# Patient Record
Sex: FEMALE | Race: WHITE | NOT HISPANIC OR LATINO | Employment: UNEMPLOYED | URBAN - METROPOLITAN AREA
[De-identification: names, ages, dates, MRNs, and addresses within clinical notes are randomized per-mention and may not be internally consistent; named-entity substitution may affect disease eponyms.]

---

## 2017-01-02 ENCOUNTER — MYC MEDICAL ADVICE (OUTPATIENT)
Dept: FAMILY MEDICINE | Facility: CLINIC | Age: 57
End: 2017-01-02

## 2017-01-02 DIAGNOSIS — M32.9 SYSTEMIC LUPUS ERYTHEMATOSUS (H): ICD-10-CM

## 2017-01-03 RX ORDER — ACETAMINOPHEN AND CODEINE PHOSPHATE 300; 30 MG/1; MG/1
1 TABLET ORAL EVERY 4 HOURS PRN
Qty: 45 TABLET | Refills: 0 | Status: SHIPPED | OUTPATIENT
Start: 2017-01-03 | End: 2017-06-06

## 2017-01-03 NOTE — TELEPHONE ENCOUNTER
Dr Glasgow, please see her mychart note, request for refill of controlled substance. Last Rx per Dr Forte:   acetaminophen-codeine (TYLENOL W/CODEINE NO. 3) 300-30 MG per tablet 45 tablet 3 10/3/2016       Fabienne Ewing RNC

## 2017-01-24 ENCOUNTER — MYC MEDICAL ADVICE (OUTPATIENT)
Dept: FAMILY MEDICINE | Facility: CLINIC | Age: 57
End: 2017-01-24

## 2017-01-24 DIAGNOSIS — F41.1 GENERALIZED ANXIETY DISORDER: Primary | ICD-10-CM

## 2017-01-25 NOTE — TELEPHONE ENCOUNTER
Dr. Glasgow,    Patient's asking for refill of 6 pills only on the clonazepam until her appt on 2/2/17.  Please advise. Ana BIRMINGHAM RN

## 2017-01-26 DIAGNOSIS — F41.8 DEPRESSION WITH ANXIETY: Primary | ICD-10-CM

## 2017-01-26 RX ORDER — CLONAZEPAM 0.5 MG/1
0.25-0.5 TABLET ORAL 2 TIMES DAILY PRN
Qty: 6 TABLET | Refills: 0 | Status: SHIPPED | OUTPATIENT
Start: 2017-01-26 | End: 2017-02-02

## 2017-01-26 NOTE — TELEPHONE ENCOUNTER
Ok I did print the prescription for 6 pills.  Please send to pharmacy and notify patient.  Thanks,  Luca Glasgwo MD    Diagnoses and all orders for this visit:    GENERALIZED ANXIETY DIS  -     clonazePAM (KLONOPIN) 0.5 MG tablet; Take 0.5-1 tablets (0.25-0.5 mg) by mouth 2 times daily as needed for anxiety

## 2017-01-30 ENCOUNTER — MYC REFILL (OUTPATIENT)
Dept: FAMILY MEDICINE | Facility: CLINIC | Age: 57
End: 2017-01-30

## 2017-01-30 DIAGNOSIS — F33.41 RECURRENT MAJOR DEPRESSIVE DISORDER, IN PARTIAL REMISSION (H): Primary | ICD-10-CM

## 2017-01-30 NOTE — TELEPHONE ENCOUNTER
Routing refill request to provider for review/approval because:  Suppose to follow up in 3 months. OV 10/13/16:    Follow up in 3 months    Thank you  Saumya TREVIÑO RN

## 2017-01-31 RX ORDER — SERTRALINE HYDROCHLORIDE 100 MG/1
200 TABLET, FILM COATED ORAL DAILY
Qty: 180 TABLET | Refills: 3 | Status: CANCELLED | OUTPATIENT
Start: 2017-01-31

## 2017-01-31 RX ORDER — SERTRALINE HYDROCHLORIDE 100 MG/1
200 TABLET, FILM COATED ORAL DAILY
Qty: 180 TABLET | Refills: 3 | Status: SHIPPED | OUTPATIENT
Start: 2017-01-31 | End: 2018-02-09

## 2017-01-31 NOTE — TELEPHONE ENCOUNTER
Message from Diversionmalen[employer]:  Original authorizing provider: SHUN Rasmussen CNP would like a refill of the following medications:  sertraline (ZOLOFT) 100 MG tablet [SHUN Rasmussen CNP]    Preferred pharmacy: [facility], [place] - [address]    Comment:

## 2017-01-31 NOTE — TELEPHONE ENCOUNTER
Sertraline/zoloft     Last Written Prescription Date: 11/16/15  Last Fill Quantity: 180, # refills: 3  Last Office Visit with G primary care provider:  10/13/16 Dr Glasgow   Next 5 appointments (look out 90 days)     Feb 02, 2017  3:20 PM   MyChart Long with Luca Glasgow MD   [facility] ([facility]    [address] [address]   [phone]                   Last PHQ-9 score on record=   PHQ-9 SCORE 10/14/2016   Total Score -   Total Score 0     Routing refill request to provider for review/approval because:  Drug interaction warning    Fabienne Ewing RNC

## 2017-02-02 ENCOUNTER — OFFICE VISIT (OUTPATIENT)
Dept: FAMILY MEDICINE | Facility: CLINIC | Age: 57
End: 2017-02-02
Payer: COMMERCIAL

## 2017-02-02 VITALS
HEART RATE: 88 BPM | HEIGHT: 66 IN | SYSTOLIC BLOOD PRESSURE: 132 MMHG | BODY MASS INDEX: 39.89 KG/M2 | DIASTOLIC BLOOD PRESSURE: 84 MMHG | TEMPERATURE: 98.8 F | WEIGHT: 248.2 LBS

## 2017-02-02 DIAGNOSIS — F33.41 RECURRENT MAJOR DEPRESSIVE DISORDER, IN PARTIAL REMISSION (H): ICD-10-CM

## 2017-02-02 DIAGNOSIS — I10 ESSENTIAL HYPERTENSION: ICD-10-CM

## 2017-02-02 DIAGNOSIS — M32.9 SYSTEMIC LUPUS ERYTHEMATOSUS (H): ICD-10-CM

## 2017-02-02 DIAGNOSIS — F41.1 GENERALIZED ANXIETY DISORDER: Primary | ICD-10-CM

## 2017-02-02 PROCEDURE — 99214 OFFICE O/P EST MOD 30 MIN: CPT | Performed by: FAMILY MEDICINE

## 2017-02-02 RX ORDER — CLONAZEPAM 0.5 MG/1
0.25-0.5 TABLET ORAL 2 TIMES DAILY PRN
Qty: 6 TABLET | Refills: 0 | Status: CANCELLED | OUTPATIENT
Start: 2017-02-02

## 2017-02-02 RX ORDER — CLONAZEPAM 0.5 MG/1
0.25 TABLET ORAL 3 TIMES DAILY PRN
Qty: 45 TABLET | Refills: 2 | Status: SHIPPED | OUTPATIENT
Start: 2017-02-02 | End: 2017-05-02

## 2017-02-02 ASSESSMENT — ANXIETY QUESTIONNAIRES
IF YOU CHECKED OFF ANY PROBLEMS ON THIS QUESTIONNAIRE, HOW DIFFICULT HAVE THESE PROBLEMS MADE IT FOR YOU TO DO YOUR WORK, TAKE CARE OF THINGS AT HOME, OR GET ALONG WITH OTHER PEOPLE: SOMEWHAT DIFFICULT
2. NOT BEING ABLE TO STOP OR CONTROL WORRYING: NOT AT ALL
1. FEELING NERVOUS, ANXIOUS, OR ON EDGE: NOT AT ALL
3. WORRYING TOO MUCH ABOUT DIFFERENT THINGS: NOT AT ALL
6. BECOMING EASILY ANNOYED OR IRRITABLE: NOT AT ALL
GAD7 TOTAL SCORE: 1
7. FEELING AFRAID AS IF SOMETHING AWFUL MIGHT HAPPEN: SEVERAL DAYS
5. BEING SO RESTLESS THAT IT IS HARD TO SIT STILL: NOT AT ALL

## 2017-02-02 ASSESSMENT — PATIENT HEALTH QUESTIONNAIRE - PHQ9: 5. POOR APPETITE OR OVEREATING: NOT AT ALL

## 2017-02-02 NOTE — PATIENT INSTRUCTIONS
Thank you for choosing [facility].  You may be receiving a survey in the mail from [employer] regarding your visit today.  Please take a few minutes to complete and return the survey to let us know how we are doing.      If you have questions or concerns, please contact us via [employer] or you can contact your care team at [phone].    Our Clinic hours are:  Monday 6:40 am  to 7:00 pm  Tuesday -Friday 6:40 am to 5:00 pm    The [place] outpatient lab hours are:  Monday - Friday 6:10 am to 4:45 pm  Saturdays 7:00 am to 11:00 am  Appointments are required, call [phone]    If you have clinical questions after hours or would like to schedule an appointment,  call the clinic at [phone].

## 2017-02-02 NOTE — PROGRESS NOTES
SUBJECTIVE:                                                    Cris Priest is a 56 year old female who presents to clinic today for the following health issues:      Chief Complaint   Patient presents with     Anxiety     refill of Clonazepam needed     Medication Problem     patient states when clonazepam was reodered recently it was re ordered wrong. Patient takes 1/2 tablet 3 times daily .      Medication Reconciliation     Due to current Lupus Flare Patient is taking Prednisone 20mg daily (tapering dose)     Health Maintenance     reminded due for Mammo and Dexa scan.        Anxiety Follow-Up    Status since last visit: No change    Other associated symptoms:None    Complicating factors:   Significant life event: No   Current substance abuse: None  Depression symptoms: No  EUGENIE-7 SCORE 11/16/2015 4/4/2016 10/14/2016   Total Score - - -   Total Score 0 1 3        GAD7         Amount of exercise or physical activity: 3 days/week for an average of  60 minutes    Problems taking medications regularly: No    Medication side effects: none    Diet: regular (no restrictions)  Charting and note completed later in the week as EPIC was down for the day of the visit.    SUBJECTIVE:                                                    Cris Priest is a 56 year old female who presents to clinic today for the following health issues:    Depression and Anxiety Follow-Up    Status since last visit: stable, more good days than bad days    Had been seeing a therapist and was stable so is on an as needed basis now.    Other associated symptoms:None    Complicating factors:     Significant life event: Yes-   cheated on her 2 years ago, still together     Current substance abuse: None    PHQ-9 SCORE 11/16/2015 4/4/2016 10/14/2016   Total Score - - -   Total Score 0 1 0     EUGENIE-7 SCORE 11/16/2015 4/4/2016 10/14/2016   Total Score - - -   Total Score 0 1 3        PHQ-9  English      PHQ-9   Any Language     GAD7   Prescribed  "Klonipin uses a 1/2 tablet (0.25mg) up to 3 times daily for anxiety #45 will last one month as the contract states.  Has seen Dr. [facility] for this and if things change patient very willing to see again.        Amount of exercise or physical activity: None    Problems taking medications regularly: No    Medication side effects: none    Diet: regular (no restrictions)    2. Lupus: followed by Rheumatology and has been stable.  On 1mg prednisone daily and at times will be on a burst for a flare.    3. Chronic pain: is taking tylenol #3s, had been on oxycodone in the past and at times might use this for a severe flare or new pain.  Followed by pain clinic.    4. HTN: controlled, no salt added diet    Problem list and histories reviewed & adjusted, as indicated.  Additional history: as documented    BP Readings from Last 3 Encounters:   02/02/17 135/96   12/07/16 143/94   10/16/16 138/86    Wt Readings from Last 3 Encounters:   02/02/17 248 lb 3.2 oz (112.583 kg)   12/07/16 242 lb (109.77 kg)   10/14/16 240 lb 9.6 oz (109.135 kg)         Labs reviewed in EPIC  Problem list, Medication list, Allergies, and Medical/Social/Surgical histories reviewed in [facility] and updated as appropriate.    ROS:  C: NEGATIVE for fever, chills, change in weight  R: NEGATIVE for significant cough or SOB  CV: NEGATIVE for chest pain, palpitations or peripheral edema    OBJECTIVE:                                                    /84 mmHg  Pulse 88  Temp(Src) 98.8  F (37.1  C) (Tympanic)  Ht 5' 6.25\" (1.683 m)  Wt 248 lb 3.2 oz (112.583 kg)  BMI 39.75 kg/m2  Breastfeeding? No  Body mass index is 39.75 kg/(m^2).  GENERAL: healthy, alert and no distress  CV: regular rate and rhythm, normal S1 S2, no S3 or S4, no murmur, click or rub, no peripheral edema and peripheral pulses strong  MS: no gross musculoskeletal defects noted, no edema  PSYCH: mentation appears normal, affect normal/bright, very chatty    Diagnostic Test " "Results:  none      ASSESSMENT/PLAN:                                                    Depression; recurrent episode-- Partial remission   Associated with the following complications:    Lupus   Plan:  No changes in the patient's current treatment plan        BMI:   Estimated body mass index is 39.75 kg/(m^2) as calculated from the following:    Height as of this encounter: 5' 6.25\" (1.683 m).    Weight as of this encounter: 248 lb 3.2 oz (112.583 kg).   Weight management plan: Discussed healthy diet and exercise guidelines and patient will follow up in 3 months in clinic to re-evaluate.    Cris was seen today for anxiety, medication problem, medication reconciliation and health maintenance.    Diagnoses and all orders for this visit:    GENERALIZED ANXIETY DIS: well controlled  -     clonazePAM (KLONOPIN) 0.5 MG tablet; Take 0.5 tablets (0.25 mg) by mouth 3 times daily as needed for anxiety .  45 tablets is a 30 day supply.    Recurrent major depressive disorder, in partial remission (H): stable    Systemic lupus erythematosus (H): follow with Rheumatology stable discussed DMARD/biologic but patient not willing to undergo those risks  -last labs 12/2016 stable  -on prednisone which is causing the weight gain    Essential hypertension: controlled          Follow up in 3 months    Luca Glasgow MD  [facility]        "

## 2017-02-02 NOTE — NURSING NOTE
"Chief Complaint   Patient presents with     Anxiety     refill of Clonazepam needed     Medication Problem     patient states when clonazepam was reodered recently it was re ordered wrong. Patient takes 1/2 tablet 3 times daily .      Medication Reconciliation     Due to current Lupus Flare Patient is taking Prednisone 20mg daily (tapering dose)       Initial /96 mmHg  Pulse 88  Temp(Src) 98.8  F (37.1  C) (Tympanic)  Ht 5' 6.25\" (1.683 m)  Wt 248 lb 3.2 oz (112.583 kg)  BMI 39.75 kg/m2  Breastfeeding? No Estimated body mass index is 39.75 kg/(m^2) as calculated from the following:    Height as of this encounter: 5' 6.25\" (1.683 m).    Weight as of this encounter: 248 lb 3.2 oz (112.583 kg).  BP completed using cuff size: large    "

## 2017-02-03 ASSESSMENT — PATIENT HEALTH QUESTIONNAIRE - PHQ9: SUM OF ALL RESPONSES TO PHQ QUESTIONS 1-9: 1

## 2017-02-03 ASSESSMENT — ANXIETY QUESTIONNAIRES: GAD7 TOTAL SCORE: 1

## 2017-03-16 ENCOUNTER — TELEPHONE (OUTPATIENT)
Dept: FAMILY MEDICINE | Facility: CLINIC | Age: 57
End: 2017-03-16

## 2017-03-16 NOTE — TELEPHONE ENCOUNTER
Your last mammogram was 12/4/12 at [place]. You are due for a screening Mammogram.  Please contact the Diagnostics Registration Department at: [phone] to schedule this appointment.     Specialty Comments have been updated:    Panel Management: Telephone Encounter: Breast Cancer 3/20/17    Kendrick Perez CMA

## 2017-03-16 NOTE — LETTER
[facility]  [address]  [place] [facility] [place] [facility] [place] [address]  Phone: [phone]    March 30, 2017    Cris Priest  [address]  [facility] [place] [address]              Dear Ms. Priest,    Your [facility] Care Team  has been reviewing your chart and it appears   you are currently due for a  mammogram.    You may call  Diagnostic Imaging at [phone] to schedule a mammogram.    We are trying to help our patients achieve their health care goals.  If you have had this procedure done elsewhere, please have a copy of your results sent to our clinic so we can update your file.    If you have any questions or concerns please contact the clinic.        Sincerely,      Your [facility] Care Team  / cb

## 2017-03-30 NOTE — TELEPHONE ENCOUNTER
Panel Management Review      Patient has the following on her problem list:     Depression / Dysthymia review  PHQ-9 SCORE 4/4/2016 10/14/2016 2/2/2017   Total Score - - -   Total Score 1 0 1      Patient is due for:  None      Composite cancer screening  Chart review shows that this patient is due/due soon for the following Mammogram  Summary:    Patient is due/failing the following:   MAMMOGRAM    Action needed:   Due for Mammogram     Type of outreach:    Sent letter.    Questions for provider review:    None                                                                                                                                    Ismael WALKER CMA

## 2017-05-02 ENCOUNTER — OFFICE VISIT (OUTPATIENT)
Dept: FAMILY MEDICINE | Facility: CLINIC | Age: 57
End: 2017-05-02
Payer: COMMERCIAL

## 2017-05-02 VITALS
OXYGEN SATURATION: 95 % | BODY MASS INDEX: 41.08 KG/M2 | WEIGHT: 255.6 LBS | HEIGHT: 66 IN | SYSTOLIC BLOOD PRESSURE: 134 MMHG | DIASTOLIC BLOOD PRESSURE: 85 MMHG | TEMPERATURE: 99.4 F

## 2017-05-02 DIAGNOSIS — F41.1 GENERALIZED ANXIETY DISORDER: ICD-10-CM

## 2017-05-02 DIAGNOSIS — M32.9 SYSTEMIC LUPUS ERYTHEMATOSUS (H): ICD-10-CM

## 2017-05-02 DIAGNOSIS — F33.41 RECURRENT MAJOR DEPRESSIVE DISORDER, IN PARTIAL REMISSION (H): Primary | ICD-10-CM

## 2017-05-02 DIAGNOSIS — E66.01 MORBID OBESITY DUE TO EXCESS CALORIES (H): ICD-10-CM

## 2017-05-02 PROCEDURE — 99213 OFFICE O/P EST LOW 20 MIN: CPT | Performed by: FAMILY MEDICINE

## 2017-05-02 RX ORDER — CLONAZEPAM 0.5 MG/1
0.25 TABLET ORAL 3 TIMES DAILY PRN
Qty: 45 TABLET | Refills: 2 | Status: SHIPPED | OUTPATIENT
Start: 2017-05-02 | End: 2017-07-25

## 2017-05-02 NOTE — NURSING NOTE
"Chief Complaint   Patient presents with     Refill Request     clonazepam       Initial /85  Temp 99.4  F (37.4  C) (Tympanic)  Ht 5' 6.25\" (1.683 m)  Wt 255 lb 9.6 oz (115.9 kg)  SpO2 95%  BMI 40.94 kg/m2 Estimated body mass index is 40.94 kg/(m^2) as calculated from the following:    Height as of this encounter: 5' 6.25\" (1.683 m).    Weight as of this encounter: 255 lb 9.6 oz (115.9 kg).  Medication Reconciliation: complete  "

## 2017-05-02 NOTE — LETTER
My Depression Action Plan  Name: Cris Priest   Date of Birth 1960  Date: 5/2/2017    My doctor: No primary care provider on file.   My clinic: [facility]  [address] [address]  [phone]          GREEN    ZONE   Good Control    What it looks like:     Things are going generally well. You have normal up s and down s. You may even feel depressed from time to time, but bad moods usually last less than a day.   What you need to do:  1. Continue to care for yourself (see self care plan)  2. Check your depression survival kit and update it as needed  3. Follow your physician s recommendations including any medication.  4. Do not stop taking medication unless you consult with your physician first.           YELLOW         ZONE Getting Worse    What it looks like:     Depression is starting to interfere with your life.     It may be hard to get out of bed; you may be starting to isolate yourself from others.    Symptoms of depression are starting to last most all day and this has happened for several days.     You may have suicidal thoughts but they are not constant.   What you need to do:     1. Call your care team, your response to treatment will improve if you keep your care team informed of your progress. Yellow periods are signs an adjustment may need to be made.     2. Continue your self-care, even if you have to fake it!    3. Talk to someone in your support network    4. Open up your depression survival kit           RED    ZONE Medical Alert - Get Help    What it looks like:     Depression is seriously interfering with your life.     You may experience these or other symptoms: You can t get out of bed most days, can t work or engage in other necessary activities, you have trouble taking care of basic hygiene, or basic responsibilities, thoughts of suicide or death that will not go away, self-injurious behavior.     What you need to do:  1. Call your  care team and request a same-day appointment. If they are not available (weekends or after hours) call your local crisis line, emergency room or 911.      Electronically signed by: Dr. Luca Glasgow, May 2, 2017    Depression Self Care Plan / Survival Kit    Self-Care for Depression  Here s the deal. Your body and mind are really not as separate as most people think.  What you do and think affects how you feel and how you feel influences what you do and think. This means if you do things that people who feel good do, it will help you feel better.  Sometimes this is all it takes.  There is also a place for medication and therapy depending on how severe your depression is, so be sure to consult with your medical provider and/ or Behavioral Health Consultant if your symptoms are worsening or not improving.     In order to better manage my stress, I will:    Exercise  Get some form of exercise, every day. This will help reduce pain and release endorphins, the  feel good  chemicals in your brain. This is almost as good as taking antidepressants!  This is not the same as joining a gym and then never going! (they count on that by the way ) It can be as simple as just going for a walk or doing some gardening, anything that will get you moving.      Hygiene   Maintain good hygiene (Get out of bed in the morning, Make your bed, Brush your teeth, Take a shower, and Get dressed like you were going to work, even if you are unemployed).  If your clothes don't fit try to get ones that do.    Diet  I will strive to eat foods that are good for me, drink plenty of water, and avoid excessive sugar, caffeine, alcohol, and other mood-altering substances.  Some foods that are helpful in depression are: complex carbohydrates, B vitamins, flaxseed, fish or fish oil, fresh fruits and vegetables.    Psychotherapy  I agree to participate in Individual Therapy (if recommended).    Medication  If prescribed medications, I agree to take them.   Missing doses can result in serious side effects.  I understand that drinking alcohol, or other illicit drug use, may cause potential side effects.  I will not stop my medication abruptly without first discussing it with my provider.    Staying Connected With Others  I will stay in touch with my friends, family members, and my primary care provider/team.    Use your imagination  Be creative.  We all have a creative side; it doesn t matter if it s oil painting, sand castles, or mud pies! This will also kick up the endorphins.    Witness Beauty  (AKA stop and smell the roses) Take a look outside, even in mid-winter. Notice colors, textures. Watch the squirrels and birds.     Service to others  Be of service to others.  There is always someone else in need.  By helping others we can  get out of ourselves  and remember the really important things.  This also provides opportunities for practicing all the other parts of the program.    Humor  Laugh and be silly!  Adjust your TV habits for less news and crime-drama and more comedy.    Control your stress  Try breathing deep, massage therapy, biofeedback, and meditation. Find time to relax each day.     My support system    Clinic Contact:  Phone number:    Contact 1:  Phone number:    Contact 2:  Phone number:    Oriental orthodox/:  Phone number:    Therapist:  Phone number:    Local crisis center:    Phone number:    Other community support:  Phone number:

## 2017-05-02 NOTE — MR AVS SNAPSHOT
After Visit Summary   5/2/2017    Cris Priest    MRN: [id number]           Patient Information     Date Of Birth          1960        Visit Information        Provider Department      5/2/2017 3:00 PM Luca Glasgow MD [facility]        Today's Diagnoses     Recurrent major depressive disorder, in partial remission (H)    -  1    GENERALIZED ANXIETY DIS        Systemic lupus erythematosus (H)        Morbid obesity due to excess calories (H)          Care Instructions      Next clinic visit in 3 months  Eating small protein, veggie, and fruit for meals  Exercise, walking every morning and increase as able weekly    Thank you for choosing [facility].  You may be receiving a survey in the mail from [employer] regarding your visit today.  Please take a few minutes to complete and return the survey to let us know how we are doing.      If you have questions or concerns, please contact us via [employer] or you can contact your care team at [phone].    Our Clinic hours are:  Monday 6:40 am  to 7:00 pm  Tuesday -Friday 6:40 am to 5:00 pm    The [place] outpatient lab hours are:  Monday - Friday 6:10 am to 4:45 pm  Saturdays 7:00 am to 11:00 am  Appointments are required, call [phone]    If you have clinical questions after hours or would like to schedule an appointment,  call the clinic at [phone].        Follow-ups after your visit        Who to contact     If you have questions or need follow up information about today's clinic visit or your schedule please contact [facility] directly at [phone].  Normal or non-critical lab and imaging results will be communicated to you by MyChart, letter or phone within 4 business days after the clinic has received the results. If you do not hear from us within 7 days, please contact the clinic through [employer] or phone. If you have a critical or abnormal lab result, we will notify you by phone as soon as  "possible.  Submit refill requests through [employer] or call your pharmacy and they will forward the refill request to us. Please allow 3 business days for your refill to be completed.          Additional Information About Your Visit        [employer] Information     [employer] gives you secure access to your electronic health record. If you see a primary care provider, you can also send messages to your care team and make appointments. If you have questions, please call your primary care clinic.  If you do not have a primary care provider, please call [phone] and they will assist you.        Care EveryWhere ID     This is your Care EveryWhere ID. This could be used by other organizations to access your [place] medical records  [id number]        Your Vitals Were     Temperature Height Pulse Oximetry BMI (Body Mass Index)          99.4  F (37.4  C) (Tympanic) 5' 6.25\" (1.683 m) 95% 40.94 kg/m2         Blood Pressure from Last 3 Encounters:   05/02/17 134/85   02/02/17 132/84   12/07/16 (!) 143/94    Weight from Last 3 Encounters:   05/02/17 255 lb 9.6 oz (115.9 kg)   02/02/17 248 lb 3.2 oz (112.6 kg)   12/07/16 242 lb (109.8 kg)              We Performed the Following     DEPRESSION ACTION PLAN (DAP)          Where to get your medicines      Some of these will need a paper prescription and others can be bought over the counter.  Ask your nurse if you have questions.     Bring a paper prescription for each of these medications     clonazePAM 0.5 MG tablet          Primary Care Provider    None Specified       No primary provider on file.        Thank you!     Thank you for choosing [facility]  for your care. Our goal is always to provide you with excellent care. Hearing back from our patients is one way we can continue to improve our services. Please take a few minutes to complete the written survey that you may receive in the mail after your visit with us. Thank you!             Your Updated Medication " List - Protect others around you: Learn how to safely use, store and throw away your medicines at www.disposemymeds.org.          This list is accurate as of: 5/2/17  3:52 PM.  Always use your most recent med list.                   Brand Name Dispense Instructions for use    acetaminophen-codeine 300-30 MG per tablet    TYLENOL w/CODEINE No. 3    45 tablet    Take 1 tablet by mouth every 4 hours as needed for mild pain Max of 4/day.  Avoid taking before bed time       albuterol 108 (90 BASE) MCG/ACT Inhaler    PROAIR HFA/PROVENTIL HFA/VENTOLIN HFA    1 Inhaler    Inhale 2 puffs into the lungs every 6 hours as needed for shortness of breath / dyspnea or wheezing       amitriptyline 75 MG tablet    ELAVIL    90 tablet    Take 1 tablet (75 mg) by mouth At Bedtime       amLODIPine 10 MG tablet    NORVASC    90 tablet    Take 1 tablet (10 mg) by mouth daily       azaTHIOprine 50 MG tablet    IMURAN    270 tablet    Take 1 tablet (50 mg) by mouth 3 times daily       calcium carbonate 500 MG tablet    OS-DEJUAN 500 mg [place]. Ca     1 TABLET ORALLY 3 TIMES DAILY       clonazePAM 0.5 MG tablet    klonoPIN    45 tablet    Take 0.5 tablets (0.25 mg) by mouth 3 times daily as needed for anxiety .  45 tablets is a 30 day supply.       conjugated estrogens cream    PREMARIN    12 g    Place 0.5 g vaginally twice a week       cyclobenzaprine 10 MG tablet    FLEXERIL    60 tablet    Take 1 tablet (10 mg) by mouth 2 times daily as needed for muscle spasms Start at night only, and increase as tolerated to daytime use, up to three times a day.  Take regularly at least 1-2 weeks, for any pain flair.       hydroxychloroquine 200 MG tablet    PLAQUENIL    270 tablet    Take 3 tablets (600 mg) by mouth daily       hydrOXYzine 25 MG tablet    ATARAX    60 tablet    Take 1-2 tablets (25-50 mg) by mouth every 6 hours as needed for itching or anxiety       predniSONE 1 MG tablet    DELTASONE     Take 1 tablet (1 mg) by mouth daily        sertraline 100 MG tablet    ZOLOFT    180 tablet    Take 2 tablets (200 mg) by mouth daily       VITAMIN D PO      Take 5,000 Units by mouth every evening

## 2017-05-02 NOTE — PATIENT INSTRUCTIONS
Next clinic visit in 3 months  Eating small protein, veggie, and fruit for meals  Exercise, walking every morning and increase as able weekly    Thank you for choosing [facility].  You may be receiving a survey in the mail from [employer]ak[employer] regarding your visit today.  Please take a few minutes to complete and return the survey to let us know how we are doing.      If you have questions or concerns, please contact us via [employer] or you can contact your care team at [phone].    Our Clinic hours are:  Monday 6:40 am  to 7:00 pm  Tuesday -Friday 6:40 am to 5:00 pm    The [place] outpatient lab hours are:  Monday - Friday 6:10 am to 4:45 pm  Saturdays 7:00 am to 11:00 am  Appointments are required, call [phone]    If you have clinical questions after hours or would like to schedule an appointment,  call the clinic at [phone].

## 2017-05-02 NOTE — PROGRESS NOTES
SUBJECTIVE:                                                    Cris Priest is a 57 year old female who presents to clinic today for the following health issues:      Medication Followup of clonazepam    Taking Medication as prescribed: yes    Side Effects:  None    Medication Helping Symptoms:  yes   Prescribed Klonipin uses a 1/2 tablet (0.25mg) up to 3 times daily for anxiety #45 will last one month as the contract states.  Has seen Dr. Moore Psychiatry for this and if things change patient very willing to see again.        Amount of exercise or physical activity: None    Problems taking medications regularly: No    Medication side effects: none    Diet: regular (no restrictions)    2. Lupus: followed by Rheumatology and has been stable.  On 1mg prednisone daily and at times will be on a burst for a flare.    3. Chronic pain: is taking tylenol #3s, had been on oxycodone in the past and at times might use this for a severe flare or new pain.  Followed by pain clinic.    4. HTN: controlled, no salt added diet      Depression and Anxiety Follow-Up    Status since last visit: No change    Other associated symptoms:None    Complicating factors:     Significant life event: Yes-  cheated 2 years ago still together.     Current substance abuse: None    Caffeine: 1.5 cups    Alcohol: none      PHQ-9 SCORE 4/4/2016 10/14/2016 2/2/2017   Total Score - - -   Total Score 1 0 1     EUGENIE-7 SCORE 4/4/2016 10/14/2016 2/2/2017   Total Score - - -   Total Score 1 3 1        PHQ-9  English      PHQ-9   Any Language     GAD7       Problem list and histories reviewed & adjusted, as indicated.  Additional history: as documented    Reviewed and updated as needed this visit by clinical staff  Allergies       Reviewed and updated as needed this visit by Provider         ROS:  Constitutional, HEENT, cardiovascular, pulmonary, gi and gu systems are negative, except as otherwise noted.    OBJECTIVE:                                  "                   /85  Temp 99.4  F (37.4  C) (Tympanic)  Ht 5' 6.25\" (1.683 m)  Wt 255 lb 9.6 oz (115.9 kg)  SpO2 95%  BMI 40.94 kg/m2  Body mass index is 40.94 kg/(m^2).  GENERAL: healthy, alert and no distress  PSYCH: mentation appears normal, affect normal/bright and tearful    Diagnostic Test Results:  none      ASSESSMENT/PLAN:                                                        Cris was seen today for refill request and health maintenance.    Diagnoses and all orders for this visit:    Recurrent major depressive disorder, in partial remission (H): stable    GENERALIZED ANXIETY DIS: stable, refilled today  -     clonazePAM (KLONOPIN) 0.5 MG tablet; Take 0.5 tablets (0.25 mg) by mouth 3 times daily as needed for anxiety .  45 tablets is a 30 day supply.  -     DEPRESSION ACTION PLAN (DAP)    Systemic lupus erythematosus (H): followed by Rheum    Morbid obesity due to excess calories (H): discussed healthy eating and exercise today        Patient Instructions     Next clinic visit in 3 months  Eating small protein, veggie, and fruit for meals  Exercise, walking every morning and increase as able weekly    Thank you for choosing [facility].  You may be receiving a survey in the mail from [employer] regarding your visit today.  Please take a few minutes to complete and return the survey to let us know how we are doing.      If you have questions or concerns, please contact us via [employer] or you can contact your care team at [phone].    Our Clinic hours are:  Monday 6:40 am  to 7:00 pm  Tuesday -Friday 6:40 am to 5:00 pm    The [place] outpatient lab hours are:  Monday - Friday 6:10 am to 4:45 pm  Saturdays 7:00 am to 11:00 am  Appointments are required, call [phone]    If you have clinical questions after hours or would like to schedule an appointment,  call the clinic at [phone].      Luca Glasgow MD  [facility]  "

## 2017-06-05 ENCOUNTER — MYC MEDICAL ADVICE (OUTPATIENT)
Dept: FAMILY MEDICINE | Facility: CLINIC | Age: 57
End: 2017-06-05

## 2017-06-05 DIAGNOSIS — M32.9 SYSTEMIC LUPUS ERYTHEMATOSUS (H): ICD-10-CM

## 2017-06-06 RX ORDER — ACETAMINOPHEN AND CODEINE PHOSPHATE 300; 30 MG/1; MG/1
1 TABLET ORAL EVERY 4 HOURS PRN
Qty: 45 TABLET | Refills: 0 | Status: SHIPPED | OUTPATIENT
Start: 2017-06-06 | End: 2017-07-05

## 2017-06-06 NOTE — TELEPHONE ENCOUNTER
Rx is on my desk.  Message left for patient to return call to clinic.  Need to know where to fax Rx.

## 2017-06-06 NOTE — TELEPHONE ENCOUNTER
Pt returned call and would like rx walked to the pharmacy here.    Done.    [facility]

## 2017-06-06 NOTE — TELEPHONE ENCOUNTER
I did print medication.  Please notify patient when ready or if she would like it faxed.    Thanks,  Luca Glasgow MD

## 2017-07-05 ENCOUNTER — OFFICE VISIT (OUTPATIENT)
Dept: RHEUMATOLOGY | Facility: CLINIC | Age: 57
End: 2017-07-05
Payer: COMMERCIAL

## 2017-07-05 VITALS
HEART RATE: 96 BPM | TEMPERATURE: 98.2 F | DIASTOLIC BLOOD PRESSURE: 97 MMHG | WEIGHT: 253 LBS | BODY MASS INDEX: 40.53 KG/M2 | SYSTOLIC BLOOD PRESSURE: 142 MMHG | RESPIRATION RATE: 18 BRPM

## 2017-07-05 DIAGNOSIS — M32.8 OTHER FORMS OF SYSTEMIC LUPUS ERYTHEMATOSUS (H): Primary | ICD-10-CM

## 2017-07-05 DIAGNOSIS — M70.60 TROCHANTERIC BURSITIS, UNSPECIFIED LATERALITY: ICD-10-CM

## 2017-07-05 DIAGNOSIS — E78.5 HYPERLIPIDEMIA LDL GOAL <130: ICD-10-CM

## 2017-07-05 LAB
ALBUMIN SERPL-MCNC: 3.2 G/DL (ref 3.4–5)
ALP SERPL-CCNC: 71 U/L (ref 40–150)
ALT SERPL W P-5'-P-CCNC: 26 U/L (ref 0–50)
ANION GAP SERPL CALCULATED.3IONS-SCNC: 5 MMOL/L (ref 3–14)
AST SERPL W P-5'-P-CCNC: 44 U/L (ref 0–45)
BASOPHILS # BLD AUTO: 0 10E9/L (ref 0–0.2)
BASOPHILS NFR BLD AUTO: 0.5 %
BILIRUB SERPL-MCNC: 0.5 MG/DL (ref 0.2–1.3)
BUN SERPL-MCNC: 10 MG/DL (ref 7–30)
CALCIUM SERPL-MCNC: 9.1 MG/DL (ref 8.5–10.1)
CHLORIDE SERPL-SCNC: 102 MMOL/L (ref 94–109)
CHOLEST SERPL-MCNC: 201 MG/DL
CO2 SERPL-SCNC: 28 MMOL/L (ref 20–32)
CREAT SERPL-MCNC: 0.68 MG/DL (ref 0.52–1.04)
DIFFERENTIAL METHOD BLD: ABNORMAL
EOSINOPHIL # BLD AUTO: 0.1 10E9/L (ref 0–0.7)
EOSINOPHIL NFR BLD AUTO: 1.3 %
ERYTHROCYTE [DISTWIDTH] IN BLOOD BY AUTOMATED COUNT: 15.7 % (ref 10–15)
GFR SERPL CREATININE-BSD FRML MDRD: 89 ML/MIN/1.7M2
GLUCOSE SERPL-MCNC: 100 MG/DL (ref 70–99)
HCT VFR BLD AUTO: 45.2 % (ref 35–47)
HDLC SERPL-MCNC: 40 MG/DL
HGB BLD-MCNC: 13.9 G/DL (ref 11.7–15.7)
LDLC SERPL CALC-MCNC: 126 MG/DL
LYMPHOCYTES # BLD AUTO: 1.6 10E9/L (ref 0.8–5.3)
LYMPHOCYTES NFR BLD AUTO: 20.1 %
MCH RBC QN AUTO: 27.7 PG (ref 26.5–33)
MCHC RBC AUTO-ENTMCNC: 30.8 G/DL (ref 31.5–36.5)
MCV RBC AUTO: 90 FL (ref 78–100)
MONOCYTES # BLD AUTO: 0.4 10E9/L (ref 0–1.3)
MONOCYTES NFR BLD AUTO: 5.5 %
NEUTROPHILS # BLD AUTO: 5.8 10E9/L (ref 1.6–8.3)
NEUTROPHILS NFR BLD AUTO: 72.6 %
NONHDLC SERPL-MCNC: 161 MG/DL
PLATELET # BLD AUTO: 231 10E9/L (ref 150–450)
POTASSIUM SERPL-SCNC: 4.7 MMOL/L (ref 3.4–5.3)
PROT SERPL-MCNC: 9.6 G/DL (ref 6.8–8.8)
RBC # BLD AUTO: 5.01 10E12/L (ref 3.8–5.2)
SODIUM SERPL-SCNC: 135 MMOL/L (ref 133–144)
TRIGL SERPL-MCNC: 177 MG/DL
WBC # BLD AUTO: 8 10E9/L (ref 4–11)

## 2017-07-05 PROCEDURE — 80061 LIPID PANEL: CPT | Performed by: INTERNAL MEDICINE

## 2017-07-05 PROCEDURE — 36415 COLL VENOUS BLD VENIPUNCTURE: CPT | Performed by: INTERNAL MEDICINE

## 2017-07-05 PROCEDURE — 20610 DRAIN/INJ JOINT/BURSA W/O US: CPT | Mod: 50 | Performed by: INTERNAL MEDICINE

## 2017-07-05 PROCEDURE — 80053 COMPREHEN METABOLIC PANEL: CPT | Performed by: INTERNAL MEDICINE

## 2017-07-05 PROCEDURE — 85025 COMPLETE CBC W/AUTO DIFF WBC: CPT | Performed by: INTERNAL MEDICINE

## 2017-07-05 PROCEDURE — 99214 OFFICE O/P EST MOD 30 MIN: CPT | Mod: 25 | Performed by: INTERNAL MEDICINE

## 2017-07-05 RX ORDER — ACETAMINOPHEN AND CODEINE PHOSPHATE 300; 30 MG/1; MG/1
1 TABLET ORAL EVERY 4 HOURS PRN
Qty: 15 TABLET | Refills: 0 | Status: SHIPPED | OUTPATIENT
Start: 2017-07-05 | End: 2017-11-09

## 2017-07-05 RX ORDER — TRIAMCINOLONE ACETONIDE 40 MG/ML
40 INJECTION, SUSPENSION INTRA-ARTICULAR; INTRAMUSCULAR ONCE
Qty: 2 ML | Refills: 0 | OUTPATIENT
Start: 2017-07-05 | End: 2017-07-05

## 2017-07-05 RX ORDER — LIDOCAINE HYDROCHLORIDE 10 MG/ML
2 INJECTION, SOLUTION INFILTRATION; PERINEURAL ONCE
Qty: 4 ML | Refills: 0 | OUTPATIENT
Start: 2017-07-05 | End: 2017-07-05

## 2017-07-05 NOTE — NURSING NOTE
"Chief Complaint   Patient presents with     Recheck Medication     Lupus       Initial BP (!) 142/97 (BP Location: Right arm, Patient Position: Chair)  Pulse 96  Temp 98.2  F (36.8  C) (Oral)  Resp 18  Wt 253 lb (114.8 kg)  BMI 40.53 kg/m2 Estimated body mass index is 40.53 kg/(m^2) as calculated from the following:    Height as of 5/2/17: 5' 6.25\" (1.683 m).    Weight as of this encounter: 253 lb (114.8 kg).  Medication Reconciliation: complete   Harika Warner LPN    "

## 2017-07-05 NOTE — PROGRESS NOTES
Cris is seen back in follow-up for her systemic lupus. She has run short of her Tylenol with Codeine which is being filled by a pain clinic doctor if she asks if she could have a few pills to make it until his return on Monday. She tells me this will not violate her drug contract as long as she speaks with him.  She thinks her lupus is doing recently well. She is stiff in the morning for approximately 30 minutes. She has achiness in her hands and feet but especially in the bilateral trochanteric bursas.    She is not having any stomatitis. There is no chest pain or progressive dyspnea or shortness of breath.  She remains on Plaquenil, low-dose prednisone, and Imuran without any obvious side effects of recurrent infections, fevers, chills, sweats, nausea, vomiting, diarrhea, or abdominal pain. She thinks his regimen is working reasonably well and does not think any changes need to be made today.    Physical exam: Blood pressure 142/97, pulse 96, respiratory rate 18, weight 253. Neck is supple without lymphadenopathy. Lungs are clear to alteration bilaterally. Heart regular rate and rhythm without murmur. Joint exam there is no synovitis or deformities of the wrist, MCPs, PIPs bilaterally. There is no synovitis of the elbows, shoulders, knees, ankles. There is tenderness over both trochanteric regions. Skin there is no lupus rash seen today in no malar rash no sclerodactyly.    Impression: Systemic lupus doing recently well  Bilateral trochanteric bursitis    Recommendations:  I gave her #15 of Tylenol with codeine.  I will inject both trochanteric regions today  Continue azathioprine 150 mg daily  Continue Plaquenil 400 mg daily  Continue prednisone when needed  Follow-up in 3 months    Procedure note: After informed verbal consent was obtained, under sterile technique, after the use of ethyl chloride for anesthetic, injected 40 mg of Kenalog with 2 cc of lidocaine into both trochanteric regions without  complications. Patient tolerated these procedures well.

## 2017-07-25 ENCOUNTER — MYC MEDICAL ADVICE (OUTPATIENT)
Dept: FAMILY MEDICINE | Facility: CLINIC | Age: 57
End: 2017-07-25

## 2017-07-25 DIAGNOSIS — F41.1 GENERALIZED ANXIETY DISORDER: ICD-10-CM

## 2017-07-25 RX ORDER — CLONAZEPAM 0.5 MG/1
0.25 TABLET ORAL 3 TIMES DAILY PRN
Qty: 45 TABLET | Refills: 2 | Status: SHIPPED | OUTPATIENT
Start: 2017-07-25 | End: 2017-11-09
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
## 2017-07-25 NOTE — TELEPHONE ENCOUNTER
Please notify patient.  Refill printed with 2 refills to last for 3 months.  Patient can either keep the appt next week if has other things to talk about or can just see me again in 3 months for next refill.    Luca Glasgwo MD

## 2017-07-31 ENCOUNTER — MYC MEDICAL ADVICE (OUTPATIENT)
Dept: RHEUMATOLOGY | Facility: CLINIC | Age: 57
End: 2017-07-31

## 2017-10-23 ENCOUNTER — MYC MEDICAL ADVICE (OUTPATIENT)
Dept: FAMILY MEDICINE | Facility: CLINIC | Age: 57
End: 2017-10-23

## 2017-10-23 DIAGNOSIS — F41.1 GENERALIZED ANXIETY DISORDER: ICD-10-CM

## 2017-10-23 NOTE — TELEPHONE ENCOUNTER
See hernandezt.    clonazepam      Last Written Prescription Date: 7-25-17  Last Fill Quantity: 45,  # refills: 3   Last Office Visit with [facility] or [facility] prescribing provider: 5-2-17                                      Next 5 appointments (look out 90 days)     Oct 26, 2017  3:20 PM CDT   Dequan Venegas with Luca Glasgow MD   [facility]    [address] [address]   [phone]            Nov 09, 2017  3:20 PM CST   MyChart [facility] with Luca Glasgow MD   [facility]    [address] [address]   [phone]                  Michaela WALKER RN

## 2017-10-24 RX ORDER — CLONAZEPAM 0.5 MG/1
0.25 TABLET ORAL 3 TIMES DAILY PRN
Qty: 15 TABLET | Refills: 0 | Status: SHIPPED | OUTPATIENT
Start: 2017-10-24 | End: 2017-10-24

## 2017-10-24 RX ORDER — CLONAZEPAM 0.5 MG/1
0.25 TABLET ORAL 3 TIMES DAILY PRN
Qty: 22 TABLET | Refills: 0 | Status: SHIPPED | OUTPATIENT
Start: 2017-10-24 | End: 2017-11-09 | Stop reason: ALTCHOICE

## 2017-10-24 NOTE — TELEPHONE ENCOUNTER
Called pt, she said to say thank you and she would like that walked to our pharmacy. Prescription walked to our pharmacy.    [place]

## 2017-11-09 ENCOUNTER — OFFICE VISIT (OUTPATIENT)
Dept: FAMILY MEDICINE | Facility: CLINIC | Age: 57
End: 2017-11-09
Payer: COMMERCIAL

## 2017-11-09 VITALS
DIASTOLIC BLOOD PRESSURE: 88 MMHG | SYSTOLIC BLOOD PRESSURE: 134 MMHG | HEIGHT: 66 IN | HEART RATE: 80 BPM | WEIGHT: 252.8 LBS | OXYGEN SATURATION: 96 % | BODY MASS INDEX: 40.63 KG/M2 | TEMPERATURE: 99 F

## 2017-11-09 DIAGNOSIS — E66.01 MORBID OBESITY DUE TO EXCESS CALORIES (H): ICD-10-CM

## 2017-11-09 DIAGNOSIS — F41.1 GENERALIZED ANXIETY DISORDER: Primary | ICD-10-CM

## 2017-11-09 DIAGNOSIS — J20.9 ACUTE BRONCHITIS WITH SYMPTOMS > 10 DAYS: ICD-10-CM

## 2017-11-09 DIAGNOSIS — Z23 NEED FOR PROPHYLACTIC VACCINATION AND INOCULATION AGAINST INFLUENZA: ICD-10-CM

## 2017-11-09 DIAGNOSIS — F41.1 GAD (GENERALIZED ANXIETY DISORDER): ICD-10-CM

## 2017-11-09 PROCEDURE — 99213 OFFICE O/P EST LOW 20 MIN: CPT | Mod: 25 | Performed by: FAMILY MEDICINE

## 2017-11-09 PROCEDURE — 90686 IIV4 VACC NO PRSV 0.5 ML IM: CPT | Performed by: FAMILY MEDICINE

## 2017-11-09 PROCEDURE — 90471 IMMUNIZATION ADMIN: CPT | Performed by: FAMILY MEDICINE

## 2017-11-09 RX ORDER — CLONAZEPAM 0.5 MG/1
0.25 TABLET ORAL 3 TIMES DAILY PRN
Qty: 45 TABLET | Refills: 2 | Status: SHIPPED | OUTPATIENT
Start: 2017-11-09 | End: 2018-02-09

## 2017-11-09 RX ORDER — ALBUTEROL SULFATE 90 UG/1
2 AEROSOL, METERED RESPIRATORY (INHALATION) EVERY 6 HOURS PRN
Qty: 1 INHALER | Refills: 11 | Status: SHIPPED | OUTPATIENT
Start: 2017-11-09 | End: 2019-05-30

## 2017-11-09 RX ORDER — AMITRIPTYLINE HYDROCHLORIDE 75 MG/1
75 TABLET ORAL AT BEDTIME
Qty: 15 TABLET | Refills: 0 | Status: SHIPPED | OUTPATIENT
Start: 2017-11-09 | End: 2018-02-09

## 2017-11-09 RX ORDER — AMITRIPTYLINE HYDROCHLORIDE 75 MG/1
75 TABLET ORAL AT BEDTIME
Qty: 90 TABLET | Refills: 3 | Status: SHIPPED | OUTPATIENT
Start: 2017-11-09 | End: 2017-11-09

## 2017-11-09 NOTE — NURSING NOTE
"Chief Complaint   Patient presents with     Refill Request     clonazepam and amitriptyline       Initial BP (!) 155/95  Pulse 80  Temp 99  F (37.2  C) (Tympanic)  Ht 5' 6.25\" (1.683 m)  Wt 252 lb 12.8 oz (114.7 kg)  SpO2 96%  BMI 40.5 kg/m2 Estimated body mass index is 40.5 kg/(m^2) as calculated from the following:    Height as of this encounter: 5' 6.25\" (1.683 m).    Weight as of this encounter: 252 lb 12.8 oz (114.7 kg).  Medication Reconciliation: complete  "

## 2017-11-09 NOTE — PROGRESS NOTES
"  SUBJECTIVE:   rCis Priest is a 57 year old female who presents to clinic today for the following health issues:      Medication Followup of albuterol    Taking Medication as prescribed: yes    Side Effects:  None    Medication Helping Symptoms:  yes         Medication Followup of clonazepam    Taking Medication as prescribed: yes    Side Effects:  None    Medication Helping Symptoms:  yes   Prescribed Klonipin uses a 1/2 tablet (0.25mg) up to 3 times daily for anxiety #45 will last one month as the contract states.  Has seen Dr. Moore Psychiatry for this and if things change patient very willing to see again.      Medication Followup of amitripytline    Taking Medication as prescribed: yes    Side Effects:  None    Medication Helping Symptoms:  yes        Problem list and histories reviewed & adjusted, as indicated.  Additional history: as documented    BP Readings from Last 3 Encounters:   11/09/17 (!) 155/95   07/05/17 (!) 142/97   05/02/17 134/85    Wt Readings from Last 3 Encounters:   11/09/17 252 lb 12.8 oz (114.7 kg)   07/05/17 253 lb (114.8 kg)   05/02/17 255 lb 9.6 oz (115.9 kg)                      Reviewed and updated as needed this visit by clinical staffTobacco  Allergies  Med Hx  Surg Hx  Fam Hx  Soc Hx      Reviewed and updated as needed this visit by Provider         ROS:  C: NEGATIVE for fever, chills, change in weight  R: NEGATIVE for significant cough or SOB  CV: NEGATIVE for chest pain, palpitations or peripheral edema    OBJECTIVE:     /88 (BP Location: Right arm, Patient Position: Sitting, Cuff Size: Adult Large)  Pulse 80  Temp 99  F (37.2  C) (Tympanic)  Ht 5' 6.25\" (1.683 m)  Wt 252 lb 12.8 oz (114.7 kg)  SpO2 96%  BMI 40.5 kg/m2  Body mass index is 40.5 kg/(m^2).  GENERAL: healthy, alert and no distress  RESP: lungs clear to auscultation - no rales, rhonchi or wheezes  CV: regular rate and rhythm, normal S1 S2, no S3 or S4, no murmur, click or rub, no peripheral edema " and peripheral pulses strong  MS: no gross musculoskeletal defects noted, no edema    Diagnostic Test Results:  none     ASSESSMENT/PLAN:       Cris was seen today for refill request and flu shot.    Diagnoses and all orders for this visit:    GENERALIZED ANXIETY DIS  -     clonazePAM (KLONOPIN) 0.5 MG tablet; Take 0.5 tablets (0.25 mg) by mouth 3 times daily as needed for anxiety .  45 tablets is a 30 day supply. #45 with 2 refills    Acute bronchitis with symptoms > 10 days: refill if needed  -     albuterol (PROAIR HFA/PROVENTIL HFA/VENTOLIN HFA) 108 (90 BASE) MCG/ACT Inhaler; Inhale 2 puffs into the lungs every 6 hours as needed for shortness of breath / dyspnea or wheezing    EUGENIE (generalized anxiety disorder)  -     Discontinue: amitriptyline (ELAVIL) 75 MG tablet; Take 1 tablet (75 mg) by mouth At Bedtime  -     amitriptyline (ELAVIL) 75 MG tablet; Take 1 tablet (75 mg) by mouth At Bedtime    Need for prophylactic vaccination and inoculation against influenza  -     FLU VAC, SPLIT VIRUS IM > 3 YO (QUADRIVALENT) [07058]  -     Vaccine Administration, Initial [86175]    Morbid obesity due to excess calories (H)        Patient Instructions     Next visit in 3 months sooner if needed.      Luca Glasgow MD  [facility]  Injectable Influenza Immunization Documentation    1.  Is the person to be vaccinated sick today?   No    2. Does the person to be vaccinated have an allergy to a component   of the vaccine?   No  Egg Allergy Algorithm Link    3. Has the person to be vaccinated ever had a serious reaction   to influenza vaccine in the past?   No    4. Has the person to be vaccinated ever had Guillain-Barré syndrome?   No    Form completed by Nathalia Mitchell [facility]

## 2017-11-09 NOTE — MR AVS SNAPSHOT
After Visit Summary   11/9/2017    Cris Priest    MRN: [id number]           Patient Information     Date Of Birth          1960        Visit Information        Provider Department      11/9/2017 3:20 PM Luca Glasgow MD [facility]        Today's Diagnoses     Need for prophylactic vaccination and inoculation against influenza    -  1    GENERALIZED ANXIETY DIS        Acute bronchitis with symptoms > 10 days        EUGENIE (generalized anxiety disorder)          Care Instructions      Next visit in 3 months sooner if needed.    Thank you for choosing [facility].  You may be receiving a survey in the mail from [facility] regarding your visit today.  Please take a few minutes to complete and return the survey to let us know how we are doing.      If you have questions or concerns, please contact us via [employer] or you can contact your care team at [phone].    Our Clinic hours are:  Monday 6:40 am  to 7:00 pm  Tuesday -Friday 6:40 am to 5:00 pm    The [place] outpatient lab hours are:  Monday - Friday 6:10 am to 4:45 pm  Saturdays 7:00 am to 11:00 am  Appointments are required, call [phone]    If you have clinical questions after hours or would like to schedule an appointment,  call the clinic at [phone].          Follow-ups after your visit        Who to contact     If you have questions or need follow up information about today's clinic visit or your schedule please contact [facility] directly at [phone].  Normal or non-critical lab and imaging results will be communicated to you by [employer], letter or phone within 4 business days after the clinic has received the results. If you do not hear from us within 7 days, please contact the clinic through [employer] or phone. If you have a critical or abnormal lab result, we will notify you by phone as soon as possible.  Submit refill requests through [employer] or call your pharmacy and they will forward  "the refill request to us. Please allow 3 business days for your refill to be completed.          Additional Information About Your Visit        [employer] Information     [employer] gives you secure access to your electronic health record. If you see a primary care provider, you can also send messages to your care team and make appointments. If you have questions, please call your primary care clinic.  If you do not have a primary care provider, please call [phone] and they will assist you.        Care EveryWhere ID     This is your Care EveryWhere ID. This could be used by other organizations to access your [facility] medical records  [id number]        Your Vitals Were     Pulse Temperature Height Pulse Oximetry BMI (Body Mass Index)       80 99  F (37.2  C) (Tympanic) 5' 6.25\" (1.683 m) 96% 40.5 kg/m2        Blood Pressure from Last 3 Encounters:   11/09/17 134/88   07/05/17 (!) 142/97   05/02/17 134/85    Weight from Last 3 Encounters:   11/09/17 252 lb 12.8 oz (114.7 kg)   07/05/17 253 lb (114.8 kg)   05/02/17 255 lb 9.6 oz (115.9 kg)              We Performed the Following     FLU VAC, SPLIT VIRUS IM > 3 YO (QUADRIVALENT) [73474]     Vaccine Administration, Initial [61307]          Today's Medication Changes          These changes are accurate as of: 11/9/17  4:39 PM.  If you have any questions, ask your nurse or doctor.               Start taking these medicines.        Dose/Directions    amitriptyline 75 MG tablet   Commonly known as:  ELAVIL   Used for:  EUGENIE (generalized anxiety disorder)   Started by:  Luca Glasgow MD        Dose:  75 mg   Take 1 tablet (75 mg) by mouth At Bedtime   Quantity:  15 tablet   Refills:  0         These medicines have changed or have updated prescriptions.        Dose/Directions    clonazePAM 0.5 MG tablet   Commonly known as:  klonoPIN   This may have changed:  Another medication with the same name was removed. Continue taking this medication, and follow the directions " you see here.   Used for:  Generalized anxiety disorder   Changed by:  Luca Glasgow MD        Dose:  0.25 mg   Take 0.5 tablets (0.25 mg) by mouth 3 times daily as needed for anxiety .  45 tablets is a 30 day supply.   Quantity:  45 tablet   Refills:  2         Stop taking these medicines if you haven't already. Please contact your care team if you have questions.     amLODIPine 10 MG tablet   Commonly known as:  NORVASC   Stopped by:  Luca Glasgow MD                Where to get your medicines      These medications were sent to [facility] [address]  [address] [address]     Phone:  [phone]     albuterol 108 (90 BASE) MCG/ACT Inhaler         Some of these will need a paper prescription and others can be bought over the counter.  Ask your nurse if you have questions.     Bring a paper prescription for each of these medications     amitriptyline 75 MG tablet    clonazePAM 0.5 MG tablet                Primary Care Provider Office Phone # Fax #    Luca Glasgow [phone] [address] [address]        Equal Access to Services     SHIVAM GUAMAN AH: Hadii aad ku hadasho Soomaali, waaxda luqadaha, qaybta kaalmada adeegyada, waxay beboin hayalisan bee shaikh. So [facility] [phone].    ATENCIÓN: Si habla español, tiene a jerry disposición servicios gratuitos de asistencia lingüística. Keisha al [phone].    We comply with applicable federal civil rights laws and [place] laws. We do not discriminate on the basis of race, color, national origin, age, disability, sex, sexual orientation, or gender identity.            Thank you!     Thank you for choosing [facility]  for your care. Our goal is always to provide you with excellent care. Hearing back from our patients is one way we can continue to improve our services. Please take a few minutes to complete the written survey that you may  receive in the mail after your visit with us. Thank you!             Your Updated Medication List - Protect others around you: Learn how to safely use, store and throw away your medicines at www.disposemymeds.org.          This list is accurate as of: 11/9/17  4:39 PM.  Always use your most recent med list.                   Brand Name Dispense Instructions for use Diagnosis    albuterol 108 (90 BASE) MCG/ACT Inhaler    PROAIR HFA/PROVENTIL HFA/VENTOLIN HFA    1 Inhaler    Inhale 2 puffs into the lungs every 6 hours as needed for shortness of breath / dyspnea or wheezing    Acute bronchitis with symptoms > 10 days       amitriptyline 75 MG tablet    ELAVIL    15 tablet    Take 1 tablet (75 mg) by mouth At Bedtime    EUGENIE (generalized anxiety disorder)       azaTHIOprine 50 MG tablet    IMURAN    270 tablet    Take 1 tablet (50 mg) by mouth 3 times daily    Systemic lupus erythematosus (H)       calcium carbonate 1250 MG tablet    OS-DEJUAN 500 mg [place]. Ca     1 TABLET ORALLY 3 TIMES DAILY        clonazePAM 0.5 MG tablet    klonoPIN    45 tablet    Take 0.5 tablets (0.25 mg) by mouth 3 times daily as needed for anxiety .  45 tablets is a 30 day supply.    Generalized anxiety disorder       conjugated estrogens cream    PREMARIN    12 g    Place 0.5 g vaginally twice a week    Vaginal atrophy, Postmenopausal status       cyclobenzaprine 10 MG tablet    FLEXERIL    60 tablet    Take 1 tablet (10 mg) by mouth 2 times daily as needed for muscle spasms Start at night only, and increase as tolerated to daytime use, up to three times a day.  Take regularly at least 1-2 weeks, for any pain flair.    Muscle spasm       hydroxychloroquine 200 MG tablet    PLAQUENIL    270 tablet    Take 3 tablets (600 mg) by mouth daily    Systemic lupus erythematosus (H)       hydrOXYzine 25 MG tablet    ATARAX    60 tablet    Take 1-2 tablets (25-50 mg) by mouth every 6 hours as needed for itching or anxiety    Primary osteoarthritis of left  knee       predniSONE 1 MG tablet    DELTASONE     Take 1 tablet (1 mg) by mouth daily    Systemic lupus erythematosus (H)       sertraline 100 MG tablet    ZOLOFT    180 tablet    Take 2 tablets (200 mg) by mouth daily    Recurrent major depressive disorder, in partial remission (H)       VITAMIN D PO      Take 5,000 Units by mouth every evening

## 2017-11-09 NOTE — PATIENT INSTRUCTIONS
Next visit in 3 months sooner if needed.    Thank you for choosing [facility].  You may be receiving a survey in the mail from [facility] regarding your visit today.  Please take a few minutes to complete and return the survey to let us know how we are doing.      If you have questions or concerns, please contact us via [employer] or you can contact your care team at [phone].    Our Clinic hours are:  Monday 6:40 am  to 7:00 pm  Tuesday -Friday 6:40 am to 5:00 pm    The [place] outpatient lab hours are:  Monday - Friday 6:10 am to 4:45 pm  Saturdays 7:00 am to 11:00 am  Appointments are required, call [phone]    If you have clinical questions after hours or would like to schedule an appointment,  call the clinic at [phone].

## 2017-12-11 ENCOUNTER — MYC MEDICAL ADVICE (OUTPATIENT)
Dept: RHEUMATOLOGY | Facility: CLINIC | Age: 57
End: 2017-12-11

## 2017-12-27 ENCOUNTER — MYC MEDICAL ADVICE (OUTPATIENT)
Dept: RHEUMATOLOGY | Facility: CLINIC | Age: 57
End: 2017-12-27

## 2018-02-09 ENCOUNTER — OFFICE VISIT (OUTPATIENT)
Dept: FAMILY MEDICINE | Facility: CLINIC | Age: 58
End: 2018-02-09
Payer: COMMERCIAL

## 2018-02-09 VITALS
HEIGHT: 66 IN | DIASTOLIC BLOOD PRESSURE: 86 MMHG | SYSTOLIC BLOOD PRESSURE: 148 MMHG | HEART RATE: 78 BPM | WEIGHT: 255 LBS | TEMPERATURE: 98.8 F | BODY MASS INDEX: 40.98 KG/M2

## 2018-02-09 DIAGNOSIS — M32.9 SYSTEMIC LUPUS ERYTHEMATOSUS, UNSPECIFIED SLE TYPE, UNSPECIFIED ORGAN INVOLVEMENT STATUS (H): Primary | ICD-10-CM

## 2018-02-09 DIAGNOSIS — F33.41 RECURRENT MAJOR DEPRESSIVE DISORDER, IN PARTIAL REMISSION (H): ICD-10-CM

## 2018-02-09 DIAGNOSIS — F41.1 GAD (GENERALIZED ANXIETY DISORDER): ICD-10-CM

## 2018-02-09 DIAGNOSIS — Z12.12 SCREENING FOR MALIGNANT NEOPLASM OF THE RECTUM: ICD-10-CM

## 2018-02-09 DIAGNOSIS — E66.01 MORBID OBESITY DUE TO EXCESS CALORIES (H): ICD-10-CM

## 2018-02-09 DIAGNOSIS — F41.1 GENERALIZED ANXIETY DISORDER: ICD-10-CM

## 2018-02-09 PROCEDURE — 99214 OFFICE O/P EST MOD 30 MIN: CPT | Performed by: FAMILY MEDICINE

## 2018-02-09 RX ORDER — PREDNISONE 1 MG/1
1 TABLET ORAL DAILY
Qty: 90 TABLET | Refills: 3 | Status: SHIPPED | OUTPATIENT
Start: 2018-02-09 | End: 2020-09-16

## 2018-02-09 RX ORDER — AMITRIPTYLINE HYDROCHLORIDE 75 MG/1
75 TABLET ORAL AT BEDTIME
Qty: 90 TABLET | Refills: 3 | Status: SHIPPED | OUTPATIENT
Start: 2018-02-09 | End: 2019-03-21

## 2018-02-09 RX ORDER — CLONAZEPAM 0.5 MG/1
0.25 TABLET ORAL 3 TIMES DAILY PRN
Qty: 45 TABLET | Refills: 5 | Status: SHIPPED | OUTPATIENT
Start: 2018-02-09 | End: 2018-08-30

## 2018-02-09 RX ORDER — PREDNISONE 10 MG/1
TABLET ORAL
Qty: 40 TABLET | Refills: 3 | Status: SHIPPED | OUTPATIENT
Start: 2018-02-09 | End: 2020-09-16

## 2018-02-09 RX ORDER — HYDROXYCHLOROQUINE SULFATE 200 MG/1
600 TABLET, FILM COATED ORAL DAILY
Qty: 270 TABLET | Refills: 3 | Status: SHIPPED | OUTPATIENT
Start: 2018-02-09 | End: 2019-05-28

## 2018-02-09 RX ORDER — AZATHIOPRINE 50 MG/1
50 TABLET ORAL 3 TIMES DAILY
Qty: 270 TABLET | Refills: 3 | Status: SHIPPED | OUTPATIENT
Start: 2018-02-09 | End: 2019-05-30

## 2018-02-09 RX ORDER — SERTRALINE HYDROCHLORIDE 100 MG/1
200 TABLET, FILM COATED ORAL DAILY
Qty: 180 TABLET | Refills: 3 | Status: SHIPPED | OUTPATIENT
Start: 2018-02-09 | End: 2019-03-21

## 2018-02-09 ASSESSMENT — ANXIETY QUESTIONNAIRES
5. BEING SO RESTLESS THAT IT IS HARD TO SIT STILL: NOT AT ALL
3. WORRYING TOO MUCH ABOUT DIFFERENT THINGS: SEVERAL DAYS
IF YOU CHECKED OFF ANY PROBLEMS ON THIS QUESTIONNAIRE, HOW DIFFICULT HAVE THESE PROBLEMS MADE IT FOR YOU TO DO YOUR WORK, TAKE CARE OF THINGS AT HOME, OR GET ALONG WITH OTHER PEOPLE: NOT DIFFICULT AT ALL
7. FEELING AFRAID AS IF SOMETHING AWFUL MIGHT HAPPEN: NOT AT ALL
1. FEELING NERVOUS, ANXIOUS, OR ON EDGE: NOT AT ALL
2. NOT BEING ABLE TO STOP OR CONTROL WORRYING: SEVERAL DAYS
GAD7 TOTAL SCORE: 2
6. BECOMING EASILY ANNOYED OR IRRITABLE: NOT AT ALL

## 2018-02-09 ASSESSMENT — PATIENT HEALTH QUESTIONNAIRE - PHQ9: 5. POOR APPETITE OR OVEREATING: NOT AT ALL

## 2018-02-09 NOTE — MR AVS SNAPSHOT
After Visit Summary   2/9/2018    Cris Priest    MRN: [id number]           Patient Information     Date Of Birth          1960        Visit Information        Provider Department      2/9/2018 2:40 PM Luca Glasgow MD [facility]        Today's Diagnoses     Screening for malignant neoplasm of the rectum    -  1    GENERALIZED ANXIETY DIS        Recurrent major depressive disorder, in partial remission (H)        EUGENIE (generalized anxiety disorder)        Systemic lupus erythematosus, unspecified SLE type, unspecified organ involvement status (H)          Care Instructions    Probably broke the toe or very bad broken blood vessel causing all the swelling/bruising  Keep buster taping.      I sent refills for the rest of medications.  You will be due for the next clinic visit in 6 months and we'll be due for labs.          Follow-ups after your visit        Future tests that were ordered for you today     Open Future Orders        Priority Expected Expires Ordered    Fecal colorectal cancer screen (FIT) Routine 2/9/2018 11/1/2018 2/9/2018            Who to contact     If you have questions or need follow up information about today's clinic visit or your schedule please contact [facility] directly at [phone].  Normal or non-critical lab and imaging results will be communicated to you by MyChart, letter or phone within 4 business days after the clinic has received the results. If you do not hear from us within 7 days, please contact the clinic through SocialF5hart or phone. If you have a critical or abnormal lab result, we will notify you by phone as soon as possible.  Submit refill requests through [employer] or call your pharmacy and they will forward the refill request to us. Please allow 3 business days for your refill to be completed.          Additional Information About Your Visit        MyChart Information     [employer] gives you secure access to your electronic  "health record. If you see a primary care provider, you can also send messages to your care team and make appointments. If you have questions, please call your primary care clinic.  If you do not have a primary care provider, please call [phone] and they will assist you.        Care EveryWhere ID     This is your Care EveryWhere ID. This could be used by other organizations to access your [place] medical records  [id number]        Your Vitals Were     Pulse Temperature Height BMI (Body Mass Index)          78 98.8  F (37.1  C) (Tympanic) 5' 6\" (1.676 m) 41.16 kg/m2         Blood Pressure from Last 3 Encounters:   02/09/18 (!) 156/98   11/09/17 134/88   07/05/17 (!) 142/97    Weight from Last 3 Encounters:   02/09/18 255 lb (115.7 kg)   11/09/17 252 lb 12.8 oz (114.7 kg)   07/05/17 253 lb (114.8 kg)                 Today's Medication Changes          These changes are accurate as of 2/9/18  3:19 PM.  If you have any questions, ask your nurse or doctor.               These medicines have changed or have updated prescriptions.        Dose/Directions    * predniSONE 1 MG tablet   Commonly known as:  DELTASONE   This may have changed:  Another medication with the same name was added. Make sure you understand how and when to take each.   Used for:  Systemic lupus erythematosus, unspecified SLE type, unspecified organ involvement status (H)   Changed by:  Luca Glasgow MD        Dose:  1 mg   Take 1 tablet (1 mg) by mouth daily   Quantity:  90 tablet   Refills:  3       * predniSONE 10 MG tablet   Commonly known as:  DELTASONE   This may have changed:  You were already taking a medication with the same name, and this prescription was added. Make sure you understand how and when to take each.   Used for:  Systemic lupus erythematosus, unspecified SLE type, unspecified organ involvement status (H)   Changed by:  Luca Glasgow MD        Use for flare 20mg, then 10mg, then 5mg, then 1mg   Quantity:  40 " tablet   Refills:  3       * Notice:  This list has 2 medication(s) that are the same as other medications prescribed for you. Read the directions carefully, and ask your doctor or other care provider to review them with you.         Where to get your medicines      These medications were sent [place] [facility] [place] - [address]  [address] [address]     Phone:  [phone]     amitriptyline 75 MG tablet    azaTHIOprine 50 MG tablet    hydroxychloroquine 200 MG tablet    predniSONE 1 MG tablet    predniSONE 10 MG tablet    sertraline 100 MG tablet         Some of these will need a paper prescription and others can be bought over the counter.  Ask your nurse if you have questions.     Bring a paper prescription for each of these medications     clonazePAM 0.5 MG tablet                Primary Care Provider Office Phone # Fax #    Luca Glasgow [phone]       [address] [address]        Equal Access to Services     SHIVAM GUAMAN : Hadii holley robertso Sochaya, waaxda luqadaha, qaybta kaalmada adeegyada, julien cooley . So [facility] [phone].    ATENCIÓN: Si habla español, tiene a jerry disposición servicios gratuitos de asistencia lingüística. [facility] [phone].    We comply with applicable federal civil rights laws and [place] laws. We do not discriminate on the basis of race, color, national origin, age, disability, sex, sexual orientation, or gender identity.            Thank you!     Thank you for choosing [facility]  for your care. Our goal is always to provide you with excellent care. Hearing back from our patients is one way we can continue to improve our services. Please take a few minutes to complete the written survey that you may receive in the mail after your visit with us. Thank you!             Your Updated Medication List - Protect others around you: Learn how to safely use,  store and throw away your medicines at www.disposemymeds.org.          This list is accurate as of 2/9/18  3:19 PM.  Always use your most recent med list.                   Brand Name Dispense Instructions for use Diagnosis    acetaminophen-codeine 300-30 MG per tablet    TYLENOL w/CODEINE No. 3    15 tablet    Take 1 tablet by mouth every 4 hours as needed for mild pain Max of 4/day.  Avoid taking before bed time    Other forms of systemic lupus erythematosus, unspecified organ involvement status (H)       albuterol 108 (90 BASE) MCG/ACT Inhaler    PROAIR HFA/PROVENTIL HFA/VENTOLIN HFA    1 Inhaler    Inhale 2 puffs into the lungs every 6 hours as needed for shortness of breath / dyspnea or wheezing    Acute bronchitis with symptoms > 10 days       amitriptyline 75 MG tablet    ELAVIL    90 tablet    Take 1 tablet (75 mg) by mouth At Bedtime    EUGENIE (generalized anxiety disorder)       azaTHIOprine 50 MG tablet    IMURAN    270 tablet    Take 1 tablet (50 mg) by mouth 3 times daily    Systemic lupus erythematosus, unspecified SLE type, unspecified organ involvement status (H)       calcium carbonate 1250 MG tablet    OS-DEJUAN 500 mg [place]. Ca     1 TABLET ORALLY 3 TIMES DAILY        clonazePAM 0.5 MG tablet    klonoPIN    45 tablet    Take 0.5 tablets (0.25 mg) by mouth 3 times daily as needed for anxiety .  45 tablets is a 30 day supply.    Generalized anxiety disorder       conjugated estrogens cream    PREMARIN    12 g    Place 0.5 g vaginally twice a week    Vaginal atrophy, Postmenopausal status       cyclobenzaprine 10 MG tablet    FLEXERIL    60 tablet    Take 1 tablet (10 mg) by mouth 2 times daily as needed for muscle spasms Start at night only, and increase as tolerated to daytime use, up to three times a day.  Take regularly at least 1-2 weeks, for any pain flair.    Muscle spasm       hydroxychloroquine 200 MG tablet    PLAQUENIL    270 tablet    Take 3 tablets (600 mg) by mouth daily    Systemic lupus  erythematosus, unspecified SLE type, unspecified organ involvement status (H)       hydrOXYzine 25 MG tablet    ATARAX    60 tablet    Take 1-2 tablets (25-50 mg) by mouth every 6 hours as needed for itching or anxiety    Primary osteoarthritis of left knee       * predniSONE 1 MG tablet    DELTASONE    90 tablet    Take 1 tablet (1 mg) by mouth daily    Systemic lupus erythematosus, unspecified SLE type, unspecified organ involvement status (H)       * predniSONE 10 MG tablet    DELTASONE    40 tablet    Use for flare 20mg, then 10mg, then 5mg, then 1mg    Systemic lupus erythematosus, unspecified SLE type, unspecified organ involvement status (H)       sertraline 100 MG tablet    ZOLOFT    180 tablet    Take 2 tablets (200 mg) by mouth daily    Recurrent major depressive disorder, in partial remission (H)       VITAMIN D PO      Take 5,000 Units by mouth every evening        * Notice:  This list has 2 medication(s) that are the same as other medications prescribed for you. Read the directions carefully, and ask your doctor or other care provider to review them with you.

## 2018-02-09 NOTE — NURSING NOTE
"Chief Complaint   Patient presents with     Refill Request     Klonopin     Toe Pain     Right foot, middle toe        Initial BP (!) 156/98  Pulse 78  Temp 98.8  F (37.1  C) (Tympanic)  Ht 5' 6\" (1.676 m)  Wt 255 lb (115.7 kg)  BMI 41.16 kg/m2 Estimated body mass index is 41.16 kg/(m^2) as calculated from the following:    Height as of this encounter: 5' 6\" (1.676 m).    Weight as of this encounter: 255 lb (115.7 kg).  Medication Reconciliation: complete  "

## 2018-02-09 NOTE — PATIENT INSTRUCTIONS
Probably broke the toe or very bad broken blood vessel causing all the swelling/bruising  Keep buster taping.      I sent refills for the rest of medications.  You will be due for the next clinic visit in 6 months and we'll be due for labs.

## 2018-02-09 NOTE — PROGRESS NOTES
SUBJECTIVE:   Cris Priest is a 57 year old female who presents to clinic today for the following health issues:      Depression and Anxiety Follow-Up    Status since last visit: Same     Other associated symptoms:None    Complicating factors:     Significant life event: No     Current substance abuse: None    PHQ-9 4/4/2016 10/14/2016 2/2/2017   Total Score 1 0 1   Q9: Suicide Ideation Not at all Not at all Not at all     EUGENIE-7 SCORE 4/4/2016 10/14/2016 2/2/2017   Total Score - - -   Total Score 1 3 1     In the past two weeks have you had thoughts of suicide or self-harm?  No.    Do you have concerns about your personal safety or the safety of others?   No  PHQ-9  English  PHQ-9   Any Language  EUGENIE-7  Suicide Assessment Five-step Evaluation and Treatment (SAFE-T)    Amount of exercise or physical activity: 2-3 days/week for an average of 15-30 minutes    Problems taking medications regularly: No    Medication side effects: none    Diet: low salt        Concern - right foot, middle toe   Onset: since 1/22     Description:   Chair fell on her toe, swollen, bruised, throbbing pain at night time     Intensity: moderate    Progression of Symptoms: worsening    Precipitating factors:   Worsened by: When she stands on her foot     Alleviating factors:  Improved by: none     Therapies Tried and outcome: Tylenol number 3     Problem list and histories reviewed & adjusted, as indicated.  Additional history: as documented    BP Readings from Last 3 Encounters:   02/09/18 (!) 156/98   11/09/17 134/88   07/05/17 (!) 142/97    Wt Readings from Last 3 Encounters:   02/09/18 255 lb (115.7 kg)   11/09/17 252 lb 12.8 oz (114.7 kg)   07/05/17 253 lb (114.8 kg)          Reviewed and updated as needed this visit by clinical staff  Tobacco  Allergies  Med Hx  Surg Hx  Fam Hx  Soc Hx      Reviewed and updated as needed this visit by Provider         ROS:  C: NEGATIVE for fever, chills, change in weight  R: NEGATIVE for  "significant cough or SOB  CV: NEGATIVE for chest pain, palpitations or peripheral edema    OBJECTIVE:     /86 (BP Location: Right arm, Patient Position: Sitting, Cuff Size: Adult Large)  Pulse 78  Temp 98.8  F (37.1  C) (Tympanic)  Ht 5' 6\" (1.676 m)  Wt 255 lb (115.7 kg)  BMI 41.16 kg/m2  Body mass index is 41.16 kg/(m^2).  GENERAL: healthy, alert and no distress  MS: RLE exam shows right third toe swelling and bruising    Diagnostic Test Results:  none     ASSESSMENT/PLAN:       Cris was seen today for refill request, toe pain and health maintenance.    Diagnoses and all orders for this visit:    Systemic lupus erythematosus, unspecified SLE type, unspecified organ involvement status (H): stable, had followed wit rheumatology moved and has been stable on these medication for years so refill  -plan for patient to call if needing T#3 for lupus flare  -     hydroxychloroquine (PLAQUENIL) 200 MG tablet; Take 3 tablets (600 mg) by mouth daily  -     azaTHIOprine (IMURAN) 50 MG tablet; Take 1 tablet (50 mg) by mouth 3 times daily  -     predniSONE (DELTASONE) 1 MG tablet; Take 1 tablet (1 mg) by mouth daily  -     predniSONE (DELTASONE) 10 MG tablet; Use for flare 20mg, then 10mg, then 5mg, then 1mg    Recurrent major depressive disorder, in partial remission (H): stable on zoloft refill  -     sertraline (ZOLOFT) 100 MG tablet; Take 2 tablets (200 mg) by mouth daily    GENERALIZED ANXIETY DIS: well controlled  -     clonazePAM (KLONOPIN) 0.5 MG tablet; Take 0.5 tablets (0.25 mg) by mouth 3 times daily as needed for anxiety .  45 tablets is a 30 day supply.    EUGENIE (generalized anxiety disorder):   -     amitriptyline (ELAVIL) 75 MG tablet; Take 1 tablet (75 mg) by mouth At Bedtime    Screening for malignant neoplasm of the rectum  -     Fecal colorectal cancer screen (FIT); Future    Morbid Obesity: discussed, plan diet    Patient Instructions   Probably broke the toe or very bad broken blood vessel causing " all the swelling/bruising  Keep buster taping.      I sent refills for the rest of medications.  You will be due for the next clinic visit in 6 months and we'll be due for labs.      Luca Glasgow MD  [facility]

## 2018-02-10 ASSESSMENT — ANXIETY QUESTIONNAIRES: GAD7 TOTAL SCORE: 2

## 2018-02-10 ASSESSMENT — PATIENT HEALTH QUESTIONNAIRE - PHQ9: SUM OF ALL RESPONSES TO PHQ QUESTIONS 1-9: 1

## 2018-02-12 ENCOUNTER — MYC MEDICAL ADVICE (OUTPATIENT)
Dept: FAMILY MEDICINE | Facility: CLINIC | Age: 58
End: 2018-02-12

## 2018-02-12 DIAGNOSIS — M17.12 PRIMARY OSTEOARTHRITIS OF LEFT KNEE: ICD-10-CM

## 2018-02-13 ENCOUNTER — ALLIED HEALTH/NURSE VISIT (OUTPATIENT)
Dept: FAMILY MEDICINE | Facility: CLINIC | Age: 58
End: 2018-02-13
Payer: COMMERCIAL

## 2018-02-13 DIAGNOSIS — M17.12 PRIMARY OSTEOARTHRITIS OF LEFT KNEE: Primary | ICD-10-CM

## 2018-02-13 PROCEDURE — 99207 ZZC NO CHARGE NURSE ONLY: CPT

## 2018-02-13 RX ORDER — OXYCODONE HYDROCHLORIDE 5 MG/1
5 TABLET ORAL EVERY 8 HOURS PRN
Qty: 15 TABLET | Refills: 0 | Status: SHIPPED | OUTPATIENT
Start: 2018-02-13 | End: 2018-08-30

## 2018-02-13 NOTE — TELEPHONE ENCOUNTER
Yes, we talked about it at the visit and I must have forgotten to address the pain.    ASSESSMENT/PLAN  Diagnoses and all orders for this visit:    Primary osteoarthritis of left knee  -     oxyCODONE IR (ROXICODONE) 5 MG tablet; Take 1 tablet (5 mg) by mouth every 8 hours as needed for moderate to severe pain For broken toe    Printed Rx, please notify patient when ready to .    Luca Glasgow MD  [facility]

## 2018-02-13 NOTE — MR AVS SNAPSHOT
After Visit Summary   2/13/2018    Cris Priest    MRN: [id number]           Patient Information     Date Of Birth          1960        Visit Information        Provider Department      2/13/2018 2:15 PM CHACE LEWIS/MATIAS COPE [facility]        Today's Diagnoses     Primary osteoarthritis of left knee    -  1       Follow-ups after your visit        Who to contact     If you have questions or need follow up information about today's clinic visit or your schedule please contact [facility] directly at [phone].  Normal or non-critical lab and imaging results will be communicated to you by Flosson[employer]art, letter or phone within 4 business days after the clinic has received the results. If you do not hear from us within 7 days, please contact the clinic through Cloud[employer] or phone. If you have a critical or abnormal lab result, we will notify you by phone as soon as possible.  Submit refill requests through [employer] or call your pharmacy and they will forward the refill request to us. Please allow 3 business days for your refill to be completed.          Additional Information About Your Visit        MyChart Information     [employer] gives you secure access to your electronic health record. If you see a primary care provider, you can also send messages to your care team and make appointments. If you have questions, please call your primary care clinic.  If you do not have a primary care provider, please call [phone] and they will assist you.        Care EveryWhere ID     This is your Care EveryWhere ID. This could be used by other organizations to access your [place] medical records  [id number]         Blood Pressure from Last 3 Encounters:   02/09/18 148/86   11/09/17 134/88   07/05/17 (!) 142/97    Weight from Last 3 Encounters:   02/09/18 255 lb (115.7 kg)   11/09/17 252 lb 12.8 oz (114.7 kg)   07/05/17 253 lb (114.8 kg)              Today, you had the following     No orders  found for display       Primary Care Provider Office Phone # Fax #    Luca Glasgow [phone] [address] [address]        Equal Access to Services     SHIVAM GUAMAN : Hadii aad ku hadhollyo Socherylali, waaxda luqadaha, qaybta kaalmada adeegyada, julien cheek yasir shaikh. So [facility] [phone].    ATENCIÓN: Si habla español, tiene a jerry disposición servicios gratuitos de asistencia lingüística. Llame al [phone].    We comply with applicable federal civil rights laws and [place] laws. We do not discriminate on the basis of race, color, national origin, age, disability, sex, sexual orientation, or gender identity.            Thank you!     Thank you for choosing [facility]  for your care. Our goal is always to provide you with excellent care. Hearing back from our patients is one way we can continue to improve our services. Please take a few minutes to complete the written survey that you may receive in the mail after your visit with us. Thank you!             Your Updated Medication List - Protect others around you: Learn how to safely use, store and throw away your medicines at www.disposemymeds.org.          This list is accurate as of 2/13/18  2:26 PM.  Always use your most recent med list.                   Brand Name Dispense Instructions for use Diagnosis    acetaminophen-codeine 300-30 MG per tablet    TYLENOL w/CODEINE No. 3    15 tablet    Take 1 tablet by mouth every 4 hours as needed for mild pain Max of 4/day.  Avoid taking before bed time    Other forms of systemic lupus erythematosus, unspecified organ involvement status (H)       albuterol 108 (90 BASE) MCG/ACT Inhaler    PROAIR HFA/PROVENTIL HFA/VENTOLIN HFA    1 Inhaler    Inhale 2 puffs into the lungs every 6 hours as needed for shortness of breath / dyspnea or wheezing    Acute bronchitis with symptoms > 10 days       amitriptyline 75 MG tablet    ELAVIL    90 tablet     Take 1 tablet (75 mg) by mouth At Bedtime    EUGENIE (generalized anxiety disorder)       azaTHIOprine 50 MG tablet    IMURAN    270 tablet    Take 1 tablet (50 mg) by mouth 3 times daily    Systemic lupus erythematosus, unspecified SLE type, unspecified organ involvement status (H)       calcium carbonate 1250 MG tablet    OS-DEJUAN 500 mg [place]. Ca     1 TABLET ORALLY 3 TIMES DAILY        clonazePAM 0.5 MG tablet    klonoPIN    45 tablet    Take 0.5 tablets (0.25 mg) by mouth 3 times daily as needed for anxiety .  45 tablets is a 30 day supply.    Generalized anxiety disorder       conjugated estrogens cream    PREMARIN    12 g    Place 0.5 g vaginally twice a week    Vaginal atrophy, Postmenopausal status       cyclobenzaprine 10 MG tablet    FLEXERIL    60 tablet    Take 1 tablet (10 mg) by mouth 2 times daily as needed for muscle spasms Start at night only, and increase as tolerated to daytime use, up to three times a day.  Take regularly at least 1-2 weeks, for any pain flair.    Muscle spasm       hydroxychloroquine 200 MG tablet    PLAQUENIL    270 tablet    Take 3 tablets (600 mg) by mouth daily    Systemic lupus erythematosus, unspecified SLE type, unspecified organ involvement status (H)       hydrOXYzine 25 MG tablet    ATARAX    60 tablet    Take 1-2 tablets (25-50 mg) by mouth every 6 hours as needed for itching or anxiety    Primary osteoarthritis of left knee       oxyCODONE IR 5 MG tablet    ROXICODONE    15 tablet    Take 1 tablet (5 mg) by mouth every 8 hours as needed for moderate to severe pain For broken toe    Primary osteoarthritis of left knee       * predniSONE 1 MG tablet    DELTASONE    90 tablet    Take 1 tablet (1 mg) by mouth daily    Systemic lupus erythematosus, unspecified SLE type, unspecified organ involvement status (H)       * predniSONE 10 MG tablet    DELTASONE    40 tablet    Use for flare 20mg, then 10mg, then 5mg, then 1mg    Systemic lupus erythematosus, unspecified SLE type,  unspecified organ involvement status (H)       sertraline 100 MG tablet    ZOLOFT    180 tablet    Take 2 tablets (200 mg) by mouth daily    Recurrent major depressive disorder, in partial remission (H)       VITAMIN D PO      Take 5,000 Units by mouth every evening        * Notice:  This list has 2 medication(s) that are the same as other medications prescribed for you. Read the directions carefully, and ask your doctor or other care provider to review them with you.

## 2018-02-13 NOTE — NURSING NOTE
Patient here to  an Rx that she was called was ready for . It was not at .   I did find it and handed the envelope to her.   Fabienne Ewing RNC

## 2018-02-13 NOTE — TELEPHONE ENCOUNTER
Prescription walked to  for pickup. Pt notified.    [facility] Rachel

## 2018-03-06 ENCOUNTER — MYC MEDICAL ADVICE (OUTPATIENT)
Dept: FAMILY MEDICINE | Facility: CLINIC | Age: 58
End: 2018-03-06

## 2018-03-07 NOTE — TELEPHONE ENCOUNTER
Kervin Lynch,  Your paperwork for FMLA for [employer] was received, completed and faxed to [employer] on 2/15/18.    February 15, 2018     9:19 PM   Note      Form completed, signed and [facility] at [phone]               Thank you,  Sandra Caicedo

## 2018-04-10 ENCOUNTER — APPOINTMENT (OUTPATIENT)
Dept: GENERAL RADIOLOGY | Facility: CLINIC | Age: 58
End: 2018-04-10
Attending: PHYSICIAN ASSISTANT
Payer: COMMERCIAL

## 2018-04-10 ENCOUNTER — APPOINTMENT (OUTPATIENT)
Dept: CT IMAGING | Facility: CLINIC | Age: 58
End: 2018-04-10
Attending: PHYSICIAN ASSISTANT
Payer: COMMERCIAL

## 2018-04-10 ENCOUNTER — HOSPITAL ENCOUNTER (EMERGENCY)
Facility: CLINIC | Age: 58
Discharge: HOME | End: 2018-04-10
Attending: PHYSICIAN ASSISTANT | Admitting: PHYSICIAN ASSISTANT
Payer: COMMERCIAL

## 2018-04-10 VITALS
HEIGHT: 66 IN | TEMPERATURE: 99.5 F | RESPIRATION RATE: 20 BRPM | DIASTOLIC BLOOD PRESSURE: 87 MMHG | SYSTOLIC BLOOD PRESSURE: 145 MMHG | OXYGEN SATURATION: 97 % | HEART RATE: 100 BPM

## 2018-04-10 DIAGNOSIS — S30.0XXA TRAUMATIC HEMATOMA OF BUTTOCK, INITIAL ENCOUNTER: ICD-10-CM

## 2018-04-10 DIAGNOSIS — S22.089A CLOSED FRACTURE OF TWELFTH THORACIC VERTEBRA, UNSPECIFIED FRACTURE MORPHOLOGY, INITIAL ENCOUNTER (H): ICD-10-CM

## 2018-04-10 DIAGNOSIS — M54.50 ACUTE BILATERAL LOW BACK PAIN WITHOUT SCIATICA: ICD-10-CM

## 2018-04-10 PROCEDURE — 99215 OFFICE O/P EST HI 40 MIN: CPT | Mod: Z6 | Performed by: PHYSICIAN ASSISTANT

## 2018-04-10 PROCEDURE — 72192 CT PELVIS W/O DYE: CPT

## 2018-04-10 PROCEDURE — 25000132 ZZH RX MED GY IP 250 OP 250 PS 637: Performed by: PHYSICIAN ASSISTANT

## 2018-04-10 PROCEDURE — 73502 X-RAY EXAM HIP UNI 2-3 VIEWS: CPT

## 2018-04-10 PROCEDURE — 72100 X-RAY EXAM L-S SPINE 2/3 VWS: CPT

## 2018-04-10 PROCEDURE — 72131 CT LUMBAR SPINE W/O DYE: CPT

## 2018-04-10 PROCEDURE — G0463 HOSPITAL OUTPT CLINIC VISIT: HCPCS | Mod: 25

## 2018-04-10 RX ORDER — OXYCODONE AND ACETAMINOPHEN 5; 325 MG/1; MG/1
1 TABLET ORAL EVERY 6 HOURS PRN
Qty: 10 TABLET | Refills: 0 | Status: SHIPPED | OUTPATIENT
Start: 2018-04-10 | End: 2018-04-16

## 2018-04-10 RX ORDER — OXYCODONE AND ACETAMINOPHEN 5; 325 MG/1; MG/1
1 TABLET ORAL ONCE
Status: COMPLETED | OUTPATIENT
Start: 2018-04-10 | End: 2018-04-10

## 2018-04-10 RX ADMIN — OXYCODONE HYDROCHLORIDE AND ACETAMINOPHEN 1 TABLET: 5; 325 TABLET ORAL at 19:25

## 2018-04-10 ASSESSMENT — ENCOUNTER SYMPTOMS
ARTHRALGIAS: 1
SHORTNESS OF BREATH: 0
DIZZINESS: 0
BACK PAIN: 1
WEAKNESS: 0
NUMBNESS: 0
CHEST TIGHTNESS: 0
COLOR CHANGE: 1
FEVER: 0
HEADACHES: 0

## 2018-04-10 NOTE — ED PROVIDER NOTES
History     Chief Complaint   Patient presents with     Fall     Hip Pain     HPI  Cris Priest is a 58 year old female who presents to the urgent care today after falling yesterday in her garage. Patient states she was walking out into her garage and became slightly dizzy which is common for her and was nothing new. Patient states she tried to catch herself, but ended up bouncing off of the garage door tripping and falling onto a round stool that was low to the ground. Patient states she was trying to protect her knees since she has had bilateral knee replacements in the past. Patient denies loss of consciousness, blurry or double vision, head injury, headache, confusion, persistent dizziness, nausea/vomiting. Patient states she has significant bruising, swelling, and tenderness to the right buttocks and hip with lower leg swelling noticed today. Patient states she has noticed some tingling in her right foot and right fingers from leaning on things to help her walk since the pain and swelling is pretty bad. Patient states she has a history of lupus, osteoporosis and osteopenia of the hip and back, and is currently on autoimmune suppressing medications. Patient denies blood thinners, groin pain, loss of bowel or bladder function, saddle anesthesia, numbness in lower extremities, weakness in the legs. Patient states she has tried Tylenol #3 for pain with no relief and she does not want to use them since she has a pain contract for her lupus. Patient states she is in significant pain currently from the bruising and swelling to the right buttock.     Problem List:    Patient Active Problem List    Diagnosis Date Noted     S/P total knee arthroplasty 01/07/2016     Priority: Medium     Left knee DJD 01/04/2016     Priority: Medium     On prednisone therapy 08/20/2015     Priority: Medium     Advanced directives, counseling/discussion 08/06/2014     Priority: Medium     Paperwork given to patient to fill out and  "return to [facility]       Essential hypertension 12/31/2013     Priority: Medium     Morbid obesity due to excess calories (H) 10/26/2012     Priority: Medium     Hyperlipidemia LDL goal <130 10/26/2012     Priority: Medium     Pain medication agreement 06/16/2011     Priority: Medium     On for chronic pain and fibromyalgia    Dr. Forte following  PRN tylenol #3           Osteoporosis 05/08/2008     Priority: Medium     Problem list name updated by automated process. Provider to review       GENERALIZED ANXIETY DIS 01/17/2008     Priority: Medium     Patient is followed by Mara Littlejohn DNP, RN, APRN and Dr. Moore  Med: Klonipin 0.5mg   diagnosis: EUGENIE  Maximum use per month: #45  Expected duration: lifetime  Clinic visit recommended: Q 3 month         Recurrent major depressive disorder, in partial remission (H) 06/21/2006     Priority: Medium     Systemic lupus erythematosus (H) 03/22/2005     Priority: Medium     alopecia, arthralgias, fever, no kidney dz       Fibromyalgia 03/22/2005     Priority: Medium     Problem list name updated by automated process. Provider to review          Past Medical History:    Past Medical History:   Diagnosis Date     Arthritis 1990     Depressive disorder 1963     FIBROMYALGIA      Hypertension 2011     Pneumonia 10/12/2008     Systemic lupus erythematosus (H)      Systemic lupus erythematosus (H) 3/22/2005       Past Surgical History:    Past Surgical History:   Procedure Laterality Date     ABDOMEN SURGERY       ARTHROPLASTY KNEE Left 1/4/2016    Procedure: ARTHROPLASTY KNEE;  Surgeon: Malcolm Ramsay MD;  Location: [facility] OR     BIOPSY  [id number]    Chest area \"lupus\" & 2 spots on breast & lung     C APPENDECTOMY       C LAPAROSCOPY, SURGICAL; W/ VAGINAL HYSTERECTOMY W/WO REMOVAL OVARY(S)/TUBES  10/02    Laparoscopy, Vag Hysterectomy     COLONOSCOPY  2013     HC REMOVAL OF OVARY/TUBE(S)      Salpingo-Oophorectomy, Unilateral LEFT     HC REMOVAL OF OVARY/TUBE(S)  10/02    " Salpingo-Oophorectomy, Unilateral RIGHT     HC REMOVE TONSILS/ADENOIDS,<13 Y/O      T & A <10y.o.     HEAD & NECK SURGERY  2007    2 nodes taken for test Lymphoma     ORTHOPEDIC SURGERY      TKA  right     REPAIR HAMMER TOE  11/9/2011    Procedure:REPAIR HAMMER TOE; Right excision 5th metatarsal head ; Surgeon:TEENA REHMAN; Location:[facility] OR     SURGICAL HISTORY OF -   6/04    LEFT KNEE MEDIAL MENISCECTOMY       Family History:    Family History   Problem Relation Age of Onset     Arthritis Mother      Depression Mother      Unknown/Adopted Mother      Depression Brother      Depression Sister      Depression Brother      Depression Brother      Anxiety Disorder Maternal Grandfather      Bad Anxiety was afraid to go anywhere. Cars,       Social History:  Marital Status:   [2]  Social History   Substance Use Topics     Smoking status: Former Smoker     Packs/day: 0.50     Years: 37.00     Types: Other     Quit date: 11/1/2015     Smokeless tobacco: Never Used      Comment: Use Electronic Cigarette     Alcohol use No        Medications:      oxyCODONE-acetaminophen (PERCOCET) 5-325 MG per tablet   oxyCODONE IR (ROXICODONE) 5 MG tablet   clonazePAM (KLONOPIN) 0.5 MG tablet   sertraline (ZOLOFT) 100 MG tablet   amitriptyline (ELAVIL) 75 MG tablet   hydroxychloroquine (PLAQUENIL) 200 MG tablet   azaTHIOprine (IMURAN) 50 MG tablet   predniSONE (DELTASONE) 1 MG tablet   predniSONE (DELTASONE) 10 MG tablet   acetaminophen-codeine (TYLENOL W/CODEINE NO. 3) 300-30 MG per tablet   albuterol (PROAIR HFA/PROVENTIL HFA/VENTOLIN HFA) 108 (90 BASE) MCG/ACT Inhaler   hydrOXYzine (ATARAX) 25 MG tablet   cyclobenzaprine (FLEXERIL) 10 MG tablet   conjugated estrogens (PREMARIN) vaginal cream   VITAMIN D PO   CALCIUM 500 MG OR TABS         Review of Systems   Constitutional: Negative for fever.   Respiratory: Negative for chest tightness and shortness of breath.    Musculoskeletal: Positive for arthralgias (right hjp  "pain) and back pain.   Skin: Positive for color change (significant bruising and swelling to the right buttock with tenderness ).   Neurological: Negative for dizziness, syncope, weakness, numbness and headaches.   All other systems reviewed and are negative.      Physical Exam   BP: 145/87  Pulse: 100  Temp: 99.5  F (37.5  C)  Resp: 20  Height: 167.6 cm (5' 6\")  SpO2: 97 %      Physical Exam   Constitutional: She is oriented to person, place, and time. She appears well-developed and well-nourished. She is active. No distress.   Patient appears very uncomfortable in office due to pain in right buttocks.    Neck: Normal range of motion. Neck supple.   Cardiovascular: Normal rate, regular rhythm, normal heart sounds and intact distal pulses.    Pulmonary/Chest: Effort normal and breath sounds normal.   Abdominal: Soft. Bowel sounds are normal. She exhibits no distension and no mass. There is no tenderness. There is no rebound and no guarding.   Musculoskeletal:        Right hip: She exhibits decreased range of motion (with flexion and extension due to pain in buttock), tenderness (right buttock) and swelling (right buttock). She exhibits normal strength, no bony tenderness, no crepitus, no deformity and no laceration.        Lumbar back: She exhibits decreased range of motion, bony tenderness (over lulmbar spine with palpation. ) and pain. She exhibits no swelling, no edema, no deformity, no laceration, no spasm and normal pulse.   Positive mild swelling noted to right calf with no tenderness or discoloration on exam. Dorsalis pedis and pedal pulses normal bilaterally, with good capillary refill and no discoloration. Full sensation noted to right lower extremity.    Neurological: She is alert and oriented to person, place, and time. She has normal strength. No cranial nerve deficit or sensory deficit. GCS eye subscore is 4. GCS verbal subscore is 5. GCS motor subscore is 6.   Reflex Scores:       Achilles reflexes are " 2+ on the right side and 2+ on the left side.  Skin: Skin is warm, dry and intact. Bruising and ecchymosis noted. No abrasion, no burn and no petechiae noted. She is not diaphoretic. No erythema.        Large U shaped bruising to the right buttocks with swelling and significant tenderness with palpation.    Psychiatric: She has a normal mood and affect. Her behavior is normal. Judgment and thought content normal.       ED Course     ED Course     Procedures              Critical Care time:  none               Results for orders placed or performed during the hospital encounter of 04/10/18 (from the past 24 hour(s))   XR Pelvis w Hip Right 1 View    Narrative    PELVIS AND RIGHT HIP two VIEWS   4/10/2018 6:45 PM     HISTORY: Fall yesterday. Pain to the right posterior buttock with  significant swelling. Decreased range of motion.    COMPARISON: None.      Impression    IMPRESSION: No visualized acute fracture or malalignment of the pelvis  or right hip.   XR Lumbar Spine 2/3 Views    Narrative    LUMBAR SPINE THREE VIEWS   4/10/2018 6:45 PM     HISTORY: Fall yesterday. Point tenderness over the lower lumbar spine.    COMPARISON: None.      Impression    IMPRESSION:   1. Age-indeterminate mild compression deformity of the T12 vertebral  body.  2. Normal vertebral body height of the remainder of the visualized  portion of the lumbar and lower thoracic spine.  3. Normal alignment to the lumbar spine.  4. Mild degenerative changes in the lumbar spine.   Lumbar spine CT w/o contrast    Narrative    CT LUMBAR SPINE WITHOUT CONTRAST 4/10/2018 7:40 PM     HISTORY: T12 compression fracture age indeterminate, pain in lumbar  spine with palpation and over T12. History of osteoporosis.     TECHNIQUE: Multiplanar multisequence images were obtained through the  lumbar spine without contrast. Coronal and sagittal reconstructions  were also acquired. Radiation dose for this scan was reduced using  automated exposure control,  adjustment of the mA and/or kV according  to patient size, or iterative reconstruction technique.    COMPARISON: None.    FINDINGS: Five lumbar type vertebral bodies are present. Posterior  alignment is normal. There is an anteriorly wedged deformity of T12.  There is a Schmorl's node deformity in the superior endplate  centrally. In addition, there is a benign-appearing lucency in the  left posterior half of the T12 vertebral body encroaching upon the  pedicle. There is a possible subtle fracture through this area as seen  on image 28 of series 4. No other fractures are present. Multilevel  degenerative disc and facet disease is seen in the lumbar spine.    L1-2: Minimal disc bulging and facet hypertrophy. No stenosis.    L2-3: There is a central disc protrusion, broad-based disc bulging and  facet hypertrophy causing moderate central canal stenosis.    L3-4: There is broad-based disc bulging, moderate to severe facet  hypertrophy and posterior osteophyte formation causing moderate to  severe central canal and bilateral neural foraminal stenosis.    L4-5: Broad-based posterior osteophyte formation, disc bulging and  facet hypertrophy is present causing moderate central canal stenosis  and moderate to severe bilateral neural foraminal stenosis.    L5-S1: Broad-based disc bulge and moderate facet hypertrophy is  present causing moderate to severe left-sided foraminal stenosis and  mild central canal stenosis.    Paraspinal soft tissues: Vascular calcifications noted.      Impression    IMPRESSION:  1. Probable subtle fracture through the proximal left pedicle of T12  abutting a benign-appearing lucency. The anteriorly wedging of the T12  vertebral body appears chronic. If clinically indicated, MRI might be  helpful in further evaluation.  2. Multilevel degenerative disc and facet disease.    CASSIE BROWN MD   CT Pelvis w/o Contrast    Narrative    CT PELVIS WITHOUT CONTRAST 4/10/2018 7:41 PM     HISTORY: Fall injury.  Significant bruising and swelling to right  buttocks with decreased range of motion of hip and lumbar pain.    TECHNIQUE: Axial images were obtained through the bony pelvis without  contrast. Coronal and sagittal reconstructions were also acquired.  Radiation dose for this scan was reduced using automated exposure  control, adjustment of the mA and/or kV according to patient size, or  iterative reconstruction technique.    FINDINGS: The bony pelvis is intact. There is no evidence for any  fractures. The sacrum and proximal femurs also appear intact. A right  buttock hematoma is noted.      Impression    IMPRESSION:  1. Right buttock hematoma.  2. No evidence for any pelvic or proximal femoral fracture.    CASSIE BROWN MD       Medications   oxyCODONE-acetaminophen (PERCOCET) 5-325 MG per tablet 1 tablet (1 tablet Oral Given 4/10/18 1925)       Assessments & Plan (with Medical Decision Making)     I have reviewed the nursing notes.    I have reviewed the findings, diagnosis, plan and need for follow up with the patient.   patient case discussed with Dr. Sen in Emergency Room and Dr. Silva with [facility]. Dr. Silva states patient can be seen out patient tomorrow by his PA-C Todd Holter for brace and further treatment plan. Patient offered option of inpatient for pain control, but she declined since there were no beds at the [facility]. Patient was informed of risks to return sooner including saddle anesthesia, loss of bowel or bladder function, or numbness to leg. Patient states her pain was 4/10 on pain scale at time of discharge and states that the percocet did help in the office. Patient's  drove her home.     Discharge Medication List as of 4/10/2018  8:56 PM      START taking these medications    Details   oxyCODONE-acetaminophen (PERCOCET) 5-325 MG per tablet Take 1 tablet by mouth every 6 hours as needed for pain, Disp-10 tablet, R-0, Local Print             Final  diagnoses:   Closed fracture of twelfth thoracic vertebra, unspecified fracture morphology, initial encounter (H)   Traumatic hematoma of buttock, initial encounter   Acute bilateral low back pain without sciatica       4/10/2018   [facility] EMERGENCY DEPARTMENT     Addis Hale PA-C  04/10/18 215

## 2018-04-10 NOTE — ED NOTES
Patient here for fall yesterday - R hip/leg pain/swelling.  Patient presents ambulatory to the urgent care.

## 2018-04-10 NOTE — ED AVS SNAPSHOT
[facility] Emergency Department    [address] [address]    Phone:  [phone]    Fax:  [phone]                                       Cris Priest   MRN: [id number]    Department:  [facility] Emergency Department   Date of Visit:  4/10/2018           After Visit Summary Signature Page     I have received my discharge instructions, and my questions have been answered. I have discussed any challenges I see with this plan with the nurse or doctor.    ..........................................................................................................................................  Patient/Patient Representative Signature      ..........................................................................................................................................  Patient Representative Print Name and Relationship to Patient    ..................................................               ................................................  Date                                            Time    ..........................................................................................................................................  Reviewed by Signature/Title    ...................................................              ..............................................  Date                                                            Time

## 2018-04-11 ENCOUNTER — MYC MEDICAL ADVICE (OUTPATIENT)
Dept: FAMILY MEDICINE | Facility: CLINIC | Age: 58
End: 2018-04-11

## 2018-04-11 NOTE — DISCHARGE INSTRUCTIONS
Ice, rest.     Do not take Tylenol #3 while taking percocet; caution advised with any narcotic can cause sedation     Ortho tomorrow. Patient to see Todd Holter PA-C ([facility]) for bracing and further treatment per Dr. Stokes.     Patient to return to Emergency Room if calf pain occurs, numbness in leg, saddle anesthesia, loss of bowel or bladder function.

## 2018-04-11 NOTE — TELEPHONE ENCOUNTER
Patient requesting pain meds- Seen in ER last night.     Patient did not schedule with [facility] as she did not want to go that far to be seen, so she has no appointment scheduled until she sees Miguel Glasgow on 4-20-18.     See My chart message and Please advise. (Dr. Glasgow is out of office until 4-17-18)     Andressa Anne RN

## 2018-04-11 NOTE — TELEPHONE ENCOUNTER
She needs to follow up with orthopedics, if her pain is very severe she should go to ER for pain management, or if she is feeling better schedule with available provider.     SHUN Skaggs CNP, covering for PCP

## 2018-04-11 NOTE — TELEPHONE ENCOUNTER
Since ER dictation stated patient could be seen today at [facility] by Todd Holter PA-C, I called [facility] to ask if patient was seen and given any pain medication?..    Message left for Dr Stokes's Nurse, Hollie at [phone] to call us back. Andressa Anne RN

## 2018-04-11 NOTE — ED NOTES
Patient discharged with spouse as patient was given narcotic medication in clinic.  Patient given CD of images to bring with to Ortho appointment tomorrow.

## 2018-04-11 NOTE — TELEPHONE ENCOUNTER
See My chart messages- Patient asked this be sent to Dr Glasgow when she returns to clinic on 4-17-18. Andressa Anne RN

## 2018-04-16 ENCOUNTER — MYC MEDICAL ADVICE (OUTPATIENT)
Dept: FAMILY MEDICINE | Facility: CLINIC | Age: 58
End: 2018-04-16
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
## 2018-04-16 NOTE — TELEPHONE ENCOUNTER
Please see [email or web address].  Requesting refill of percocet prescribed by ER visit 4-10-18.    Routing to provider.  Michaela WALKER RN

## 2018-04-17 RX ORDER — OXYCODONE AND ACETAMINOPHEN 5; 325 MG/1; MG/1
1 TABLET ORAL EVERY 6 HOURS PRN
Qty: 15 TABLET | Refills: 0 | Status: SHIPPED | OUTPATIENT
Start: 2018-04-17 | End: 2018-08-30

## 2018-04-28 ENCOUNTER — MYC MEDICAL ADVICE (OUTPATIENT)
Dept: FAMILY MEDICINE | Facility: CLINIC | Age: 58
End: 2018-04-28

## 2018-04-30 NOTE — TELEPHONE ENCOUNTER
Please see mychart.  Patient reports she is updating you after seeing Ortho.    Routing to provider.  Michalea WALKER RN

## 2018-05-11 ENCOUNTER — TELEPHONE (OUTPATIENT)
Dept: FAMILY MEDICINE | Facility: CLINIC | Age: 58
End: 2018-05-11

## 2018-05-11 NOTE — TELEPHONE ENCOUNTER
Reason for Call: Request for an order or referral:    Order or referral being requested: order for US    Date needed: as soon as possible    Has the patient been seen by the PCP for this problem? YES    Additional comments: [facility] calling stating pt contacted them to schedule an ultrasound. She said there isn't an order in there.     Luca Glasgow MD   to Cris Priest           8:15 AM   I agree that an ultrasound and drainage would likely be beneficial if the hematoma is so large.  The ultrasound would tell us more about just how big it is.     Would this location for the US be better? You could certainly do it here it that is more convenient. ([phone] to schedule)     Thanks,   Luca Glasgow MD      Phone number Patient can be reached at:  Home number on file [phone] (home)    Best Time:  any    Can we leave a detailed message on this number?  YES    Call taken on 5/11/2018 at 10:24 AM by Hollie Marinelli

## 2018-05-11 NOTE — TELEPHONE ENCOUNTER
Ok so spoke to radiology about the hematoma and there is not just a fluid collection that could be drained, it is just a bleed into the tissue so does not need draining.    This will take months for the bruising and lump to fully resolve.    Please notify patient no Ultrasound or drainage is recommended.    Thanks,  Luca Glasgow MD

## 2018-05-11 NOTE — TELEPHONE ENCOUNTER
Spoke with pt and provided this information.  She states understanding and will follow-up with her Pain Management physician.    Razia MCGINNIS RN

## 2018-05-11 NOTE — TELEPHONE ENCOUNTER
Maybe first call the radiologist to review patient's CT to see if there is a hematoma and how large the collection is if any and if larger than 2cm fluid collection this then could be drained.    It would them be a soft tissue US of the buttock/hip hematoma with potential drainage if larger than 2cm hematoma.     Thanks,  Luca Glasgow MD

## 2018-05-11 NOTE — TELEPHONE ENCOUNTER
Dr. Glasgow:    Pt is asking for an order for US.  Can you advise on which US needs to be ordered?    Razia MCGINNIS RN

## 2018-06-15 ENCOUNTER — TELEPHONE (OUTPATIENT)
Dept: FAMILY MEDICINE | Facility: CLINIC | Age: 58
End: 2018-06-15

## 2018-06-15 NOTE — TELEPHONE ENCOUNTER
6/15/2018    Attempt 1    Contacted patient in regards to scheduling VIP mammogram  Message on voicemail     Patient is also due for - Preventive Health Screening Colonoscopy    Comments:       Outreach   SB

## 2018-06-22 NOTE — TELEPHONE ENCOUNTER
6/22/2018    Attempt 3    Contacted patient in regards to scheduling VIP mammogram  Message on voicemail     Patient is also due for - Preventive Health Screening Colonoscopy    Comments:       Outreach   sb

## 2018-07-20 ENCOUNTER — MYC MEDICAL ADVICE (OUTPATIENT)
Dept: FAMILY MEDICINE | Facility: CLINIC | Age: 58
End: 2018-07-20

## 2018-07-20 DIAGNOSIS — M32.8 OTHER FORMS OF SYSTEMIC LUPUS ERYTHEMATOSUS, UNSPECIFIED ORGAN INVOLVEMENT STATUS (H): ICD-10-CM

## 2018-07-24 ENCOUNTER — MYC MEDICAL ADVICE (OUTPATIENT)
Dept: FAMILY MEDICINE | Facility: CLINIC | Age: 58
End: 2018-07-24

## 2018-08-24 ENCOUNTER — MYC MEDICAL ADVICE (OUTPATIENT)
Dept: FAMILY MEDICINE | Facility: CLINIC | Age: 58
End: 2018-08-24

## 2018-08-24 DIAGNOSIS — F41.1 GENERALIZED ANXIETY DISORDER: Primary | ICD-10-CM

## 2018-08-24 RX ORDER — CLONAZEPAM 0.5 MG/1
0.25 TABLET ORAL 3 TIMES DAILY PRN
Qty: 10 TABLET | Refills: 0 | Status: SHIPPED | OUTPATIENT
Start: 2018-08-24 | End: 2018-08-30 | Stop reason: ALTCHOICE

## 2018-08-24 NOTE — TELEPHONE ENCOUNTER
Located prescription in Dr. Glasgow's box, walked it to pharmacy. Given to pharmacy Becky rasmussen. Patient notified of Rx going to pharmacy.  Sheryl PALMER RN

## 2018-08-24 NOTE — TELEPHONE ENCOUNTER
Dr. Glasgow,    Please see patient's [email or web address] message. She is requesting #10 Clonazepam to get her by until her appointment 8/30/2018.    Thanks,  Sheryl PALMER RN

## 2018-08-24 NOTE — TELEPHONE ENCOUNTER
Ok printed short refill.  Luca Glasgow MD     Acute suppurative otitis media of left ear without spontaneous rupture of tympanic membrane, recurrence not specified

## 2018-08-30 ENCOUNTER — OFFICE VISIT (OUTPATIENT)
Dept: FAMILY MEDICINE | Facility: CLINIC | Age: 58
End: 2018-08-30
Payer: COMMERCIAL

## 2018-08-30 ENCOUNTER — HOSPITAL ENCOUNTER (OUTPATIENT)
Dept: MRI IMAGING | Facility: CLINIC | Age: 58
Discharge: HOME | End: 2018-08-30
Attending: PHYSICIAN ASSISTANT | Admitting: PHYSICIAN ASSISTANT
Payer: COMMERCIAL

## 2018-08-30 VITALS
HEIGHT: 66 IN | WEIGHT: 242.8 LBS | HEART RATE: 89 BPM | DIASTOLIC BLOOD PRESSURE: 82 MMHG | BODY MASS INDEX: 39.02 KG/M2 | TEMPERATURE: 98.9 F | OXYGEN SATURATION: 93 % | SYSTOLIC BLOOD PRESSURE: 132 MMHG

## 2018-08-30 DIAGNOSIS — Z12.31 ENCOUNTER FOR SCREENING MAMMOGRAM FOR BREAST CANCER: ICD-10-CM

## 2018-08-30 DIAGNOSIS — Z12.11 SPECIAL SCREENING FOR MALIGNANT NEOPLASMS, COLON: ICD-10-CM

## 2018-08-30 DIAGNOSIS — F41.1 GENERALIZED ANXIETY DISORDER: Primary | ICD-10-CM

## 2018-08-30 DIAGNOSIS — M48.35: ICD-10-CM

## 2018-08-30 PROCEDURE — 72148 MRI LUMBAR SPINE W/O DYE: CPT

## 2018-08-30 PROCEDURE — 99213 OFFICE O/P EST LOW 20 MIN: CPT | Performed by: FAMILY MEDICINE

## 2018-08-30 RX ORDER — CLONAZEPAM 0.5 MG/1
0.25 TABLET ORAL 3 TIMES DAILY PRN
Qty: 10 TABLET | Refills: 0 | Status: CANCELLED | OUTPATIENT
Start: 2018-08-30

## 2018-08-30 RX ORDER — CLONAZEPAM 0.5 MG/1
0.25 TABLET ORAL 3 TIMES DAILY PRN
Qty: 45 TABLET | Refills: 5 | Status: SHIPPED | OUTPATIENT
Start: 2018-08-30 | End: 2019-03-01

## 2018-08-30 ASSESSMENT — PATIENT HEALTH QUESTIONNAIRE - PHQ9: 5. POOR APPETITE OR OVEREATING: SEVERAL DAYS

## 2018-08-30 ASSESSMENT — ANXIETY QUESTIONNAIRES
2. NOT BEING ABLE TO STOP OR CONTROL WORRYING: MORE THAN HALF THE DAYS
3. WORRYING TOO MUCH ABOUT DIFFERENT THINGS: SEVERAL DAYS
7. FEELING AFRAID AS IF SOMETHING AWFUL MIGHT HAPPEN: SEVERAL DAYS
5. BEING SO RESTLESS THAT IT IS HARD TO SIT STILL: SEVERAL DAYS
1. FEELING NERVOUS, ANXIOUS, OR ON EDGE: SEVERAL DAYS
IF YOU CHECKED OFF ANY PROBLEMS ON THIS QUESTIONNAIRE, HOW DIFFICULT HAVE THESE PROBLEMS MADE IT FOR YOU TO DO YOUR WORK, TAKE CARE OF THINGS AT HOME, OR GET ALONG WITH OTHER PEOPLE: SOMEWHAT DIFFICULT
GAD7 TOTAL SCORE: 7
6. BECOMING EASILY ANNOYED OR IRRITABLE: NOT AT ALL

## 2018-08-30 NOTE — PATIENT INSTRUCTIONS
Thank you for choosing [facility].  You may be receiving a survey in the mail from [employer]humi [employer]akta regarding your visit today.  Please take a few minutes to complete and return the survey to let us know how we are doing.      If you have questions or concerns, please contact us via [employer] or you can contact your care team at [phone].    Our Clinic hours are:  Monday 6:40 am  to 7:00 pm  Tuesday -Friday 6:40 am to 5:00 pm    The [place] outpatient lab hours are:  Monday - Friday 6:10 am to 4:45 pm  Saturdays 7:00 am to 11:00 am  Appointments are required, call [phone]    If you have clinical questions after hours or would like to schedule an appointment,  call the clinic at [phone].

## 2018-08-30 NOTE — LETTER
My Depression Action Plan  Name: Cris Priest   Date of Birth 1960  Date: 8/30/2018    My doctor: Luca Glasgow   My clinic: [facility]  [address] [address]  [phone]          GREEN    ZONE   Good Control    What it looks like:     Things are going generally well. You have normal up s and down s. You may even feel depressed from time to time, but bad moods usually last less than a day.   What you need to do:  1. Continue to care for yourself (see self care plan)  2. Check your depression survival kit and update it as needed  3. Follow your physician s recommendations including any medication.  4. Do not stop taking medication unless you consult with your physician first.           YELLOW         ZONE Getting Worse    What it looks like:     Depression is starting to interfere with your life.     It may be hard to get out of bed; you may be starting to isolate yourself from others.    Symptoms of depression are starting to last most all day and this has happened for several days.     You may have suicidal thoughts but they are not constant.   What you need to do:     1. Call your care team, your response to treatment will improve if you keep your care team informed of your progress. Yellow periods are signs an adjustment may need to be made.     2. Continue your self-care, even if you have to fake it!    3. Talk to someone in your support network    4. Open up your depression survival kit           RED    ZONE Medical Alert - Get Help    What it looks like:     Depression is seriously interfering with your life.     You may experience these or other symptoms: You can t get out of bed most days, can t work or engage in other necessary activities, you have trouble taking care of basic hygiene, or basic responsibilities, thoughts of suicide or death that will not go away, self-injurious behavior.     What you need to do:  1. Call your care team and  request a same-day appointment. If they are not available (weekends or after hours) call your local crisis line, emergency room or 911.            Depression Self Care Plan / Survival Kit    Self-Care for Depression  Here s the deal. Your body and mind are really not as separate as most people think.  What you do and think affects how you feel and how you feel influences what you do and think. This means if you do things that people who feel good do, it will help you feel better.  Sometimes this is all it takes.  There is also a place for medication and therapy depending on how severe your depression is, so be sure to consult with your medical provider and/ or Behavioral Health Consultant if your symptoms are worsening or not improving.     In order to better manage my stress, I will:    Exercise  Get some form of exercise, every day. This will help reduce pain and release endorphins, the  feel good  chemicals in your brain. This is almost as good as taking antidepressants!  This is not the same as joining a gym and then never going! (they count on that by the way ) It can be as simple as just going for a walk or doing some gardening, anything that will get you moving.      Hygiene   Maintain good hygiene (Get out of bed in the morning, Make your bed, Brush your teeth, Take a shower, and Get dressed like you were going to work, even if you are unemployed).  If your clothes don't fit try to get ones that do.    Diet  I will strive to eat foods that are good for me, drink plenty of water, and avoid excessive sugar, caffeine, alcohol, and other mood-altering substances.  Some foods that are helpful in depression are: complex carbohydrates, B vitamins, flaxseed, fish or fish oil, fresh fruits and vegetables.    Psychotherapy  I agree to participate in Individual Therapy (if recommended).    Medication  If prescribed medications, I agree to take them.  Missing doses can result in serious side effects.  I understand that  drinking alcohol, or other illicit drug use, may cause potential side effects.  I will not stop my medication abruptly without first discussing it with my provider.    Staying Connected With Others  I will stay in touch with my friends, family members, and my primary care provider/team.    Use your imagination  Be creative.  We all have a creative side; it doesn t matter if it s oil painting, sand castles, or mud pies! This will also kick up the endorphins.    Witness Beauty  (AKA stop and smell the roses) Take a look outside, even in mid-winter. Notice colors, textures. Watch the squirrels and birds.     Service to others  Be of service to others.  There is always someone else in need.  By helping others we can  get out of ourselves  and remember the really important things.  This also provides opportunities for practicing all the other parts of the program.    Humor  Laugh and be silly!  Adjust your TV habits for less news and crime-drama and more comedy.    Control your stress  Try breathing deep, massage therapy, biofeedback, and meditation. Find time to relax each day.     My support system    Clinic Contact:  Phone number:    Contact 1:  Phone number:    Contact 2:  Phone number:    [facility]/:  Phone number:    Therapist:  Phone number:    Local crisis center:    Phone number:    Other community support:  Phone number:

## 2018-08-30 NOTE — PROGRESS NOTES
"  SUBJECTIVE:   Cris Priest is a 58 year old female who presents to clinic today for the following health issues:        Anxiety Follow-Up    Status since last visit: Worsened     Other associated symptoms:None    Complicating factors:   Significant life event: Yes-  Pain due to a fall in April, father-in-law just passed away   Current substance abuse: None  Depression symptoms: No  EUGENIE-7 SCORE 10/14/2016 2/2/2017 2/9/2018   Total Score - - -   Total Score 3 1 2     Prescribed Klonipin uses a 1/2 tablet (0.25mg) up to 3 times daily for anxiety #45 will last one month as the contract states.  Has seen Dr. Moore Psychiatry for this and if things change patient very willing to see again.    EUGENIE-7    Problem list and histories reviewed & adjusted, as indicated.  Additional history: as documented    BP Readings from Last 3 Encounters:   08/30/18 132/82   04/10/18 145/87   02/09/18 148/86    Wt Readings from Last 3 Encounters:   08/30/18 242 lb 12.8 oz (110.1 kg)   02/09/18 255 lb (115.7 kg)   11/09/17 252 lb 12.8 oz (114.7 kg)                Reviewed and updated as needed this visit by clinical staff  Tobacco  Allergies  Meds  Med Hx  Surg Hx  Fam Hx  Soc Hx      Reviewed and updated as needed this visit by Provider         ROS:  Constitutional, HEENT, cardiovascular, pulmonary, gi and gu systems are negative, except as otherwise noted.    OBJECTIVE:     /82  Pulse 89  Temp 98.9  F (37.2  C) (Tympanic)  Ht 5' 6\" (1.676 m)  Wt 242 lb 12.8 oz (110.1 kg)  SpO2 93%  BMI 39.19 kg/m2  Body mass index is 39.19 kg/(m^2).  GENERAL: healthy, alert and no distress  PSYCH: mentation appears normal, affect normal/bright    Diagnostic Test Results:  none     ASSESSMENT/PLAN:       Cris was seen today for refill request and health maintenance.    Diagnoses and all orders for this visit:    GENERALIZED ANXIETY DIS: stable, refill for 6 months.  -     clonazePAM (KLONOPIN) 0.5 MG tablet; Take 0.5 tablets (0.25 mg) " by mouth 3 times daily as needed for anxiety .  45 tablets is a 30 day supply.    Encounter for screening mammogram for breast cancer  -     *MA Screening Digital Bilateral; Future    Special screening for malignant neoplasms, colon          Luca Glasgow MD  [facility]

## 2018-08-30 NOTE — MR AVS SNAPSHOT
After Visit Summary   8/30/2018    Cris Priest    MRN: [id number]           Patient Information     Date Of Birth          1960        Visit Information        Provider Department      8/30/2018 2:20 PM Luca Glasgow MD [facility]        Today's Diagnoses     GENERALIZED ANXIETY DIS    -  1    Encounter for screening mammogram for breast cancer        Special screening for malignant neoplasms, colon          Care Instructions          Thank you for choosing [facility].  You may be receiving a survey in the mail from [facility] regarding your visit today.  Please take a few minutes to complete and return the survey to let us know how we are doing.      If you have questions or concerns, please contact us via [employer] or you can contact your care team at [phone].    Our Clinic hours are:  Monday 6:40 am  to 7:00 pm  Tuesday -Friday 6:40 am to 5:00 pm    The [place] outpatient lab hours are:  Monday - Friday 6:10 am to 4:45 pm  Saturdays 7:00 am to 11:00 am  Appointments are required, call [phone]    If you have clinical questions after hours or would like to schedule an appointment,  call the clinic at [phone].          Follow-ups after your visit        Future tests that were ordered for you today     Open Future Orders        Priority Expected Expires Ordered    *MA Screening Digital Bilateral Routine  8/30/2019 8/30/2018            Who to contact     If you have questions or need follow up information about today's clinic visit or your schedule please contact [facility] directly at [phone].  Normal or non-critical lab and imaging results will be communicated to you by MyChart, letter or phone within 4 business days after the clinic has received the results. If you do not hear from us within 7 days, please contact the clinic through [employer] or phone. If you have a critical or abnormal lab result, we will notify you by phone as soon as  "possible.  Submit refill requests through [employer] or call your pharmacy and they will forward the refill request to us. Please allow 3 business days for your refill to be completed.          Additional Information About Your Visit        Next Caller Information     [employer] gives you secure access to your electronic health record. If you see a primary care provider, you can also send messages to your care team and make appointments. If you have questions, please call your primary care clinic.  If you do not have a primary care provider, please call [phone] and they will assist you.        Care EveryWhere ID     This is your Care EveryWhere ID. This could be used by other organizations to access your [place] medical records  [id number]        Your Vitals Were     Pulse Temperature Height Pulse Oximetry BMI (Body Mass Index)       89 98.9  F (37.2  C) (Tympanic) 5' 6\" (1.676 m) 93% 39.19 kg/m2        Blood Pressure from Last 3 Encounters:   08/30/18 132/82   04/10/18 145/87   02/09/18 148/86    Weight from Last 3 Encounters:   08/30/18 242 lb 12.8 oz (110.1 kg)   02/09/18 255 lb (115.7 kg)   11/09/17 252 lb 12.8 oz (114.7 kg)                 Today's Medication Changes          These changes are accurate as of 8/30/18  3:17 PM.  If you have any questions, ask your nurse or doctor.               These medicines have changed or have updated prescriptions.        Dose/Directions    clonazePAM 0.5 MG tablet   Commonly known as:  klonoPIN   This may have changed:  Another medication with the same name was removed. Continue taking this medication, and follow the directions you see here.   Used for:  Generalized anxiety disorder   Changed by:  Luca Glasgow MD        Dose:  0.25 mg   Take 0.5 tablets (0.25 mg) by mouth 3 times daily as needed for anxiety .  45 tablets is a 30 day supply.   Quantity:  45 tablet   Refills:  5            Where to get your medicines      Some of these will need a paper prescription and " others can be bought over the counter.  Ask your nurse if you have questions.     Bring a paper prescription for each of these medications     clonazePAM 0.5 MG tablet                Primary Care Provider Office Phone # Fax #    Luca Glasgow [phone] [address] [address]        Equal Access to Services     SHIVAM GUAMAN : Hadii aad ku hadasho Soomaali, waaxda luqadaha, qaybta kaalmada adeegyada, waxay idiin hayaan adeeg khmargaritoarielle lavalery . So [facility] [phone].    ATENCIÓN: Si habla español, tiene a jerry disposición servicios gratuitos de asistencia lingüística. Llame al [phone].    We comply with applicable federal civil rights laws and [place] laws. We do not discriminate on the basis of race, color, national origin, age, disability, sex, sexual orientation, or gender identity.            Thank you!     Thank you for choosing [facility]  for your care. Our goal is always to provide you with excellent care. Hearing back from our patients is one way we can continue to improve our services. Please take a few minutes to complete the written survey that you may receive in the mail after your visit with us. Thank you!             Your Updated Medication List - Protect others around you: Learn how to safely use, store and throw away your medicines at www.disposemymeds.org.          This list is accurate as of 8/30/18  3:17 PM.  Always use your most recent med list.                   Brand Name Dispense Instructions for use Diagnosis    acetaminophen-codeine 300-30 MG per tablet    TYLENOL w/CODEINE No. 3    15 tablet    Take 1 tablet by mouth every 4 hours as needed for mild pain Max of 4/day.  Avoid taking before bed time    Other forms of systemic lupus erythematosus, unspecified organ involvement status (H)       albuterol 108 (90 Base) MCG/ACT inhaler    PROAIR HFA/PROVENTIL HFA/VENTOLIN HFA    1 Inhaler    Inhale 2 puffs into the lungs every 6 hours as  needed for shortness of breath / dyspnea or wheezing    Acute bronchitis with symptoms > 10 days       amitriptyline 75 MG tablet    ELAVIL    90 tablet    Take 1 tablet (75 mg) by mouth At Bedtime    EUGENIE (generalized anxiety disorder)       azaTHIOprine 50 MG tablet    IMURAN    270 tablet    Take 1 tablet (50 mg) by mouth 3 times daily    Systemic lupus erythematosus, unspecified SLE type, unspecified organ involvement status (H)       calcium carbonate 500 mg {elemental} 500 MG tablet    OS-DEJUAN     1 TABLET ORALLY 3 TIMES DAILY        clonazePAM 0.5 MG tablet    klonoPIN    45 tablet    Take 0.5 tablets (0.25 mg) by mouth 3 times daily as needed for anxiety .  45 tablets is a 30 day supply.    Generalized anxiety disorder       conjugated estrogens cream    PREMARIN    12 g    Place 0.5 g vaginally twice a week    Vaginal atrophy, Postmenopausal status       cyclobenzaprine 10 MG tablet    FLEXERIL    60 tablet    Take 1 tablet (10 mg) by mouth 2 times daily as needed for muscle spasms Start at night only, and increase as tolerated to daytime use, up to three times a day.  Take regularly at least 1-2 weeks, for any pain flair.    Muscle spasm       hydroxychloroquine 200 MG tablet    PLAQUENIL    270 tablet    Take 3 tablets (600 mg) by mouth daily    Systemic lupus erythematosus, unspecified SLE type, unspecified organ involvement status (H)       hydrOXYzine 25 MG tablet    ATARAX    60 tablet    Take 1-2 tablets (25-50 mg) by mouth every 6 hours as needed for itching or anxiety    Primary osteoarthritis of left knee       * predniSONE 1 MG tablet    DELTASONE    90 tablet    Take 1 tablet (1 mg) by mouth daily    Systemic lupus erythematosus, unspecified SLE type, unspecified organ involvement status (H)       * predniSONE 10 MG tablet    DELTASONE    40 tablet    Use for flare 20mg, then 10mg, then 5mg, then 1mg    Systemic lupus erythematosus, unspecified SLE type, unspecified organ involvement status (H)        sertraline 100 MG tablet    ZOLOFT    180 tablet    Take 2 tablets (200 mg) by mouth daily    Recurrent major depressive disorder, in partial remission (H)       VITAMIN D PO      Take 5,000 Units by mouth every evening        * Notice:  This list has 2 medication(s) that are the same as other medications prescribed for you. Read the directions carefully, and ask your doctor or other care provider to review them with you.

## 2018-08-31 ASSESSMENT — ANXIETY QUESTIONNAIRES: GAD7 TOTAL SCORE: 7

## 2018-08-31 ASSESSMENT — PATIENT HEALTH QUESTIONNAIRE - PHQ9: SUM OF ALL RESPONSES TO PHQ QUESTIONS 1-9: 0

## 2018-09-25 ENCOUNTER — TELEPHONE (OUTPATIENT)
Dept: FAMILY MEDICINE | Facility: CLINIC | Age: 58
End: 2018-09-25

## 2018-09-25 ENCOUNTER — MYC MEDICAL ADVICE (OUTPATIENT)
Dept: FAMILY MEDICINE | Facility: CLINIC | Age: 58
End: 2018-09-25

## 2018-09-26 NOTE — TELEPHONE ENCOUNTER
FMLA form received and started for Ahsan, patient's spouse to care for patient.  I sent patient a My Chart message to clarify needs of FMLA form.  Will await response prior to routing to Dr. Glasgow for signature.

## 2018-10-04 NOTE — TELEPHONE ENCOUNTER
Received form back from [employer] indicating please initial and date all corrections.  We did not make any corrections on form so I returned form to [employer] asking for more clarification of needs.

## 2019-03-01 ENCOUNTER — MYC MEDICAL ADVICE (OUTPATIENT)
Dept: FAMILY MEDICINE | Facility: CLINIC | Age: 59
End: 2019-03-01

## 2019-03-01 DIAGNOSIS — F41.1 GENERALIZED ANXIETY DISORDER: ICD-10-CM

## 2019-03-01 RX ORDER — CLONAZEPAM 0.5 MG/1
0.25 TABLET ORAL 3 TIMES DAILY PRN
Qty: 45 TABLET | Refills: 1 | Status: SHIPPED | OUTPATIENT
Start: 2019-03-01 | End: 2019-05-01

## 2019-03-01 NOTE — TELEPHONE ENCOUNTER
Left a non detailed message to patient to call back. To find out where to fax the Rx  Kelly Gamez on 3/1/2019 at 1:08 PM

## 2019-03-01 NOTE — TELEPHONE ENCOUNTER
Patient called back and would like it faxed to [facility]. Faxed done.    Kelly Gamez on 3/1/2019 at 1:39 PM

## 2019-03-01 NOTE — TELEPHONE ENCOUNTER
I did print refill for 2 months.  Follow up in clinic end of April.  Please ask patient to which pharmacy should it be faxed?    Thanks,  Luca Glasgow MD

## 2019-03-21 DIAGNOSIS — F41.1 GAD (GENERALIZED ANXIETY DISORDER): ICD-10-CM

## 2019-03-21 DIAGNOSIS — F33.41 RECURRENT MAJOR DEPRESSIVE DISORDER, IN PARTIAL REMISSION (H): ICD-10-CM

## 2019-03-21 NOTE — TELEPHONE ENCOUNTER
"Requested Prescriptions   Pending Prescriptions Disp Refills     amitriptyline (ELAVIL) 75 MG tablet  Last Written Prescription Date:  2/9/2018  Last Fill Quantity: 90,  # refills: 3   Last office visit: 8/30/2018 with prescribing provider:  Myah    Future Office Visit:     90 tablet 3     Sig: Take 1 tablet (75 mg) by mouth At Bedtime    Tricyclic Agents ( Annual appt and no PHQ9) Passed - 3/21/2019 12:05 PM       Passed - Blood Pressure under 140/90 in past 12 mos    BP Readings from Last 3 Encounters:   08/30/18 132/82   04/10/18 145/87   02/09/18 148/86                Passed - Recent (12 mo) or future (30 days) visit within authorizing provider's specialty    Patient had office visit in the last 12 months or has a visit in the next 30 days with authorizing provider or within the authorizing provider's specialty.  See \"Patient Info\" tab in inbasket, or \"Choose Columns\" in Meds & Orders section of the refill encounter.             Passed - Medication is active on med list       Passed - Patient is age 18 or older       Passed - Patient is not pregnant       Passed - No positive pregnancy test on record in past 12 mos        sertraline (ZOLOFT) 100 MG tablet  Last Written Prescription Date:  2/9/2018  Last Fill Quantity: 180,  # refills: 3   Last office visit: 8/30/2018 with prescribing provider:  Myah  Future Office Visit:     180 tablet 3     Sig: Take 2 tablets (200 mg) by mouth daily    SSRIs Protocol Failed - 3/21/2019 12:05 PM  EUGENIE-7 SCORE 2/2/2017 2/9/2018 8/30/2018   Total Score - - -   Total Score 1 2 7            Failed - PHQ-9 score less than 5 in past 6 months    Please review last PHQ-9 score.   PHQ-9 SCORE 2/2/2017 2/9/2018 8/30/2018   PHQ-9 Total Score - - -   PHQ-9 Total Score 1 1 0            Failed - Recent (6 mo) or future (30 days) visit within the authorizing provider's specialty    Patient had office visit in the last 6 months or has a visit in the next 30 days with authorizing provider or " "within the authorizing provider's specialty.  See \"Patient Info\" tab in inbasket, or \"Choose Columns\" in Meds & Orders section of the refill encounter.           Passed - Medication is active on med list       Passed - Patient is age 18 or older       Passed - No active pregnancy on record       Passed - No positive pregnancy test in last 12 months          "

## 2019-03-21 NOTE — TELEPHONE ENCOUNTER
Routed to provider for signature due to pop up warning between sertraline and amiptriptylin.    I have sent a reminder letter to pt; due for office visit.  Luann Swann RN

## 2019-03-21 NOTE — TELEPHONE ENCOUNTER
Pt last seen for anxiety and depression with med refills 2/9/18.  Pt was seen for an anxiety flare 8/2018 after her father-in-law had passed away.    Medication is being filled for 1 time refill only due to:  Patient needs to be seen because it has been more than one year since last visit.     Luann Swann RN

## 2019-03-21 NOTE — TELEPHONE ENCOUNTER
Thank you,  Selena Sanabria - Pharmacy Technician  [facility]  [phone]

## 2019-03-21 NOTE — LETTER
March 21, 2019      Cris Priest  [address] [place] [address]        Dear Cris,     Your sertraline and amitriptyline are being refilled for one month because you are due for an office visit with your provider.    Please call [phone] to arrange your appointment.    Thank you.    Sincerely,        Luca Glasgow MD/ Luann Swann RN        lar

## 2019-03-22 RX ORDER — SERTRALINE HYDROCHLORIDE 100 MG/1
200 TABLET, FILM COATED ORAL DAILY
Qty: 60 TABLET | Refills: 0 | Status: SHIPPED | OUTPATIENT
Start: 2019-03-22 | End: 2019-05-30

## 2019-03-22 RX ORDER — AMITRIPTYLINE HYDROCHLORIDE 75 MG/1
75 TABLET ORAL AT BEDTIME
Qty: 30 TABLET | Refills: 0 | Status: SHIPPED | OUTPATIENT
Start: 2019-03-22 | End: 2019-05-30

## 2019-04-16 ENCOUNTER — HOSPITAL ENCOUNTER (OUTPATIENT)
Dept: MRI IMAGING | Facility: CLINIC | Age: 59
Discharge: HOME | End: 2019-04-16
Attending: ANESTHESIOLOGY | Admitting: ANESTHESIOLOGY
Payer: COMMERCIAL

## 2019-04-16 DIAGNOSIS — M54.17 RADICULOPATHY, LUMBOSACRAL REGION: ICD-10-CM

## 2019-04-16 PROCEDURE — 72148 MRI LUMBAR SPINE W/O DYE: CPT

## 2019-04-17 NOTE — NURSING NOTE
The following medication was given:     MEDICATION: Kenalog 40  ROUTE: Intra-articular  SITE: RIGHT AND LEFT HIPS  DOSE: 1 ML EACH  LOT #: [id number]  :  [employer]  EXPIRATION DATE:  10/2018  NDC#: 2873-6525-72  Drawn by Harika Warner LPN  / Administered by Dr. Gilliland/ Documented by Harika Warner LPN    The following medication was given:     MEDICATION: 1% lidocaine  ROUTE: Intra-articular (given with steroid)  SITE: RIGHT AND LEFT HIPS  DOSE: 2ML EACH  LOT: [id number]  :  [employer]  EXPIRATION DATE:  11/2018  NDC#: 16449-306-00  Drawn by Harika Warner LPN  / Administered by Dr. Gilliland/ Documented by Harika Warner LPN                   Responsibility to family and others/Future oriented/Supportive social network or family/Engaged in work or school

## 2019-05-01 ENCOUNTER — MYC MEDICAL ADVICE (OUTPATIENT)
Dept: FAMILY MEDICINE | Facility: CLINIC | Age: 59
End: 2019-05-01

## 2019-05-01 DIAGNOSIS — F41.1 GENERALIZED ANXIETY DISORDER: ICD-10-CM

## 2019-05-01 RX ORDER — CLONAZEPAM 0.5 MG/1
0.25 TABLET ORAL 3 TIMES DAILY PRN
Qty: 45 TABLET | Refills: 0 | Status: SHIPPED | OUTPATIENT
Start: 2019-05-01 | End: 2019-06-11

## 2019-05-01 NOTE — TELEPHONE ENCOUNTER
Please see mychart regarding clonazepam refill prior to scheduled appt 5-7-19.  She is having a spinal block day before.    Med (RX set for 45 tablets) and pharm ready.    Routing to provider.  Michaela WALKER RN

## 2019-05-28 DIAGNOSIS — F33.41 RECURRENT MAJOR DEPRESSIVE DISORDER, IN PARTIAL REMISSION (H): ICD-10-CM

## 2019-05-28 DIAGNOSIS — F41.1 GAD (GENERALIZED ANXIETY DISORDER): ICD-10-CM

## 2019-05-28 DIAGNOSIS — M32.9 SYSTEMIC LUPUS ERYTHEMATOSUS, UNSPECIFIED SLE TYPE, UNSPECIFIED ORGAN INVOLVEMENT STATUS (H): ICD-10-CM

## 2019-05-28 NOTE — TELEPHONE ENCOUNTER
"Requested Prescriptions   Pending Prescriptions Disp Refills     hydroxychloroquine (PLAQUENIL) 200 MG tablet 270 tablet 3     Sig: Take 3 tablets (600 mg) by mouth daily   Last Written Prescription Date:  2/9/18  Last Fill Quantity: 270 tab,  # refills: 3   Last office visit: 8/30/2018 with prescribing provider:  Luca Glasgow Office Visit:        There is no refill protocol information for this order        azaTHIOprine (IMURAN) 50 MG tablet 270 tablet 3     Sig: Take 1 tablet (50 mg) by mouth 3 times daily   Last Written Prescription Date:  2/9/18  Last Fill Quantity: 270 tab,  # refills: 3   Last office visit: 8/30/2018 with prescribing provider:  Luca Glasgow Office Visit:        There is no refill protocol information for this order        amitriptyline (ELAVIL) 75 MG tablet 30 tablet 0     Sig: Take 1 tablet (75 mg) by mouth At Bedtime   Last Written Prescription Date:  3/22/19  Last Fill Quantity: 30 tab,  # refills: 0   Last office visit: 8/30/2018 with prescribing provider:  Luca Glasgow Office Visit:        Tricyclic Agents ( Annual appt and no PHQ9) Passed - 5/28/2019  1:25 PM        Passed - Blood Pressure under 140/90 in past 12 mos     BP Readings from Last 3 Encounters:   08/30/18 132/82   04/10/18 145/87   02/09/18 148/86                 Passed - Recent (12 mo) or future (30 days) visit within authorizing provider's specialty     Patient had office visit in the last 12 months or has a visit in the next 30 days with authorizing provider or within the authorizing provider's specialty.  See \"Patient Info\" tab in inbasket, or \"Choose Columns\" in Meds & Orders section of the refill encounter.              Passed - Medication is active on med list        Passed - Patient is age 18 or older        Passed - Patient is not pregnant        Passed - No positive pregnancy test on record in past 12 mos        sertraline (ZOLOFT) 100 MG tablet 60 tablet 0     " "Sig: Take 2 tablets (200 mg) by mouth daily   Last Written Prescription Date:  3/22/19  Last Fill Quantity: 60 tab,  # refills: 0   Last office visit: 8/30/2018 with prescribing provider:  Luca Glasgow     Future Office Visit:        SSRIs Protocol Failed - 5/28/2019  1:25 PM        Failed - PHQ-9 score less than 5 in past 6 months     Please review last PHQ-9 score.           Failed - Recent (6 mo) or future (30 days) visit within the authorizing provider's specialty     Patient had office visit in the last 6 months or has a visit in the next 30 days with authorizing provider or within the authorizing provider's specialty.  See \"Patient Info\" tab in inbasket, or \"Choose Columns\" in Meds & Orders section of the refill encounter.            Passed - Medication is active on med list        Passed - Patient is age 18 or older        Passed - No active pregnancy on record        Passed - No positive pregnancy test in last 12 months          "

## 2019-05-30 DIAGNOSIS — J20.9 ACUTE BRONCHITIS WITH SYMPTOMS > 10 DAYS: ICD-10-CM

## 2019-05-30 RX ORDER — ALBUTEROL SULFATE 90 UG/1
2 AEROSOL, METERED RESPIRATORY (INHALATION) EVERY 6 HOURS PRN
Qty: 18 G | Refills: 0 | Status: SHIPPED | OUTPATIENT
Start: 2019-05-30 | End: 2019-06-11

## 2019-05-30 NOTE — TELEPHONE ENCOUNTER
Routing refill request to provider for review/approval because:  Drug not on the [facility] refill protocol   Drug interaction warning      Drug-Drug Interaction Report    Sertraline / Tricyclic Antidepressants     Significance: Major     Warning: Plasma concentrations and pharmacologic effects of amitriptyline may be increased by sertraline.    Onset: Delayed    Documentation Level: Probable    Interacting Medications/Orders:  Sertraline  Oral or Non-Oral, Systemic Tricyclic Antidepressants  Oral or Non-Oral, Systemic   1. sertraline    Order: sertraline (ZOLOFT) 100 MG tablet Route: Oral  Start: 05/30/2019 End: none Frequency: DAILY    Order ([id number]): sertraline (ZOLOFT) 100 MG tablet Route: Oral  Start: 03/22/2019 End: none Frequency: DAILY 1. amitriptyline    Order: amitriptyline (ELAVIL) 75 MG tablet Route: Oral  Start: 05/30/2019 End: none Frequency: AT BEDTIME    Order ([id number]): amitriptyline (ELAVIL) 75 MG tablet Route: Oral  Start: 03/22/2019 End: none Frequency: AT BEDTIME     Management Code: Professional review suggested    Effects: sertraline may increase plasma concentrations and pharmacologic effects of amitriptyline.     AMY KeeN, RN

## 2019-05-30 NOTE — TELEPHONE ENCOUNTER
"Requested Prescriptions   Pending Prescriptions Disp Refills     albuterol (PROAIR HFA/PROVENTIL HFA/VENTOLIN HFA) 108 (90 Base) MCG/ACT inhaler       Sig: Inhale 2 puffs into the lungs every 6 hours as needed for shortness of breath / dyspnea or wheezing       Asthma Maintenance Inhalers - Anticholinergics Passed - 5/30/2019 10:08 AM        Passed - Patient is age 12 years or older        Passed - Recent (12 mo) or future (30 days) visit within the authorizing provider's specialty     Patient had office visit in the last 12 months or has a visit in the next 30 days with authorizing provider or within the authorizing provider's specialty.  See \"Patient Info\" tab in inbasket, or \"Choose Columns\" in Meds & Orders section of the refill encounter.              Passed - Medication is active on med list        Last Written Prescription Date:  11/9/17  Last Fill Quantity: 1,  # refills: 11   Last office visit: 2/9/2018 with prescribing provider:  Myah   Future Office Visit:      "

## 2019-05-30 NOTE — TELEPHONE ENCOUNTER
Pt takes albuterol for acute bronchitis in past.  Last office visit to address general medical concerns 2/9/18.    Medication is being filled for 1 time refill only due to:  Patient needs to be seen because pt is due for office visit.     Attempted to reach pt but no answer.    Reminder letter sent to pt.    Luann Swann RN

## 2019-05-30 NOTE — LETTER
May 30, 2019      Cris Priest  [address] [place] [address]        Dear Cris,     Your albuterol  Inhaler medication is being filled for 1 time refill only due to:  Patient needs to be seen because it has been more than one year since last visit. to address general medical concerns including medication refills.    Please call [phone] to make your office visit appointment.    Thank you.    Sincerely,        Luca Glasgow MD/ eriberto wei

## 2019-05-31 RX ORDER — HYDROXYCHLOROQUINE SULFATE 200 MG/1
600 TABLET, FILM COATED ORAL DAILY
Qty: 90 TABLET | Refills: 0 | Status: SHIPPED | OUTPATIENT
Start: 2019-05-31 | End: 2019-06-11

## 2019-05-31 RX ORDER — AZATHIOPRINE 50 MG/1
50 TABLET ORAL 3 TIMES DAILY
Qty: 90 TABLET | Refills: 0 | Status: SHIPPED | OUTPATIENT
Start: 2019-05-31 | End: 2019-06-11

## 2019-05-31 RX ORDER — AMITRIPTYLINE HYDROCHLORIDE 75 MG/1
75 TABLET ORAL AT BEDTIME
Qty: 30 TABLET | Refills: 0 | Status: SHIPPED | OUTPATIENT
Start: 2019-05-31 | End: 2019-06-11

## 2019-05-31 RX ORDER — SERTRALINE HYDROCHLORIDE 100 MG/1
200 TABLET, FILM COATED ORAL DAILY
Qty: 60 TABLET | Refills: 0 | Status: SHIPPED | OUTPATIENT
Start: 2019-05-31 | End: 2019-06-11

## 2019-06-11 ENCOUNTER — OFFICE VISIT (OUTPATIENT)
Dept: FAMILY MEDICINE | Facility: CLINIC | Age: 59
End: 2019-06-11
Payer: COMMERCIAL

## 2019-06-11 VITALS
BODY MASS INDEX: 42.44 KG/M2 | DIASTOLIC BLOOD PRESSURE: 82 MMHG | HEART RATE: 95 BPM | RESPIRATION RATE: 20 BRPM | HEIGHT: 67 IN | SYSTOLIC BLOOD PRESSURE: 122 MMHG | OXYGEN SATURATION: 96 % | WEIGHT: 270.4 LBS | TEMPERATURE: 99.3 F

## 2019-06-11 DIAGNOSIS — J20.9 ACUTE BRONCHITIS WITH SYMPTOMS > 10 DAYS: ICD-10-CM

## 2019-06-11 DIAGNOSIS — F41.1 GAD (GENERALIZED ANXIETY DISORDER): ICD-10-CM

## 2019-06-11 DIAGNOSIS — Z12.11 SPECIAL SCREENING FOR MALIGNANT NEOPLASMS, COLON: ICD-10-CM

## 2019-06-11 DIAGNOSIS — M32.9 SYSTEMIC LUPUS ERYTHEMATOSUS, UNSPECIFIED SLE TYPE, UNSPECIFIED ORGAN INVOLVEMENT STATUS (H): Primary | ICD-10-CM

## 2019-06-11 DIAGNOSIS — F41.1 GENERALIZED ANXIETY DISORDER: ICD-10-CM

## 2019-06-11 DIAGNOSIS — F33.41 RECURRENT MAJOR DEPRESSIVE DISORDER, IN PARTIAL REMISSION (H): ICD-10-CM

## 2019-06-11 DIAGNOSIS — M17.12 PRIMARY OSTEOARTHRITIS OF LEFT KNEE: ICD-10-CM

## 2019-06-11 LAB
ALBUMIN SERPL-MCNC: 3.1 G/DL (ref 3.4–5)
ALP SERPL-CCNC: 82 U/L (ref 40–150)
ALT SERPL W P-5'-P-CCNC: 23 U/L (ref 0–50)
ANION GAP SERPL CALCULATED.3IONS-SCNC: 4 MMOL/L (ref 3–14)
AST SERPL W P-5'-P-CCNC: 29 U/L (ref 0–45)
BASOPHILS # BLD AUTO: 0 10E9/L (ref 0–0.2)
BASOPHILS NFR BLD AUTO: 0.1 %
BILIRUB SERPL-MCNC: 0.5 MG/DL (ref 0.2–1.3)
BUN SERPL-MCNC: 10 MG/DL (ref 7–30)
CALCIUM SERPL-MCNC: 9.8 MG/DL (ref 8.5–10.1)
CHLORIDE SERPL-SCNC: 99 MMOL/L (ref 94–109)
CO2 SERPL-SCNC: 30 MMOL/L (ref 20–32)
CREAT SERPL-MCNC: 0.73 MG/DL (ref 0.52–1.04)
DIFFERENTIAL METHOD BLD: ABNORMAL
EOSINOPHIL # BLD AUTO: 0.2 10E9/L (ref 0–0.7)
EOSINOPHIL NFR BLD AUTO: 1.4 %
ERYTHROCYTE [DISTWIDTH] IN BLOOD BY AUTOMATED COUNT: 16.7 % (ref 10–15)
GFR SERPL CREATININE-BSD FRML MDRD: 89 ML/MIN/{1.73_M2}
GLUCOSE SERPL-MCNC: 114 MG/DL (ref 70–99)
HCT VFR BLD AUTO: 45.7 % (ref 35–47)
HGB BLD-MCNC: 13.8 G/DL (ref 11.7–15.7)
LYMPHOCYTES # BLD AUTO: 1.9 10E9/L (ref 0.8–5.3)
LYMPHOCYTES NFR BLD AUTO: 16 %
MCH RBC QN AUTO: 26.9 PG (ref 26.5–33)
MCHC RBC AUTO-ENTMCNC: 30.2 G/DL (ref 31.5–36.5)
MCV RBC AUTO: 89 FL (ref 78–100)
MONOCYTES # BLD AUTO: 0.6 10E9/L (ref 0–1.3)
MONOCYTES NFR BLD AUTO: 4.9 %
NEUTROPHILS # BLD AUTO: 9 10E9/L (ref 1.6–8.3)
NEUTROPHILS NFR BLD AUTO: 77.6 %
PLATELET # BLD AUTO: 299 10E9/L (ref 150–450)
POTASSIUM SERPL-SCNC: 4.4 MMOL/L (ref 3.4–5.3)
PROT SERPL-MCNC: 9.6 G/DL (ref 6.8–8.8)
RBC # BLD AUTO: 5.13 10E12/L (ref 3.8–5.2)
SODIUM SERPL-SCNC: 133 MMOL/L (ref 133–144)
WBC # BLD AUTO: 11.6 10E9/L (ref 4–11)

## 2019-06-11 PROCEDURE — 85025 COMPLETE CBC W/AUTO DIFF WBC: CPT | Performed by: FAMILY MEDICINE

## 2019-06-11 PROCEDURE — 36415 COLL VENOUS BLD VENIPUNCTURE: CPT | Performed by: FAMILY MEDICINE

## 2019-06-11 PROCEDURE — 80053 COMPREHEN METABOLIC PANEL: CPT | Performed by: FAMILY MEDICINE

## 2019-06-11 PROCEDURE — 99214 OFFICE O/P EST MOD 30 MIN: CPT | Performed by: FAMILY MEDICINE

## 2019-06-11 RX ORDER — CLONAZEPAM 0.5 MG/1
0.25 TABLET ORAL 3 TIMES DAILY PRN
Qty: 45 TABLET | Refills: 5 | Status: SHIPPED | OUTPATIENT
Start: 2019-06-11 | End: 2019-12-11

## 2019-06-11 RX ORDER — HYDROXYZINE HYDROCHLORIDE 25 MG/1
25-50 TABLET, FILM COATED ORAL EVERY 6 HOURS PRN
Qty: 60 TABLET | Refills: 6 | Status: SHIPPED | OUTPATIENT
Start: 2019-06-11 | End: 2021-06-11

## 2019-06-11 RX ORDER — ALBUTEROL SULFATE 90 UG/1
2 AEROSOL, METERED RESPIRATORY (INHALATION) EVERY 6 HOURS PRN
Qty: 18 G | Refills: 0 | Status: SHIPPED | OUTPATIENT
Start: 2019-06-11 | End: 2019-08-26

## 2019-06-11 RX ORDER — MORPHINE SULFATE 15 MG/1
TABLET, FILM COATED, EXTENDED RELEASE ORAL
Refills: 0 | COMMUNITY
Start: 2019-05-18

## 2019-06-11 RX ORDER — HYDROXYCHLOROQUINE SULFATE 200 MG/1
600 TABLET, FILM COATED ORAL DAILY
Qty: 90 TABLET | Refills: 11 | Status: SHIPPED | OUTPATIENT
Start: 2019-06-11 | End: 2020-07-02

## 2019-06-11 RX ORDER — SERTRALINE HYDROCHLORIDE 100 MG/1
200 TABLET, FILM COATED ORAL DAILY
Qty: 60 TABLET | Refills: 11 | Status: SHIPPED | OUTPATIENT
Start: 2019-06-11 | End: 2020-07-02

## 2019-06-11 RX ORDER — AMITRIPTYLINE HYDROCHLORIDE 75 MG/1
75 TABLET ORAL AT BEDTIME
Qty: 30 TABLET | Refills: 11 | Status: SHIPPED | OUTPATIENT
Start: 2019-06-11 | End: 2020-05-28

## 2019-06-11 RX ORDER — AZATHIOPRINE 50 MG/1
50 TABLET ORAL 3 TIMES DAILY
Qty: 90 TABLET | Refills: 11 | Status: SHIPPED | OUTPATIENT
Start: 2019-06-11 | End: 2020-07-02

## 2019-06-11 ASSESSMENT — MIFFLIN-ST. JEOR: SCORE: 1824.16

## 2019-06-11 NOTE — PROGRESS NOTES
Subjective     Cris Priest is a 59 year old female who presents to clinic today for the following health issues:    HPI   Depression and Anxiety Follow-Up    How are you doing with your depression since your last visit? Stable    How are you doing with your anxiety since your last visit?  Stable    Are you having other symptoms that might be associated with depression or anxiety? No    Have you had a significant life event? No- ongoing health issues    Do you have any concerns with your use of alcohol or other drugs? No    Social History     Tobacco Use     Smoking status: Former Smoker     Packs/day: 0.50     Years: 37.00     Pack years: 18.50     Types: Other     Last attempt to quit: 11/1/2015     Years since quitting: 3.6     Smokeless tobacco: Never Used     Tobacco comment: Use Electronic Cigarette   Substance Use Topics     Alcohol use: No     Alcohol/week: 0.0 oz     Drug use: No     PHQ 2/2/2017 2/9/2018 8/30/2018   PHQ-9 Total Score 1 1 0   Q9: Thoughts of better off dead/self-harm past 2 weeks Not at all Not at all Not at all     EUGENIE-7 SCORE 2/2/2017 2/9/2018 8/30/2018   Total Score - - -   Total Score 1 2 7   PHQ9 0  EUGENIE 7: 2 today.    Suicide Assessment Five-step Evaluation and Treatment (SAFE-T)    Amount of exercise or physical activity: None    Problems taking medications regularly: No    Medication side effects: none    Diet: regular (no restrictions)      Medication Followup of Hydroxychloroquine For Lupus    Taking Medication as prescribed: yes    Side Effects:  None    Medication Helping Symptoms:  Yes    Had eye exam in the last year.       BP Readings from Last 3 Encounters:   06/11/19 122/82   08/30/18 132/82   04/10/18 145/87    Wt Readings from Last 3 Encounters:   06/11/19 122.7 kg (270 lb 6.4 oz)   08/30/18 110.1 kg (242 lb 12.8 oz)   02/09/18 115.7 kg (255 lb)                 Reviewed and updated as needed this visit by Provider         Review of Systems   ROS COMP: Constitutional,  "HEENT, cardiovascular, pulmonary, gi and gu systems are negative, except as otherwise noted.      Objective    /82   Pulse 95   Temp 99.3  F (37.4  C) (Tympanic)   Resp 20   Ht 1.702 m (5' 7\")   Wt 122.7 kg (270 lb 6.4 oz)   SpO2 96%   BMI 42.35 kg/m    Body mass index is 42.35 kg/m .  Physical Exam   GENERAL: healthy, alert and no distress  PSYCH: mentation appears normal, affect normal/bright    Diagnostic Test Results:  Labs reviewed in Epic        Assessment & Plan     Cris was seen today for blood draw and recheck medication.    Diagnoses and all orders for this visit:  Systemic lupus erythematosus, unspecified SLE type, unspecified organ involvement status (H): had followed with Rheum and was stable  -labs and refills today  -if not well controlled, then will refer to Rheum again.  -     Comprehensive metabolic panel  -     CBC with platelets differential  -     hydroxychloroquine (PLAQUENIL) 200 MG tablet; Take 3 tablets (600 mg) by mouth daily  -     azaTHIOprine (IMURAN) 50 MG tablet; Take 1 tablet (50 mg) by mouth 3 times daily    Recurrent major depressive disorder, in partial remission (H): stable refill  -     sertraline (ZOLOFT) 100 MG tablet; Take 2 tablets (200 mg) by mouth daily    GENERALIZED ANXIETY DIS: stable refill  -     clonazePAM (KLONOPIN) 0.5 MG tablet; Take 0.5 tablets (0.25 mg) by mouth 3 times daily as needed for anxiety .  45 tablets is a 30 day supply.    EUGENIE (generalized anxiety disorder): stable refill  -     amitriptyline (ELAVIL) 75 MG tablet; Take 1 tablet (75 mg) by mouth At Bedtime    Acute bronchitis with symptoms > 10 days  -     albuterol (PROAIR HFA/PROVENTIL HFA/VENTOLIN HFA) 108 (90 Base) MCG/ACT inhaler; Inhale 2 puffs into the lungs every 6 hours as needed for shortness of breath / dyspnea or wheezing    Primary osteoarthritis of left knee  -     hydrOXYzine (ATARAX) 25 MG tablet; Take 1-2 tablets (25-50 mg) by mouth every 6 hours as needed for itching or " "anxiety    Special screening for malignant neoplasms, colon  -     Fecal colorectal cancer screen (FIT); Future         BMI:   Estimated body mass index is 42.35 kg/m  as calculated from the following:    Height as of this encounter: 1.702 m (5' 7\").    Weight as of this encounter: 122.7 kg (270 lb 6.4 oz).   Weight management plan: Discussed healthy diet and exercise guidelines        Patient Instructions   1. To lab          Return in about 6 months (around 12/11/2019).    Luca Glasgow MD  [facility]      "

## 2019-06-12 ASSESSMENT — ANXIETY QUESTIONNAIRES
5. BEING SO RESTLESS THAT IT IS HARD TO SIT STILL: NOT AT ALL
GAD7 TOTAL SCORE: 2
1. FEELING NERVOUS, ANXIOUS, OR ON EDGE: NOT AT ALL
7. FEELING AFRAID AS IF SOMETHING AWFUL MIGHT HAPPEN: NOT AT ALL
IF YOU CHECKED OFF ANY PROBLEMS ON THIS QUESTIONNAIRE, HOW DIFFICULT HAVE THESE PROBLEMS MADE IT FOR YOU TO DO YOUR WORK, TAKE CARE OF THINGS AT HOME, OR GET ALONG WITH OTHER PEOPLE: NOT DIFFICULT AT ALL
6. BECOMING EASILY ANNOYED OR IRRITABLE: NOT AT ALL
2. NOT BEING ABLE TO STOP OR CONTROL WORRYING: SEVERAL DAYS
3. WORRYING TOO MUCH ABOUT DIFFERENT THINGS: SEVERAL DAYS

## 2019-06-12 ASSESSMENT — PATIENT HEALTH QUESTIONNAIRE - PHQ9
SUM OF ALL RESPONSES TO PHQ QUESTIONS 1-9: 0
5. POOR APPETITE OR OVEREATING: NOT AT ALL

## 2019-06-13 ASSESSMENT — ANXIETY QUESTIONNAIRES: GAD7 TOTAL SCORE: 2

## 2019-07-25 ENCOUNTER — TELEPHONE (OUTPATIENT)
Dept: FAMILY MEDICINE | Facility: CLINIC | Age: 59
End: 2019-07-25

## 2019-07-25 DIAGNOSIS — Z12.11 SPECIAL SCREENING FOR MALIGNANT NEOPLASMS, COLON: Primary | ICD-10-CM

## 2019-07-25 NOTE — TELEPHONE ENCOUNTER
Panel Management Review      Patient has the following on her problem list:     Depression / Dysthymia review    Measure:  Needs PHQ-9 score of 4 or less during index window.  Administer PHQ-9 and if score is 5 or more, send encounter to provider for next steps.    5 - 7 month window range:     PHQ-9 SCORE 2/9/2018 8/30/2018 6/12/2019   PHQ-9 Total Score - - -   PHQ-9 Total Score 1 0 0       If PHQ-9 recheck is 5 or more, route to provider for next steps.    Patient is due for:  None    Hypertension   Last three blood pressure readings:  BP Readings from Last 3 Encounters:   06/11/19 122/82   08/30/18 132/82   04/10/18 145/87     Blood pressure: Passed    HTN Guidelines:  Less than 140/90      Composite cancer screening  Chart review shows that this patient is due/due soon for the following Mammogram and Colonoscopy  Summary:    Patient is due/failing the following:   COLONOSCOPY, LDL, MAMMOGRAM and PHYSICAL    Action needed:   Patient needs office visit for Physical. Patient needs an order/ referral for mammogram and colonoscopy.    Type of outreach:    Sent letter.    Questions for provider review:    None                                                                                                                                    Beverley Ignacio MA

## 2019-07-25 NOTE — LETTER
[facility]  [address]  [facility] [address]  Phone: [phone]  July 25, 2019      Cris Priest  [address]  [facility] [place] [address]      Dear Cris,    We care about your health and have reviewed your health plan including your medical conditions, medications, and lab results.  Based on this review, it is recommended that you follow up regarding the following health topic(s):  -Breast Cancer Screening  -Colon Cancer Screening  -Wellness (Physical) Visit     We recommend you take the following action(s):  -schedule a WELLNESS (Physical) APPOINTMENT.  We will perform the following labs: Lipids (fasting cholesterol - nothing to eat except water and/or meds for 8-10 hours).  -schedule a MAMMOGRAM which is due. Please disregard this reminder if you have had this exam elsewhere within the last 1-2 years please let us know so we can update your records.  -schedule a COLONOSCOPY to look for colon cancer (due every 10 years or 5 years in higher risk situations.)  Colonoscopies can prevent 90-95% of colon cancer deaths.  Problem lesions can be removed before they ever become cancer.  If you do not wish to do a colonoscopy or cannot afford to do one at this time, there is another option called a Fecal Immunochemical Occult Blood Test (FIT) a take home stool sample kit.  It does not replace the colonoscopy for colorectal cancer screening, but it can detect hidden bleeding in the lower colon.  It does need to be repeated every year and if a positive result is obtained, you would be referred for a colonoscopy.  If you have completed either one of these tests at another facility, please have the records sent to our clinic for our records.     Please call us at the [facility] [phone] (or use [employer]) to address the above recommendations.     Thank you for trusting [facility] and we appreciate the opportunity to serve you.  We look forward to  supporting your healthcare needs in the future.    Healthy Regards,    Your Health Care Team  [facility]

## 2019-07-31 NOTE — TELEPHONE ENCOUNTER
Pt just had a radio frequency and will need to wait to have mammo, colonoscopy and bone scan. She will call when she is ready. New orders will need to be placed.

## 2019-07-31 NOTE — TELEPHONE ENCOUNTER
Attempt #2  Left message for patient to call clinic back. When patient calls back please inform patient that she is due for a physical. She is also due for a mammogram and colonoscopy. Patient does have a current order for the mammogram but expires on 8/30/2019. Patient can call [phone] to schedule the mammogram. Patient does not have a current order for a colonoscopy. We can put an order in and help her get scheduled.  Beverley Ignacio MA

## 2019-08-14 NOTE — TELEPHONE ENCOUNTER
Patient called back and had radio frequency done recently. She is going to wait for awhile to get her colonoscopy and mammogram done. New coloscopy order is in. Patient stated will call and schedule when ready.    Beverley Ignacio MA

## 2019-08-24 DIAGNOSIS — J20.9 ACUTE BRONCHITIS WITH SYMPTOMS > 10 DAYS: ICD-10-CM

## 2019-08-26 RX ORDER — ALBUTEROL SULFATE 90 UG/1
2 AEROSOL, METERED RESPIRATORY (INHALATION) EVERY 6 HOURS PRN
Qty: 18 G | Refills: 0 | Status: SHIPPED | OUTPATIENT
Start: 2019-08-26 | End: 2019-12-20

## 2019-08-26 NOTE — TELEPHONE ENCOUNTER
"S:  Refill request for ventolin    B:  LOV 6/11/19  Albuterol inhaler last written 6/11/19    A:  Requested Prescriptions   Pending Prescriptions Disp Refills     albuterol (PROAIR HFA/PROVENTIL HFA/VENTOLIN HFA) 108 (90 Base) MCG/ACT inhaler 18 g 0     Sig: Inhale 2 puffs into the lungs every 6 hours as needed for shortness of breath / dyspnea or wheezing       Asthma Maintenance Inhalers - Anticholinergics Passed - 8/26/2019  3:59 PM        Passed - Patient is age 12 years or older        Passed - Recent (12 mo) or future (30 days) visit within the authorizing provider's specialty     Patient had office visit in the last 12 months or has a visit in the next 30 days with authorizing provider or within the authorizing provider's specialty.  See \"Patient Info\" tab in inbasket, or \"Choose Columns\" in Meds & Orders section of the refill encounter.              Passed - Medication is active on med list        Passes FM refill protocol    R:  Filled per G refill protocol.    No further action necessary.  Encounter closed.    Adams Soto RN          "

## 2019-10-13 ENCOUNTER — TELEPHONE (OUTPATIENT)
Dept: FAMILY MEDICINE | Facility: CLINIC | Age: 59
End: 2019-10-13

## 2019-10-13 DIAGNOSIS — J20.9 ACUTE BRONCHITIS WITH SYMPTOMS > 10 DAYS: ICD-10-CM

## 2019-10-13 RX ORDER — ALBUTEROL SULFATE 90 UG/1
2 AEROSOL, METERED RESPIRATORY (INHALATION) EVERY 6 HOURS PRN
Qty: 18 G | Refills: 0 | Status: CANCELLED | OUTPATIENT
Start: 2019-10-13

## 2019-10-13 NOTE — TELEPHONE ENCOUNTER
"Requested Prescriptions   Pending Prescriptions Disp Refills     albuterol (PROAIR HFA/PROVENTIL HFA/VENTOLIN HFA) 108 (90 Base) MCG/ACT inhaler  Last Written Prescription Date:  08/26/19  Last Fill Quantity: 18,  # refills: 0   Last office visit: 6/11/2019 with prescribing provider:   AGNES Glasgow  Future Office Visit:     18 g 0     Sig: Inhale 2 puffs into the lungs every 6 hours as needed for shortness of breath / dyspnea or wheezing       Asthma Maintenance Inhalers - Anticholinergics Passed - 10/13/2019  3:14 PM        Passed - Patient is age 12 years or older        Passed - Recent (12 mo) or future (30 days) visit within the authorizing provider's specialty     Patient has had an office visit with the authorizing provider or a provider within the authorizing providers department within the previous 12 mos or has a future within next 30 days. See \"Patient Info\" tab in inbasket, or \"Choose Columns\" in Meds & Orders section of the refill encounter.              Passed - Medication is active on med list          "

## 2019-10-16 NOTE — TELEPHONE ENCOUNTER
Left message for patient to return call to clinic.  RX prescribed for bronchitis symptoms back in May.  If she is experiencing continued symptoms then she needs an OV.  Please help patient schedule appt.    Michaela WALKER RN

## 2019-10-21 NOTE — TELEPHONE ENCOUNTER
Tried to call her, it picks up and is a loud scratchy noise, no voicemail, unable to leave a message.   Fabienne Ewing RNC

## 2019-10-22 NOTE — TELEPHONE ENCOUNTER
I have attempted to call the patient and get the static again.  I did try calling her spouse's home number listed and got a busy signal. Ana BIRMINGHAM RN

## 2019-10-23 RX ORDER — ALBUTEROL SULFATE 90 UG/1
2 AEROSOL, METERED RESPIRATORY (INHALATION) EVERY 6 HOURS PRN
Qty: 18 G | Refills: 0 | OUTPATIENT
Start: 2019-10-23

## 2019-10-23 NOTE — TELEPHONE ENCOUNTER
We have attempted for days to contact the patient.  Let the pharmacy know to have patient contact us. Ana BIRMINGHAM RN

## 2019-10-28 ENCOUNTER — MYC MEDICAL ADVICE (OUTPATIENT)
Dept: FAMILY MEDICINE | Facility: CLINIC | Age: 59
End: 2019-10-28

## 2019-10-29 ENCOUNTER — MYC MEDICAL ADVICE (OUTPATIENT)
Dept: FAMILY MEDICINE | Facility: CLINIC | Age: 59
End: 2019-10-29

## 2019-11-02 NOTE — TELEPHONE ENCOUNTER
Form completed, signed, and faxed to [facility] at [phone] and patient notified of status via My Chart.

## 2019-11-03 ENCOUNTER — HEALTH MAINTENANCE LETTER (OUTPATIENT)
Age: 59
End: 2019-11-03

## 2019-11-05 NOTE — TELEPHONE ENCOUNTER
11/2/2019  Form completed, signed, and faxed to [facility] at [phone] and patient notified of status via My Chart.

## 2019-12-11 ENCOUNTER — MYC MEDICAL ADVICE (OUTPATIENT)
Dept: FAMILY MEDICINE | Facility: CLINIC | Age: 59
End: 2019-12-11

## 2019-12-11 DIAGNOSIS — F41.1 GENERALIZED ANXIETY DISORDER: ICD-10-CM

## 2019-12-11 RX ORDER — CLONAZEPAM 0.5 MG/1
0.25 TABLET ORAL 3 TIMES DAILY PRN
Qty: 30 TABLET | Refills: 0 | Status: SHIPPED | OUTPATIENT
Start: 2019-12-11 | End: 2020-01-08

## 2019-12-11 NOTE — TELEPHONE ENCOUNTER
See MyChart. Patient is asking for 2 week supply of clonazepam. Patient typically gets 45 for a month supply.      AMY KeeN, RN

## 2019-12-13 ENCOUNTER — MYC MEDICAL ADVICE (OUTPATIENT)
Dept: FAMILY MEDICINE | Facility: CLINIC | Age: 59
End: 2019-12-13

## 2019-12-13 NOTE — TELEPHONE ENCOUNTER
Contacted [facility] spoke with Tiffany, Clonazepam is at pharmacy ready to be picked up. Informed patient through [email or web address].

## 2019-12-20 ENCOUNTER — MYC REFILL (OUTPATIENT)
Dept: FAMILY MEDICINE | Facility: CLINIC | Age: 59
End: 2019-12-20

## 2019-12-20 DIAGNOSIS — J20.9 ACUTE BRONCHITIS WITH SYMPTOMS > 10 DAYS: ICD-10-CM

## 2019-12-23 RX ORDER — ALBUTEROL SULFATE 90 UG/1
2 AEROSOL, METERED RESPIRATORY (INHALATION) EVERY 6 HOURS PRN
Qty: 18 G | Refills: 0 | Status: SHIPPED | OUTPATIENT
Start: 2019-12-23 | End: 2020-02-18

## 2019-12-23 NOTE — TELEPHONE ENCOUNTER
"Requested Prescriptions   Pending Prescriptions Disp Refills     albuterol (PROAIR HFA/PROVENTIL HFA/VENTOLIN HFA) 108 (90 Base) MCG/ACT inhaler 18 g 0     Sig: Inhale 2 puffs into the lungs every 6 hours as needed for shortness of breath / dyspnea or wheezing       Asthma Maintenance Inhalers - Anticholinergics Passed - 12/23/2019  1:09 PM        Passed - Patient is age 12 years or older        Passed - Recent (12 mo) or future (30 days) visit within the authorizing provider's specialty     Patient has had an office visit with the authorizing provider or a provider within the authorizing providers department within the previous 12 mos or has a future within next 30 days. See \"Patient Info\" tab in inbasket, or \"Choose Columns\" in Meds & Orders section of the refill encounter.              Passed - Medication is active on med list        Last Written Prescription Date:  8/26/19  Last Fill Quantity: 18 g,  # refills: 0   Last office visit: 6/11/2019 with prescribing provider:  Dr Glasgow    Future Office Visit:   Next 5 appointments (look out 90 days)    Dec 27, 2019 12:40 PM CST  Dequan Venegas with Luca Glasgow MD  [facility]  Arrive at: [facility] [address] [facility] [address] [phone]       one refill per protocol,   Has upcoming appt.   Fabienne Ewing RNC      "

## 2020-01-08 ENCOUNTER — TELEPHONE (OUTPATIENT)
Dept: FAMILY MEDICINE | Facility: CLINIC | Age: 60
End: 2020-01-08

## 2020-01-08 ENCOUNTER — MYC MEDICAL ADVICE (OUTPATIENT)
Dept: FAMILY MEDICINE | Facility: CLINIC | Age: 60
End: 2020-01-08

## 2020-01-08 DIAGNOSIS — F41.1 GENERALIZED ANXIETY DISORDER: ICD-10-CM

## 2020-01-08 RX ORDER — CLONAZEPAM 0.5 MG/1
0.25 TABLET ORAL 3 TIMES DAILY PRN
Qty: 30 TABLET | Refills: 0 | Status: SHIPPED | OUTPATIENT
Start: 2020-01-08 | End: 2020-02-06

## 2020-01-08 NOTE — LETTER
[facility]  [address]  [facility] [place] [address]  Phone: [phone]  January 8, 2020      Cris Priest  [address]  [facility] [place] [address]      Dear Cris,    We care about your health and have reviewed your health plan including your medical conditions, medications, and lab results.  Based on this review, it is recommended that you follow up regarding the following health topic(s):  -Depression  -Breast Cancer Screening  -Colon Cancer Screening  -Cervical Cancer Screening  -Wellness (Physical) Visit     We recommend you take the following action(s):  -schedule a FOLLOWUP APPOINTMENT.  -schedule a WELLNESS (Physical) APPOINTMENT.  We will perform the following labs: Lipids (fasting cholesterol - nothing to eat except water and/or meds for 8-10 hours).  -schedule a MAMMOGRAM which is due. Please disregard this reminder if you have had this exam elsewhere within the last 1-2 years please let us know so we can update your records.  -schedule a COLONOSCOPY to look for colon cancer (due every 10 years or 5 years in higher risk situations.)  Colonoscopies can prevent 90-95% of colon cancer deaths.  Problem lesions can be removed before they ever become cancer.  If you do not wish to do a colonoscopy or cannot afford to do one at this time, there is another option called a Fecal Immunochemical Occult Blood Test (FIT) a take home stool sample kit.  It does not replace the colonoscopy for colorectal cancer screening, but it can detect hidden bleeding in the lower colon.  It does need to be repeated every year and if a positive result is obtained, you would be referred for a colonoscopy.  If you have completed either one of these tests at another facility, please have the records sent to our clinic for our records.     Please call us at the [facility] [phone] (or use [employer]) to address the above recommendations.     Thank you for trusting [facility] and we  appreciate the opportunity to serve you.  We look forward to supporting your healthcare needs in the future.    Healthy Regards,    Your Health Care Team  [facility]

## 2020-01-08 NOTE — TELEPHONE ENCOUNTER
Panel Management Review      Patient has the following on her problem list:     Depression / Dysthymia review    Measure:  Needs PHQ-9 score of 4 or less during index window.  Administer PHQ-9 and if score is 5 or more, send encounter to provider for next steps.    5 - 7 month window range:     PHQ-9 SCORE 2/9/2018 8/30/2018 6/12/2019   PHQ-9 Total Score - - -   PHQ-9 Total Score 1 0 0       If PHQ-9 recheck is 5 or more, route to provider for next steps.    Patient is due for:  PHQ9    Hypertension   Last three blood pressure readings:  BP Readings from Last 3 Encounters:   06/11/19 122/82   08/30/18 132/82   04/10/18 145/87     Blood pressure: Passed    HTN Guidelines:  Less than 140/90      Composite cancer screening  Chart review shows that this patient is due/due soon for the following Pap Smear, Mammogram and Fecal Colorectal (FIT)  Summary:    Patient is due/failing the following:   Med recheck, FIT, LDL, MAMMOGRAM, PAP, PHQ9 and PHYSICAL    Action needed:   Patient needs office visit for a Physical with a pap smear. Patient needs to schedule a mammogram and complete fit test (given 6/2019). Patient also needs to follow up for her medications.    Type of outreach:    Sent letter.    Questions for provider review:    None                                                                                                                                    Beverley Ignacio MA

## 2020-02-04 DIAGNOSIS — F41.1 GENERALIZED ANXIETY DISORDER: ICD-10-CM

## 2020-02-04 RX ORDER — CLONAZEPAM 0.5 MG/1
0.25 TABLET ORAL 3 TIMES DAILY PRN
Qty: 30 TABLET | Refills: 0 | Status: CANCELLED | OUTPATIENT
Start: 2020-02-04

## 2020-02-05 NOTE — TELEPHONE ENCOUNTER
Requested Prescriptions   Pending Prescriptions Disp Refills     clonazePAM (KLONOPIN) 0.5 MG tablet 30 tablet 0     Sig: Take 0.5 tablets (0.25 mg) by mouth 3 times daily as needed for anxiety       There is no refill protocol information for this order        Last Written Prescription Date:  1/8/20  Last Fill Quantity: 30,  # refills: 0   Last Office Visit with [facility] or [facility] prescribing provider:  6/11/19   Future Office Visit:    Next 5 appointments (look out 90 days)    Feb 18, 2020  3:40 PM CST  Dequan Quijano with Luca Glasgow MD  [facility]  Arrive at: [facility] [address] [facility] [facility] [address] [phone]

## 2020-02-06 ENCOUNTER — MYC MEDICAL ADVICE (OUTPATIENT)
Dept: FAMILY MEDICINE | Facility: CLINIC | Age: 60
End: 2020-02-06

## 2020-02-06 DIAGNOSIS — F41.1 GENERALIZED ANXIETY DISORDER: ICD-10-CM

## 2020-02-06 RX ORDER — CLONAZEPAM 0.5 MG/1
0.25 TABLET ORAL 3 TIMES DAILY PRN
Qty: 15 TABLET | Refills: 0 | Status: SHIPPED | OUTPATIENT
Start: 2020-02-06 | End: 2020-02-18

## 2020-02-06 NOTE — TELEPHONE ENCOUNTER
Routing refill request to provider for review/approval because:  Drug not on the [facility] refill protocol       Clonazepam  Last Written Prescription Date:  1-8-20  Last Fill Quantity: 30,  # refills: 0   Last office visit: 6/11/2019 with prescribing provider:  Dr. Glasgow   Future Office Visit:   Next 5 appointments (look out 90 days)    Feb 18, 2020  3:40 PM CST  Sharrit Samm with Luca Glasgow MD  [facility]  Arrive at: Clinic [address] [facility] [address]  [phone]             Michaela WALKER RN

## 2020-02-18 ENCOUNTER — OFFICE VISIT (OUTPATIENT)
Dept: FAMILY MEDICINE | Facility: CLINIC | Age: 60
End: 2020-02-18
Payer: COMMERCIAL

## 2020-02-18 VITALS
BODY MASS INDEX: 41.82 KG/M2 | RESPIRATION RATE: 20 BRPM | DIASTOLIC BLOOD PRESSURE: 82 MMHG | SYSTOLIC BLOOD PRESSURE: 136 MMHG | WEIGHT: 267 LBS | OXYGEN SATURATION: 94 % | HEART RATE: 87 BPM | TEMPERATURE: 97.1 F

## 2020-02-18 DIAGNOSIS — J20.9 ACUTE BRONCHITIS WITH SYMPTOMS > 10 DAYS: ICD-10-CM

## 2020-02-18 DIAGNOSIS — M32.9 SYSTEMIC LUPUS ERYTHEMATOSUS, UNSPECIFIED SLE TYPE, UNSPECIFIED ORGAN INVOLVEMENT STATUS (H): ICD-10-CM

## 2020-02-18 DIAGNOSIS — F41.1 GENERALIZED ANXIETY DISORDER: Primary | ICD-10-CM

## 2020-02-18 DIAGNOSIS — E66.01 MORBID OBESITY DUE TO EXCESS CALORIES (H): ICD-10-CM

## 2020-02-18 DIAGNOSIS — F33.41 RECURRENT MAJOR DEPRESSIVE DISORDER, IN PARTIAL REMISSION (H): ICD-10-CM

## 2020-02-18 DIAGNOSIS — Z23 NEED FOR PROPHYLACTIC VACCINATION AND INOCULATION AGAINST INFLUENZA: ICD-10-CM

## 2020-02-18 LAB
ALBUMIN SERPL-MCNC: 2.8 G/DL (ref 3.4–5)
ALP SERPL-CCNC: 85 U/L (ref 40–150)
ALT SERPL W P-5'-P-CCNC: 24 U/L (ref 0–50)
ANION GAP SERPL CALCULATED.3IONS-SCNC: 2 MMOL/L (ref 3–14)
AST SERPL W P-5'-P-CCNC: 18 U/L (ref 0–45)
BASOPHILS # BLD AUTO: 0 10E9/L (ref 0–0.2)
BASOPHILS NFR BLD AUTO: 0.3 %
BILIRUB SERPL-MCNC: 0.5 MG/DL (ref 0.2–1.3)
BUN SERPL-MCNC: 10 MG/DL (ref 7–30)
CALCIUM SERPL-MCNC: 8.7 MG/DL (ref 8.5–10.1)
CHLORIDE SERPL-SCNC: 101 MMOL/L (ref 94–109)
CHOLEST SERPL-MCNC: 185 MG/DL
CO2 SERPL-SCNC: 30 MMOL/L (ref 20–32)
CREAT SERPL-MCNC: 0.67 MG/DL (ref 0.52–1.04)
DIFFERENTIAL METHOD BLD: ABNORMAL
EOSINOPHIL # BLD AUTO: 0.1 10E9/L (ref 0–0.7)
EOSINOPHIL NFR BLD AUTO: 1.1 %
ERYTHROCYTE [DISTWIDTH] IN BLOOD BY AUTOMATED COUNT: 15.6 % (ref 10–15)
GFR SERPL CREATININE-BSD FRML MDRD: >90 ML/MIN/{1.73_M2}
GLUCOSE SERPL-MCNC: 91 MG/DL (ref 70–99)
HBA1C MFR BLD: 6 % (ref 0–5.6)
HCT VFR BLD AUTO: 43.4 % (ref 35–47)
HDLC SERPL-MCNC: 45 MG/DL
HGB BLD-MCNC: 13.3 G/DL (ref 11.7–15.7)
LDLC SERPL CALC-MCNC: 113 MG/DL
LYMPHOCYTES # BLD AUTO: 2.2 10E9/L (ref 0.8–5.3)
LYMPHOCYTES NFR BLD AUTO: 19 %
MCH RBC QN AUTO: 27.8 PG (ref 26.5–33)
MCHC RBC AUTO-ENTMCNC: 30.6 G/DL (ref 31.5–36.5)
MCV RBC AUTO: 91 FL (ref 78–100)
MONOCYTES # BLD AUTO: 0.6 10E9/L (ref 0–1.3)
MONOCYTES NFR BLD AUTO: 4.9 %
NEUTROPHILS # BLD AUTO: 8.7 10E9/L (ref 1.6–8.3)
NEUTROPHILS NFR BLD AUTO: 74.7 %
NONHDLC SERPL-MCNC: 140 MG/DL
PLATELET # BLD AUTO: 283 10E9/L (ref 150–450)
POTASSIUM SERPL-SCNC: 3.9 MMOL/L (ref 3.4–5.3)
PROT SERPL-MCNC: 8.4 G/DL (ref 6.8–8.8)
RBC # BLD AUTO: 4.79 10E12/L (ref 3.8–5.2)
SODIUM SERPL-SCNC: 133 MMOL/L (ref 133–144)
TRIGL SERPL-MCNC: 137 MG/DL
WBC # BLD AUTO: 11.7 10E9/L (ref 4–11)

## 2020-02-18 PROCEDURE — 80053 COMPREHEN METABOLIC PANEL: CPT | Performed by: FAMILY MEDICINE

## 2020-02-18 PROCEDURE — 85025 COMPLETE CBC W/AUTO DIFF WBC: CPT | Performed by: FAMILY MEDICINE

## 2020-02-18 PROCEDURE — 36415 COLL VENOUS BLD VENIPUNCTURE: CPT | Performed by: FAMILY MEDICINE

## 2020-02-18 PROCEDURE — 83036 HEMOGLOBIN GLYCOSYLATED A1C: CPT | Performed by: FAMILY MEDICINE

## 2020-02-18 PROCEDURE — 90471 IMMUNIZATION ADMIN: CPT | Performed by: FAMILY MEDICINE

## 2020-02-18 PROCEDURE — 90682 RIV4 VACC RECOMBINANT DNA IM: CPT | Performed by: FAMILY MEDICINE

## 2020-02-18 PROCEDURE — 80061 LIPID PANEL: CPT | Performed by: FAMILY MEDICINE

## 2020-02-18 PROCEDURE — 99214 OFFICE O/P EST MOD 30 MIN: CPT | Performed by: FAMILY MEDICINE

## 2020-02-18 RX ORDER — ALBUTEROL SULFATE 90 UG/1
2 AEROSOL, METERED RESPIRATORY (INHALATION) EVERY 6 HOURS PRN
Qty: 18 G | Refills: 4 | Status: SHIPPED | OUTPATIENT
Start: 2020-02-18 | End: 2020-09-16

## 2020-02-18 RX ORDER — CLONAZEPAM 0.5 MG/1
0.25 TABLET ORAL 3 TIMES DAILY PRN
Qty: 45 TABLET | Refills: 5 | Status: SHIPPED | OUTPATIENT
Start: 2020-02-18 | End: 2020-09-03

## 2020-02-18 NOTE — PROGRESS NOTES
Subjective     Cris Priest is a 59 year old female who presents to clinic today for the following health issues:    HPI   Anxiety Follow-Up    How are you doing with your anxiety since your last visit? Stable    Are you having other symptoms that might be associated with anxiety? No    Have you had a significant life event? OTHER: surgery     Are you feeling depressed? No    Do you have any concerns with your use of alcohol or other drugs? No  Prescribed Klonipin uses a 1/2 tablet (0.25mg) up to 3 times daily for anxiety #45 will last one month as the contract states.  Has seen Dr. [facility] for this and if things change patient very willing to see again.    Social History     Tobacco Use     Smoking status: Former Smoker     Packs/day: 0.50     Years: 37.00     Pack years: 18.50     Types: Other     Last attempt to quit: 2015     Years since quittin.3     Smokeless tobacco: Never Used     Tobacco comment: Use Electronic Cigarette   Substance Use Topics     Alcohol use: No     Alcohol/week: 0.0 standard drinks     Drug use: No     EUGENIE-7 SCORE 2018   Total Score - - -   Total Score 2 7 2     PHQ 2018   PHQ-9 Total Score 1 0 0   Q9: Thoughts of better off dead/self-harm past 2 weeks Not at all Not at all Not at all         How many servings of fruits and vegetables do you eat daily?  2-3    On average, how many sweetened beverages do you drink each day (Examples: soda, juice, sweet tea, etc.  Do NOT count diet or artificially sweetened beverages)?   0-1    How many days per week do you exercise enough to make your heart beat faster? N/A- just had back surgery    How many minutes a day do you exercise enough to make your heart beat faster? N/A    How many days per week do you miss taking your medication? 0    Medication Followup of inhaler- Acute bronchitis with symptoms > 10 days    Taking Medication as prescribed: yes    Side Effects:   None    Medication Helping Symptoms:  yes     Lupus Bloodwork.  Last eye exam Dec 15, 2019.  Hasn't needed to fill the prednisone for flares, last was 2 years ago.    Has been following with [facility] for injections and considering a mesh tape to heal a muscle tear and a potential stimulator implant.      BP Readings from Last 3 Encounters:   02/18/20 136/82   06/11/19 122/82   08/30/18 132/82    Wt Readings from Last 3 Encounters:   02/18/20 121.1 kg (267 lb)   06/11/19 122.7 kg (270 lb 6.4 oz)   08/30/18 110.1 kg (242 lb 12.8 oz)                 Reviewed and updated as needed this visit by Provider         Review of Systems   ROS COMP: Constitutional, HEENT, cardiovascular, pulmonary, gi and gu systems are negative, except as otherwise noted.      Objective    /82   Pulse 87   Temp 97.1  F (36.2  C) (Tympanic)   Resp 20   Wt 121.1 kg (267 lb)   SpO2 94%   BMI 41.82 kg/m    Body mass index is 41.82 kg/m .  Physical Exam   GENERAL: healthy, alert and no distress  PSYCH: mentation appears normal, affect normal/bright    Diagnostic Test Results:  Labs reviewed in Epic        Assessment & Plan     Cris was seen today for recheck medication and autoimmune disease.    Diagnoses and all orders for this visit:    GENERALIZED ANXIETY DIS: stable, refill  -     clonazePAM 0.5 MG PO tablet; Take 0.5 tablets (0.25 mg) by mouth 3 times daily as needed for anxiety #45 to last one month with 5 refills.    Acute bronchitis with symptoms > 10 days  -     albuterol 108 (90 Base) MCG/ACT IN inhaler; Inhale 2 puffs into the lungs every 6 hours as needed for shortness of breath / dyspnea or wheezing    Systemic lupus erythematosus, unspecified SLE type, unspecified organ involvement status (H): recheck labs  -     CBC with platelets differential  -     Comprehensive metabolic panel  -     Hemoglobin A1c  -     Lipid panel reflex to direct LDL Fasting    Recurrent major depressive disorder, in partial remission (H): stable,  "refill    Morbid obesity due to excess calories (H)  -     Lipid panel reflex to direct LDL Fasting         BMI:   Estimated body mass index is 41.82 kg/m  as calculated from the following:    Height as of 6/11/19: 1.702 m (5' 7\").    Weight as of this encounter: 121.1 kg (267 lb).   Weight management plan: Discussed healthy diet and exercise guidelines        Patient Instructions   1. To lab    I sent refills for the clonazepam      Return in about 6 months (around 8/18/2020).    Luca Glasgow MD  [facility]      "

## 2020-02-27 ENCOUNTER — MYC MEDICAL ADVICE (OUTPATIENT)
Dept: FAMILY MEDICINE | Facility: CLINIC | Age: 60
End: 2020-02-27

## 2020-02-27 NOTE — TELEPHONE ENCOUNTER
See freshbag message regarding question about stopping medications prior to trip.      Kennedi SMITH BSN, RN

## 2020-03-06 RX ORDER — MORPHINE SULFATE 15 MG/1
TABLET, FILM COATED, EXTENDED RELEASE ORAL
Refills: 0 | Status: CANCELLED | OUTPATIENT
Start: 2020-03-06

## 2020-03-06 NOTE — TELEPHONE ENCOUNTER
RX ON FILE  BEFORE USE.  PLEASE SEND REPLACEMENT ORDER.  THANKS,   Rody Melendrez  Certified Pharmacy Technician  [facility]  [phone]

## 2020-03-27 ENCOUNTER — MYC MEDICAL ADVICE (OUTPATIENT)
Dept: FAMILY MEDICINE | Facility: CLINIC | Age: 60
End: 2020-03-27

## 2020-03-31 NOTE — TELEPHONE ENCOUNTER
Can we please check Dr. Glasgow's office for forms.  The forms are not on my desk anymore.    Thank you,  Sandra

## 2020-04-01 NOTE — TELEPHONE ENCOUNTER
Form received and rerouted to Dr. Glasgow to determine if form could be updated to reflect patient request.

## 2020-04-02 NOTE — TELEPHONE ENCOUNTER
Form completed, signed, and faxed to [facility] at [phone] and patient notified of status via My Chart.

## 2020-04-11 ENCOUNTER — MYC MEDICAL ADVICE (OUTPATIENT)
Dept: FAMILY MEDICINE | Facility: CLINIC | Age: 60
End: 2020-04-11

## 2020-04-13 ENCOUNTER — MYC MEDICAL ADVICE (OUTPATIENT)
Dept: FAMILY MEDICINE | Facility: CLINIC | Age: 60
End: 2020-04-13

## 2020-04-14 NOTE — TELEPHONE ENCOUNTER
Request for copy of FMLA form received.  Form was printed and mailed to patient address and patient notified of status via My Chart.

## 2020-05-27 ENCOUNTER — MYC MEDICAL ADVICE (OUTPATIENT)
Dept: FAMILY MEDICINE | Facility: CLINIC | Age: 60
End: 2020-05-27

## 2020-05-27 DIAGNOSIS — F41.1 GAD (GENERALIZED ANXIETY DISORDER): ICD-10-CM

## 2020-05-28 RX ORDER — AMITRIPTYLINE HYDROCHLORIDE 100 MG/1
100 TABLET ORAL AT BEDTIME
Qty: 30 TABLET | Refills: 1 | Status: SHIPPED | OUTPATIENT
Start: 2020-05-28 | End: 2020-08-17

## 2020-05-28 NOTE — TELEPHONE ENCOUNTER
Please see mychart for FYI on injections and would like to know if you will increase her amitriptyline (warning states 75 mg is recommended max dose?    Med and pharm ready.    Routing to provider.  Michaela WALKER RN

## 2020-07-01 DIAGNOSIS — F33.41 RECURRENT MAJOR DEPRESSIVE DISORDER, IN PARTIAL REMISSION (H): ICD-10-CM

## 2020-07-01 DIAGNOSIS — M32.9 SYSTEMIC LUPUS ERYTHEMATOSUS, UNSPECIFIED SLE TYPE, UNSPECIFIED ORGAN INVOLVEMENT STATUS (H): ICD-10-CM

## 2020-07-01 NOTE — LETTER
July 2, 2020      Cris Priest  [address] [place] [address]        Dear Cris,     We received a refill request for your hydroxychloroquine, sertraline and azathioprine medication.  This medication has been refilled for 30 days as you are due for an office or virtual visit for further refills.  Please call [phone] to schedule an appointment.        Sincerely,        Luca Glasgow MD

## 2020-07-02 RX ORDER — HYDROXYCHLOROQUINE SULFATE 200 MG/1
TABLET, FILM COATED ORAL
Qty: 90 TABLET | Refills: 0 | Status: SHIPPED | OUTPATIENT
Start: 2020-07-02 | End: 2020-09-16

## 2020-07-02 RX ORDER — SERTRALINE HYDROCHLORIDE 100 MG/1
TABLET, FILM COATED ORAL
Qty: 60 TABLET | Refills: 0 | Status: SHIPPED | OUTPATIENT
Start: 2020-07-02 | End: 2020-09-16

## 2020-07-02 RX ORDER — AZATHIOPRINE 50 MG/1
TABLET ORAL
Qty: 90 TABLET | Refills: 0 | Status: SHIPPED | OUTPATIENT
Start: 2020-07-02 | End: 2020-09-16

## 2020-07-02 NOTE — TELEPHONE ENCOUNTER
Routing refill request to provider for review/approval because:  Drug not on the [facility] refill protocol     Zulema Silveira RN

## 2020-08-14 ENCOUNTER — MYC MEDICAL ADVICE (OUTPATIENT)
Dept: FAMILY MEDICINE | Facility: CLINIC | Age: 60
End: 2020-08-14

## 2020-09-03 ENCOUNTER — MYC MEDICAL ADVICE (OUTPATIENT)
Dept: FAMILY MEDICINE | Facility: CLINIC | Age: 60
End: 2020-09-03

## 2020-09-03 DIAGNOSIS — F41.1 GENERALIZED ANXIETY DISORDER: ICD-10-CM

## 2020-09-03 RX ORDER — CLONAZEPAM 0.5 MG/1
0.25 TABLET ORAL 3 TIMES DAILY PRN
Qty: 45 TABLET | Refills: 0 | Status: SHIPPED | OUTPATIENT
Start: 2020-09-03 | End: 2020-09-16

## 2020-09-03 NOTE — TELEPHONE ENCOUNTER
Routing refill request to provider for review/approval because:  Drug not on the [facility] refill protocol   She is requesting short refill until her appt with you 9-8-20.    Requested Prescriptions   Pending Prescriptions Disp Refills     clonazePAM (KLONOPIN) 0.5 MG tablet 45 tablet 5     Sig: Take 0.5 tablets (0.25 mg) by mouth 3 times daily as needed for anxiety       There is no refill protocol information for this order        Michaela WALKER RN BSN

## 2020-09-16 ENCOUNTER — VIRTUAL VISIT (OUTPATIENT)
Dept: FAMILY MEDICINE | Facility: CLINIC | Age: 60
End: 2020-09-16
Payer: COMMERCIAL

## 2020-09-16 DIAGNOSIS — F41.1 GAD (GENERALIZED ANXIETY DISORDER): ICD-10-CM

## 2020-09-16 DIAGNOSIS — F41.1 GENERALIZED ANXIETY DISORDER: ICD-10-CM

## 2020-09-16 DIAGNOSIS — F33.41 RECURRENT MAJOR DEPRESSIVE DISORDER, IN PARTIAL REMISSION (H): ICD-10-CM

## 2020-09-16 DIAGNOSIS — M32.9 SYSTEMIC LUPUS ERYTHEMATOSUS, UNSPECIFIED SLE TYPE, UNSPECIFIED ORGAN INVOLVEMENT STATUS (H): ICD-10-CM

## 2020-09-16 DIAGNOSIS — J20.9 ACUTE BRONCHITIS WITH SYMPTOMS > 10 DAYS: ICD-10-CM

## 2020-09-16 PROCEDURE — 96127 BRIEF EMOTIONAL/BEHAV ASSMT: CPT | Performed by: FAMILY MEDICINE

## 2020-09-16 PROCEDURE — 99214 OFFICE O/P EST MOD 30 MIN: CPT | Mod: 95 | Performed by: FAMILY MEDICINE

## 2020-09-16 RX ORDER — AZATHIOPRINE 50 MG/1
TABLET ORAL
Qty: 90 TABLET | Refills: 0 | Status: CANCELLED | OUTPATIENT
Start: 2020-09-16

## 2020-09-16 RX ORDER — AMITRIPTYLINE HYDROCHLORIDE 100 MG/1
TABLET ORAL
Qty: 30 TABLET | Refills: 0 | Status: CANCELLED | OUTPATIENT
Start: 2020-09-16

## 2020-09-16 RX ORDER — PREDNISONE 1 MG/1
1 TABLET ORAL DAILY
COMMUNITY
End: 2021-06-11

## 2020-09-16 RX ORDER — HYDROXYCHLOROQUINE SULFATE 200 MG/1
TABLET, FILM COATED ORAL
Qty: 90 TABLET | Refills: 0 | Status: CANCELLED | OUTPATIENT
Start: 2020-09-16

## 2020-09-16 RX ORDER — SERTRALINE HYDROCHLORIDE 100 MG/1
200 TABLET, FILM COATED ORAL DAILY
Qty: 180 TABLET | Refills: 1 | Status: SHIPPED | OUTPATIENT
Start: 2020-09-16 | End: 2021-06-11

## 2020-09-16 RX ORDER — AMITRIPTYLINE HYDROCHLORIDE 100 MG/1
100 TABLET ORAL AT BEDTIME
Qty: 90 TABLET | Refills: 1 | Status: SHIPPED | OUTPATIENT
Start: 2020-09-16 | End: 2021-03-24

## 2020-09-16 RX ORDER — AZATHIOPRINE 50 MG/1
50 TABLET ORAL 3 TIMES DAILY
Qty: 90 TABLET | Refills: 5 | Status: SHIPPED | OUTPATIENT
Start: 2020-09-16 | End: 2021-06-11

## 2020-09-16 RX ORDER — SERTRALINE HYDROCHLORIDE 100 MG/1
TABLET, FILM COATED ORAL
Qty: 60 TABLET | Refills: 0 | Status: CANCELLED | OUTPATIENT
Start: 2020-09-16

## 2020-09-16 RX ORDER — HYDROXYZINE HYDROCHLORIDE 25 MG/1
25-50 TABLET, FILM COATED ORAL EVERY 6 HOURS PRN
Qty: 60 TABLET | Refills: 6 | Status: CANCELLED | OUTPATIENT
Start: 2020-09-16

## 2020-09-16 RX ORDER — HYDROXYCHLOROQUINE SULFATE 200 MG/1
600 TABLET, FILM COATED ORAL DAILY
Qty: 90 TABLET | Refills: 5 | Status: SHIPPED | OUTPATIENT
Start: 2020-09-16 | End: 2021-06-11

## 2020-09-16 RX ORDER — ALBUTEROL SULFATE 90 UG/1
2 AEROSOL, METERED RESPIRATORY (INHALATION) EVERY 6 HOURS PRN
Qty: 18 G | Refills: 4 | Status: SHIPPED | OUTPATIENT
Start: 2020-09-16 | End: 2021-02-23

## 2020-09-16 RX ORDER — CLONAZEPAM 0.5 MG/1
0.25 TABLET ORAL 3 TIMES DAILY PRN
Qty: 45 TABLET | Refills: 5 | Status: SHIPPED | OUTPATIENT
Start: 2020-09-16 | End: 2021-03-17

## 2020-09-16 ASSESSMENT — ANXIETY QUESTIONNAIRES
1. FEELING NERVOUS, ANXIOUS, OR ON EDGE: NOT AT ALL
3. WORRYING TOO MUCH ABOUT DIFFERENT THINGS: NOT AT ALL
6. BECOMING EASILY ANNOYED OR IRRITABLE: NOT AT ALL
5. BEING SO RESTLESS THAT IT IS HARD TO SIT STILL: NOT AT ALL
IF YOU CHECKED OFF ANY PROBLEMS ON THIS QUESTIONNAIRE, HOW DIFFICULT HAVE THESE PROBLEMS MADE IT FOR YOU TO DO YOUR WORK, TAKE CARE OF THINGS AT HOME, OR GET ALONG WITH OTHER PEOPLE: NOT DIFFICULT AT ALL
2. NOT BEING ABLE TO STOP OR CONTROL WORRYING: NOT AT ALL
GAD7 TOTAL SCORE: 0
7. FEELING AFRAID AS IF SOMETHING AWFUL MIGHT HAPPEN: NOT AT ALL

## 2020-09-16 ASSESSMENT — PATIENT HEALTH QUESTIONNAIRE - PHQ9
5. POOR APPETITE OR OVEREATING: NOT AT ALL
SUM OF ALL RESPONSES TO PHQ QUESTIONS 1-9: 0

## 2020-09-16 NOTE — PROGRESS NOTES
"Cris Priest is a 60 year old female who is being evaluated via a billable telephone visit.      The patient has been notified of following:     \"This telephone visit will be conducted via a call between you and your physician/provider. We have found that certain health care needs can be provided without the need for a physical exam.  This service lets us provide the care you need with a short phone conversation.  If a prescription is necessary we can send it directly to your pharmacy.  If lab work is needed we can place an order for that and you can then stop by our lab to have the test done at a later time.    Telephone visits are billed at different rates depending on your insurance coverage. During this emergency period, for some insurers they may be billed the same as an in-person visit.  Please reach out to your insurance provider with any questions.    If during the course of the call the physician/provider feels a telephone visit is not appropriate, you will not be charged for this service.\"    Patient has given verbal consent for Telephone visit?  Yes    What phone number would you like to be contacted at? [phone]    How would you like to obtain your AVS? Dequan Bustamante     Cris Priest is a 60 year old female who presents via phone visit today for the following health issues:    HPI    Hypertension Follow-up      Do you check your blood pressure regularly outside of the clinic? Yes     Are you following a low salt diet? Yes    Are your blood pressures ever more than 140 on the top number (systolic) OR more   than 90 on the bottom number (diastolic), for example 140/90? Yes- 130-140/80-90  No headaches, no chest pain.  BP Readings from Last 3 Encounters:   02/18/20 136/82   06/11/19 122/82   08/30/18 132/82           Depression and Anxiety Follow-Up    How are you doing with your depression since your last visit? Improved. Patient states increasing the amitriptyline really help because she " is sleeping a lot better.    How are you doing with your anxiety since your last visit?  Improved     Are you having other symptoms that might be associated with depression or anxiety? No    Have you had a significant life event? No     Do you have any concerns with your use of alcohol or other drugs? No    Social History     Tobacco Use     Smoking status: Former Smoker     Packs/day: 0.50     Years: 37.00     Pack years: 18.50     Types: Other     Last attempt to quit: 2015     Years since quittin.8     Smokeless tobacco: Never Used     Tobacco comment: Use Electronic Cigarette   Substance Use Topics     Alcohol use: No     Alcohol/week: 0.0 standard drinks     Drug use: No     PHQ 2018   PHQ-9 Total Score 0 0 0   Q9: Thoughts of better off dead/self-harm past 2 weeks Not at all Not at all Not at all     EUGENIE-7 SCORE 2018   Total Score - - -   Total Score 7 2 0       Suicide Assessment Five-step Evaluation and Treatment (SAFE-T)    Chronic Pain Follow-Up    Where in your body do you have pain? Lupus  How has your pain affected your ability to work? Unable to work  Which of these pain treatments have you tried since your last clinic visit? Other: Imuran, prednsone, and morphine. Patient also does Yoga.  How well are you sleeping? Fair  How has your mood been since your last visit? Better  Have you had a significant life event? No  Other aggravating factors: prolonged sitting and prolonged standing  Taking medication as directed? Yes    Hydroxychloroquine: has eye test every January.    PHQ-9 SCORE 2018   PHQ-9 Total Score - - -   PHQ-9 Total Score 1 0 0     EUGENIE-7 SCORE 2018   Total Score - - -   Total Score 2 7 2     No flowsheet data found.  Encounter-Level CSA:    There are no encounter-level csa.     Patient-Level CSA:    There are no patient-level csa.         How many servings of fruits and vegetables do  you eat daily?  2-3    On average, how many sweetened beverages do you drink each day (Examples: soda, juice, sweet tea, etc.  Do NOT count diet or artificially sweetened beverages)?   1    How many days per week do you exercise enough to make your heart beat faster? 3 or less. Does yoga in bed most mornings.    How many minutes a day do you exercise enough to make your heart beat faster? 10 - 19    How many days per week do you miss taking your medication? 0         Review of Systems   Constitutional, HEENT, cardiovascular, pulmonary, gi and gu systems are negative, except as otherwise noted.       Objective          Vitals:  No vitals were obtained today due to virtual visit.    healthy, alert and no distress  PSYCH: Alert and oriented times 3; coherent speech, normal   rate and volume, able to articulate logical thoughts, able   to abstract reason, no tangential thoughts, no hallucinations   or delusions  Her affect is normal and pleasant  RESP: No cough, no audible wheezing, able to talk in full sentences  Remainder of exam unable to be completed due to telephone visits          Assessment/Plan:    Assessment & Plan     Cris was seen today for recheck medication and overall health.    Diagnoses and all orders for this visit:    EUGENIE (generalized anxiety disorder): stable, refill  -     amitriptyline (ELAVIL) 100 MG tablet; Take 1 tablet (100 mg) by mouth At Bedtime    Systemic lupus erythematosus, unspecified SLE type, unspecified organ involvement status (H)  -     hydroxychloroquine (PLAQUENIL) 200 MG tablet; Take 3 tablets (600 mg) by mouth daily  -     azaTHIOprine (IMURAN) 50 MG tablet; Take 1 tablet (50 mg) by mouth 3 times daily      Recurrent major depressive disorder, in partial remission (H): stable, refill for 6 months  -     sertraline (ZOLOFT) 100 MG tablet; Take 2 tablets (200 mg) by mouth daily    GENERALIZED ANXIETY DIS: stable, refill  -     clonazePAM (KLONOPIN) 0.5 MG tablet; Take 0.5 tablets  (0.25 mg) by mouth 3 times daily as needed for anxiety    Acute bronchitis with symptoms > 10 days: not recent flares  -     albuterol (PROAIR HFA/PROVENTIL HFA/VENTOLIN HFA) 108 (90 Base) MCG/ACT inhaler; Inhale 2 puffs into the lungs every 6 hours as needed for shortness of breath / dyspnea or wheezing           There are no Patient Instructions on file for this visit.    Return in about 6 months (around 3/16/2021).    Luca Glasgow MD  [facility]    Phone call duration:  10 minutes

## 2020-09-17 ASSESSMENT — ANXIETY QUESTIONNAIRES: GAD7 TOTAL SCORE: 0

## 2020-10-27 ENCOUNTER — TELEPHONE (OUTPATIENT)
Dept: FAMILY MEDICINE | Facility: CLINIC | Age: 60
End: 2020-10-27

## 2020-10-28 NOTE — TELEPHONE ENCOUNTER
[facility] for Ahsan to care for patient.  Request for completion of form received from [facility] but no form was attached to request.  I have sent the request back to [facility] indicating need for them to send us the form.

## 2020-10-30 NOTE — TELEPHONE ENCOUNTER
[employer] FML[employer] form for Ahsan (spouse) to care for patient received.  Form completed based on previously completed form and routed to Dr. Glasgow for review and signature.

## 2020-11-06 ENCOUNTER — HOSPITAL ENCOUNTER (OUTPATIENT)
Dept: GENERAL RADIOLOGY | Facility: CLINIC | Age: 60
Discharge: HOME | End: 2020-11-06
Attending: ANESTHESIOLOGY | Admitting: ANESTHESIOLOGY
Payer: COMMERCIAL

## 2020-11-06 DIAGNOSIS — M47.817 SPONDYLOSIS WITHOUT MYELOPATHY OR RADICULOPATHY, LUMBOSACRAL REGION: ICD-10-CM

## 2020-11-06 PROCEDURE — 72110 X-RAY EXAM L-2 SPINE 4/>VWS: CPT

## 2020-11-16 ENCOUNTER — HEALTH MAINTENANCE LETTER (OUTPATIENT)
Age: 60
End: 2020-11-16

## 2020-12-15 ENCOUNTER — MYC MEDICAL ADVICE (OUTPATIENT)
Dept: FAMILY MEDICINE | Facility: CLINIC | Age: 60
End: 2020-12-15

## 2021-02-18 DIAGNOSIS — J20.9 ACUTE BRONCHITIS WITH SYMPTOMS > 10 DAYS: ICD-10-CM

## 2021-02-19 NOTE — TELEPHONE ENCOUNTER
"Requested Prescriptions   Pending Prescriptions Disp Refills     albuterol (PROAIR HFA/PROVENTIL HFA/VENTOLIN HFA) 108 (90 Base) MCG/ACT inhaler [Pharmacy Med Name: ALBUTEROL SULFATE  AERS]  0     Sig: INHALE TWO PUFFS BY MOUTH EVERY 6 HOURS AS NEEDED FOR SHORTNESS OF BREATH / DYSPNEA / WHEEZING       Asthma Maintenance Inhalers - Anticholinergics Passed - 2/18/2021 12:08 PM        Passed - Patient is age 12 years or older        Passed - Recent (12 mo) or future (30 days) visit within the authorizing provider's specialty     Patient has had an office visit with the authorizing provider or a provider within the authorizing providers department within the previous 12 mos or has a future within next 30 days. See \"Patient Info\" tab in inbasket, or \"Choose Columns\" in Meds & Orders section of the refill encounter.              Passed - Medication is active on med list       Short-Acting Beta Agonist Inhalers Protocol  Passed - 2/18/2021 12:08 PM        Passed - Patient is age 12 or older        Passed - Recent (12 mo) or future (30 days) visit within the authorizing provider's specialty     Patient has had an office visit with the authorizing provider or a provider within the authorizing providers department within the previous 12 mos or has a future within next 30 days. See \"Patient Info\" tab in inbasket, or \"Choose Columns\" in Meds & Orders section of the refill encounter.              Passed - Medication is active on med list             "

## 2021-02-23 RX ORDER — ALBUTEROL SULFATE 90 UG/1
AEROSOL, METERED RESPIRATORY (INHALATION)
Qty: 8.5 G | Refills: 1 | Status: SHIPPED | OUTPATIENT
Start: 2021-02-23 | End: 2021-04-15

## 2021-03-13 ENCOUNTER — APPOINTMENT (OUTPATIENT)
Dept: URGENT CARE | Facility: CLINIC | Age: 61
End: 2021-03-13
Payer: COMMERCIAL

## 2021-03-17 ENCOUNTER — MYC MEDICAL ADVICE (OUTPATIENT)
Dept: FAMILY MEDICINE | Facility: CLINIC | Age: 61
End: 2021-03-17

## 2021-03-17 DIAGNOSIS — F41.1 GAD (GENERALIZED ANXIETY DISORDER): ICD-10-CM

## 2021-03-17 DIAGNOSIS — F41.1 GENERALIZED ANXIETY DISORDER: ICD-10-CM

## 2021-03-17 RX ORDER — CLONAZEPAM 0.5 MG/1
TABLET ORAL
Qty: 45 TABLET | Refills: 0 | Status: SHIPPED | OUTPATIENT
Start: 2021-03-17 | End: 2021-03-24

## 2021-03-19 NOTE — TELEPHONE ENCOUNTER
"Requested Prescriptions   Pending Prescriptions Disp Refills     amitriptyline (ELAVIL) 100 MG tablet [Pharmacy Med Name: AMITRIPTYLINE HCL 100MG TABS] 90 tablet 1     Sig: TAKE 1 TABLET (100 MG) BY MOUTH AT BEDTIME       Tricyclic Agents ( Annual appt and no PHQ9) Failed - 3/17/2021  1:43 PM        Failed - Blood Pressure under 140/90 in past 12 mos     BP Readings from Last 3 Encounters:   02/18/20 136/82   06/11/19 122/82   08/30/18 132/82                 Passed - Recent (12 mo) or future (30 days) visit within authorizing provider's specialty     Patient has had an office visit with the authorizing provider or a provider within the authorizing providers department within the previous 12 mos or has a future within next 30 days. See \"Patient Info\" tab in inbasket, or \"Choose Columns\" in Meds & Orders section of the refill encounter.              Passed - Medication is active on med list        Passed - Patient is age 18 or older        Passed - Patient is not pregnant        Passed - No positive pregnancy test on record in past 12 mos         Signed Prescriptions Disp Refills    clonazePAM (KLONOPIN) 0.5 MG tablet 45 tablet 0     Sig: TAKE 1/2 TABLET (0.25 MG) BY MOUTH 3 TIMES DAILY AS NEEDED FOR ANXIETY       There is no refill protocol information for this order          "

## 2021-03-24 RX ORDER — AMITRIPTYLINE HYDROCHLORIDE 100 MG/1
100 TABLET ORAL AT BEDTIME
Qty: 30 TABLET | Refills: 0 | Status: SHIPPED | OUTPATIENT
Start: 2021-03-24 | End: 2021-05-17

## 2021-04-08 ENCOUNTER — MYC MEDICAL ADVICE (OUTPATIENT)
Dept: FAMILY MEDICINE | Facility: CLINIC | Age: 61
End: 2021-04-08

## 2021-04-08 NOTE — TELEPHONE ENCOUNTER
See patient message and attached images (3). Patient has pre-op appointment with Adelaida 4/12/21. FYI to provider.

## 2021-04-09 NOTE — PROGRESS NOTES
"[facility]  [address] [address]  Phone: [phone]  Primary Provider: Luca Glasgow  Pre-op Performing Provider: SIGIFREDO FLETCHER      PREOPERATIVE EVALUATION:  Today's date: 4/12/2021    Cris Priest is a 61 year old female who presents for a preoperative evaluation.    Surgical Information:  Surgery/Procedure: spine surgery-   Inserting simplicity \"minute man\"- lumbar spine  L2-L5  Surgery Location: [facility]  Surgeon: Dr. Forte  Surgery Date: 4/15/2021  Time of Surgery: 1015 arrival  Where patient plans to recover: At home with family  Fax number for surgical facility: [phone]- [facility]    Type of Anesthesia Anticipated: mac    Assessment & Plan     The proposed surgical procedure is considered INTERMEDIATE risk.    Pre-op evaluation  Chronic low back pain, unspecified back pain laterality, unspecified whether sciatica present       Risks and Recommendations:  The patient has the following additional risks and recommendations for perioperative complications:   - No identified additional risk factors other than previously addressed    Medication Instructions:  Patient is to take all scheduled medications on the day of surgery    RECOMMENDATION:  APPROVAL GIVEN to proceed with proposed procedure, without further diagnostic evaluation.                      Subjective     HPI related to upcoming procedure:   Chronic back pain after fall from a ladder 2018    Preop Questions 4/8/2021   1. Have you ever had a heart attack or stroke? No   2. Have you ever had surgery on your heart or blood vessels, such as a stent placement, a coronary artery bypass, or surgery on an artery in your head, neck, heart, or legs? No   3. Do you have chest pain with activity? No   4. Do you have a history of  heart failure? No   5. Do you currently have a cold, bronchitis or symptoms of other infection? No   6. Do you have a cough, " shortness of breath, or wheezing? No   7. Do you or anyone in your family have previous history of blood clots? No   8. Do you or does anyone in your family have a serious bleeding problem such as prolonged bleeding following surgeries or cuts? No   9. Have you ever had problems with anemia or been told to take iron pills? No   10. Have you had any abnormal blood loss such as black, tarry or bloody stools, or abnormal vaginal bleeding? No   11. Have you ever had a blood transfusion? No   12. Are you willing to have a blood transfusion if it is medically needed before, during, or after your surgery? Yes   13. Have you or any of your relatives ever had problems with anesthesia? No   14. Do you have sleep apnea, excessive snoring or daytime drowsiness? No   15. Do you have any artifical heart valves or other implanted medical devices like a pacemaker, defibrillator, or continuous glucose monitor? No   16. Do you have artificial joints? YES    17. Are you allergic to latex? No   18. Is there any chance that you may be pregnant? -       Health Care Directive:  Patient does not have a Health Care Directive or Living Will: Patient states has Advance Directive and will bring in a copy to clinic.      Status of Chronic Conditions:  See problem list for active medical problems.  Problems all longstanding and stable, except as noted/documented.  See ROS for pertinent symptoms related to these conditions.      Review of Systems  CONSTITUTIONAL: NEGATIVE for fever, chills, change in weight  INTEGUMENTARY/SKIN: NEGATIVE for worrisome rashes, moles or lesions  EYES: NEGATIVE for vision changes or irritation  ENT/MOUTH: NEGATIVE for ear, mouth and throat problems  RESP: NEGATIVE for significant cough or SOB  BREAST: NEGATIVE for masses, tenderness or discharge  CV: NEGATIVE for chest pain, palpitations or peripheral edema  GI: NEGATIVE for nausea, abdominal pain, heartburn, or change in bowel habits  : NEGATIVE for frequency,  dysuria, or hematuria  MUSCULOSKELETAL: NEGATIVE for significant arthralgias or myalgia  NEURO: NEGATIVE for weakness, dizziness or paresthesias  ENDOCRINE: NEGATIVE for temperature intolerance, skin/hair changes  HEME: NEGATIVE for bleeding problems  PSYCHIATRIC: NEGATIVE for changes in mood or affect    Patient Active Problem List    Diagnosis Date Noted     S/P total knee arthroplasty 01/07/2016     Priority: Medium     Left knee DJD 01/04/2016     Priority: Medium     On prednisone therapy 08/20/2015     Priority: Medium     Advanced directives, counseling/discussion 08/06/2014     Priority: Medium     Paperwork given to patient to fill out and return to [facility]       Essential hypertension 12/31/2013     Priority: Medium     Morbid obesity due to excess calories (H) 10/26/2012     Priority: Medium     Hyperlipidemia LDL goal <130 10/26/2012     Priority: Medium     Pain medication agreement 06/16/2011     Priority: Medium     On for chronic pain and fibromyalgia    Dr. Forte following  PRN tylenol #3           Osteoporosis 05/08/2008     Priority: Medium     Problem list name updated by automated process. Provider to review       GENERALIZED ANXIETY DIS 01/17/2008     Priority: Medium     Patient is followed by Mara Littlejohn DNP, RN, APRN and Dr. Moore  Med: Klonipin 0.5mg   diagnosis: EUGENIE  Maximum use per month: #45  Expected duration: lifetime  Clinic visit recommended: Q 3 month         Recurrent major depressive disorder, in partial remission (H) 06/21/2006     Priority: Medium     Systemic lupus erythematosus (H) 03/22/2005     Priority: Medium     alopecia, arthralgias, fever, no kidney dz       Fibromyalgia 03/22/2005     Priority: Medium     Problem list name updated by automated process. Provider to review        Past Medical History:   Diagnosis Date     Arthritis 1990     Depressive disorder 1963     FIBROMYALGIA      Hypertension 2011     Pneumonia 10/12/2008     Systemic lupus erythematosus (H)   "   alopecia, arthralgias, fever, no kidney dz     Systemic lupus erythematosus (H) 3/22/2005     Past Surgical History:   Procedure Laterality Date     ABDOMEN SURGERY       ARTHROPLASTY KNEE Left 1/4/2016    Procedure: ARTHROPLASTY KNEE;  Surgeon: Malcolm Ramsay MD;  Location: [facility] OR     BIOPSY  [id number]    Chest area \"lupus\" & 2 spots on breast & lung     C APPENDECTOMY       C LAPAROSCOPY, SURGICAL; W/ VAGINAL HYSTERECTOMY W/WO REMOVAL OVARY(S)/TUBES  10/02    Laparoscopy, Vag Hysterectomy     COLONOSCOPY  2013     HC REMOVAL OF OVARY/TUBE(S)      Salpingo-Oophorectomy, Unilateral LEFT     HC REMOVAL OF OVARY/TUBE(S)  10/02    Salpingo-Oophorectomy, Unilateral RIGHT     HC REMOVE TONSILS/ADENOIDS,<9 Y/O      T & A <10y.o.     HEAD & NECK SURGERY  2007    2 nodes taken for test Lymphoma     ORTHOPEDIC SURGERY      TKA  right     REPAIR HAMMER TOE  11/9/2011    Procedure:REPAIR HAMMER TOE; Right excision 5th metatarsal head ; Surgeon:TEENA REHMAN; Location:[place] OR     SURGICAL HISTORY OF -   6/04    LEFT KNEE MEDIAL MENISCECTOMY     Current Outpatient Medications   Medication Sig Dispense Refill     albuterol (PROAIR HFA/PROVENTIL HFA/VENTOLIN HFA) 108 (90 Base) MCG/ACT inhaler INHALE TWO PUFFS BY MOUTH EVERY 6 HOURS AS NEEDED FOR SHORTNESS OF BREATH / DYSPNEA / WHEEZING 8.5 g 1     amitriptyline (ELAVIL) 100 MG tablet TAKE 1 TABLET (100 MG) BY MOUTH AT BEDTIME 30 tablet 0     azaTHIOprine (IMURAN) 50 MG tablet Take 1 tablet (50 mg) by mouth 3 times daily 90 tablet 5     CALCIUM 500 MG OR TABS 1 TABLET ORALLY 3 TIMES DAILY  0     clonazePAM (KLONOPIN) 0.5 MG tablet TAKE 1/2 TABLET (0.25 MG) BY MOUTH 3 TIMES DAILY AS NEEDED FOR ANXIETY 45 tablet 0     conjugated estrogens (PREMARIN) vaginal cream Place 0.5 g vaginally twice a week 12 g 3     cyclobenzaprine (FLEXERIL) 10 MG tablet Take 1 tablet (10 mg) by mouth 2 times daily as needed for muscle spasms Start at night only, and increase as tolerated " "to daytime use, up to three times a day.  Take regularly at least 1-2 weeks, for any pain flair. 60 tablet 3     hydroxychloroquine (PLAQUENIL) 200 MG tablet Take 3 tablets (600 mg) by mouth daily 90 tablet 5     hydrOXYzine (ATARAX) 25 MG tablet Take 1-2 tablets (25-50 mg) by mouth every 6 hours as needed for itching or anxiety 60 tablet 6     morphine (MS CONTIN) 15 MG CR tablet   0     predniSONE (DELTASONE) 1 MG tablet Take 1 mg by mouth daily       sertraline (ZOLOFT) 100 MG tablet Take 2 tablets (200 mg) by mouth daily 180 tablet 1     VITAMIN D PO Take 5,000 Units by mouth every evening          Allergies   Allergen Reactions     Buspar [Buspirone] Hives     Cymbalta Hives     Gabapentin Hives and Itching     Hives and throat itching     Ivp Dye [Contrast Dye] Anaphylaxis     Seafood Anaphylaxis     Shellfish Allergy Swelling     Throat close up and swelling.        Social History     Tobacco Use     Smoking status: Light Tobacco Smoker     Packs/day: 0.50     Years: 10.00     Pack years: 5.00     Types: Cigarettes, Other     Last attempt to quit: 2015     Years since quittin.4     Smokeless tobacco: Current User     Tobacco comment: Use Electronic Cigarette   Substance Use Topics     Alcohol use: No     Alcohol/week: 0.0 standard drinks       History   Drug Use No         Objective     /82   Pulse 90   Temp 99.8  F (37.7  C) (Tympanic)   Ht 1.702 m (5' 7\")   Wt 115.2 kg (254 lb)   SpO2 94%   BMI 39.78 kg/m      Physical Exam  GENERAL APPEARANCE: healthy, alert and no distress  HENT: ear canals and TM's normal and nose and mouth without ulcers or lesions  NECK: no adenopathy, no asymmetry, masses, or scars and thyroid normal to palpation  RESP: lungs clear to auscultation - no rales, rhonchi or wheezes  CV: regular rate and rhythm, normal S1 S2, no S3 or S4 and no murmur, click or rub   ABDOMEN: soft, nontender, no HSM or masses and bowel sounds normal  MS: no peripheral edema  NEURO: " Normal strength and tone, sensory exam grossly normal, mentation intact and speech normal    Recent Labs   Lab Test 02/18/20  1622 06/11/19  1601   HGB 13.3 13.8    299    133   POTASSIUM 3.9 4.4   CR 0.67 0.73   A1C 6.0*  --         Diagnostics:  No labs were ordered during this visit.   No EKG required for low risk surgery (cataract, skin procedure, breast biopsy, etc).    Revised Cardiac Risk Index (RCRI):  The patient has the following serious cardiovascular risks for perioperative complications:   - No serious cardiac risks = 0 points     RCRI Interpretation: 0 points: Class I (very low risk - 0.4% complication rate)           Signed Electronically by: SHUN Lucas CNP  Copy of this evaluation report is provided to requesting physician.

## 2021-04-12 ENCOUNTER — OFFICE VISIT (OUTPATIENT)
Dept: FAMILY MEDICINE | Facility: CLINIC | Age: 61
End: 2021-04-12
Payer: COMMERCIAL

## 2021-04-12 VITALS
SYSTOLIC BLOOD PRESSURE: 132 MMHG | HEIGHT: 67 IN | HEART RATE: 90 BPM | TEMPERATURE: 99.8 F | WEIGHT: 254 LBS | BODY MASS INDEX: 39.87 KG/M2 | DIASTOLIC BLOOD PRESSURE: 82 MMHG | OXYGEN SATURATION: 94 %

## 2021-04-12 DIAGNOSIS — Z01.818 PRE-OP EVALUATION: Primary | ICD-10-CM

## 2021-04-12 DIAGNOSIS — G89.29 CHRONIC LOW BACK PAIN, UNSPECIFIED BACK PAIN LATERALITY, UNSPECIFIED WHETHER SCIATICA PRESENT: ICD-10-CM

## 2021-04-12 DIAGNOSIS — M54.50 CHRONIC LOW BACK PAIN, UNSPECIFIED BACK PAIN LATERALITY, UNSPECIFIED WHETHER SCIATICA PRESENT: ICD-10-CM

## 2021-04-12 PROCEDURE — 99214 OFFICE O/P EST MOD 30 MIN: CPT | Performed by: NURSE PRACTITIONER

## 2021-04-12 ASSESSMENT — MIFFLIN-ST. JEOR: SCORE: 1739.77

## 2021-04-14 ENCOUNTER — MYC MEDICAL ADVICE (OUTPATIENT)
Dept: FAMILY MEDICINE | Facility: CLINIC | Age: 61
End: 2021-04-14

## 2021-04-14 DIAGNOSIS — J20.9 ACUTE BRONCHITIS WITH SYMPTOMS > 10 DAYS: ICD-10-CM

## 2021-04-14 DIAGNOSIS — F41.1 GENERALIZED ANXIETY DISORDER: ICD-10-CM

## 2021-04-15 RX ORDER — CLONAZEPAM 0.5 MG/1
TABLET ORAL
Qty: 45 TABLET | Refills: 1 | Status: SHIPPED | OUTPATIENT
Start: 2021-04-15 | End: 2021-06-11

## 2021-04-15 RX ORDER — ALBUTEROL SULFATE 90 UG/1
AEROSOL, METERED RESPIRATORY (INHALATION)
Qty: 8.5 G | Refills: 1 | Status: SHIPPED | OUTPATIENT
Start: 2021-04-15 | End: 2021-05-21

## 2021-04-28 ENCOUNTER — TELEPHONE (OUTPATIENT)
Dept: FAMILY MEDICINE | Facility: CLINIC | Age: 61
End: 2021-04-28

## 2021-04-29 NOTE — TELEPHONE ENCOUNTER
LA for  Ahsan to care for patient started and routed to Dr. Glasgow to hold for appt 5/12/2021 at 4:40

## 2021-05-05 ENCOUNTER — MYC MEDICAL ADVICE (OUTPATIENT)
Dept: FAMILY MEDICINE | Facility: CLINIC | Age: 61
End: 2021-05-05

## 2021-05-13 NOTE — TELEPHONE ENCOUNTER
FMLA form completed, signed, and faxed to [facility] at [phone].  Copy placed in cabinet and copy sent to scan.

## 2021-05-20 ENCOUNTER — IMMUNIZATION (OUTPATIENT)
Dept: LAB | Facility: CLINIC | Age: 61
End: 2021-05-20
Payer: COMMERCIAL

## 2021-05-20 ENCOUNTER — TRANSFERRED RECORDS (OUTPATIENT)
Dept: HEALTH INFORMATION MANAGEMENT | Facility: CLINIC | Age: 61
End: 2021-05-20

## 2021-05-20 DIAGNOSIS — J20.9 ACUTE BRONCHITIS WITH SYMPTOMS > 10 DAYS: ICD-10-CM

## 2021-05-20 LAB — PHQ9 SCORE: 2

## 2021-05-21 ENCOUNTER — MYC MEDICAL ADVICE (OUTPATIENT)
Dept: FAMILY MEDICINE | Facility: CLINIC | Age: 61
End: 2021-05-21

## 2021-05-21 RX ORDER — ALBUTEROL SULFATE 90 UG/1
AEROSOL, METERED RESPIRATORY (INHALATION)
Qty: 8.5 G | Refills: 1 | Status: SHIPPED | OUTPATIENT
Start: 2021-05-21 | End: 2021-08-13

## 2021-05-21 NOTE — TELEPHONE ENCOUNTER
Forwarding to PCP as FYI of appointment cancellation today.  MyChart sent.  I attempted to remove the appointment, but appears is already arrived status, so I was unable to do this.     Sweta Soto RN  [facility]

## 2021-05-21 NOTE — TELEPHONE ENCOUNTER
Prescription approved per [facility] Refill Protocol.  Luann Swann RN

## 2021-06-11 ENCOUNTER — VIRTUAL VISIT (OUTPATIENT)
Dept: FAMILY MEDICINE | Facility: CLINIC | Age: 61
End: 2021-06-11
Payer: COMMERCIAL

## 2021-06-11 DIAGNOSIS — E66.01 MORBID OBESITY DUE TO EXCESS CALORIES (H): ICD-10-CM

## 2021-06-11 DIAGNOSIS — M32.9 SYSTEMIC LUPUS ERYTHEMATOSUS, UNSPECIFIED SLE TYPE, UNSPECIFIED ORGAN INVOLVEMENT STATUS (H): ICD-10-CM

## 2021-06-11 DIAGNOSIS — M17.12 PRIMARY OSTEOARTHRITIS OF LEFT KNEE: ICD-10-CM

## 2021-06-11 DIAGNOSIS — F41.1 GAD (GENERALIZED ANXIETY DISORDER): ICD-10-CM

## 2021-06-11 DIAGNOSIS — F41.1 GENERALIZED ANXIETY DISORDER: ICD-10-CM

## 2021-06-11 DIAGNOSIS — Z51.81 THERAPEUTIC DRUG MONITORING: Primary | ICD-10-CM

## 2021-06-11 DIAGNOSIS — F33.41 RECURRENT MAJOR DEPRESSIVE DISORDER, IN PARTIAL REMISSION (H): ICD-10-CM

## 2021-06-11 DIAGNOSIS — Z13.220 SCREENING CHOLESTEROL LEVEL: ICD-10-CM

## 2021-06-11 PROCEDURE — 99214 OFFICE O/P EST MOD 30 MIN: CPT | Mod: 95 | Performed by: FAMILY MEDICINE

## 2021-06-11 RX ORDER — CLONAZEPAM 0.5 MG/1
TABLET ORAL
Qty: 45 TABLET | Refills: 5 | Status: SHIPPED | OUTPATIENT
Start: 2021-06-11 | End: 2021-12-21

## 2021-06-11 RX ORDER — AMITRIPTYLINE HYDROCHLORIDE 100 MG/1
TABLET ORAL
Qty: 90 TABLET | Refills: 3 | Status: SHIPPED | OUTPATIENT
Start: 2021-06-11 | End: 2022-06-28

## 2021-06-11 RX ORDER — HYDROXYCHLOROQUINE SULFATE 200 MG/1
600 TABLET, FILM COATED ORAL DAILY
Qty: 90 TABLET | Refills: 5 | Status: SHIPPED | OUTPATIENT
Start: 2021-06-11 | End: 2021-12-21

## 2021-06-11 RX ORDER — HYDROXYZINE HYDROCHLORIDE 25 MG/1
25-50 TABLET, FILM COATED ORAL EVERY 6 HOURS PRN
Qty: 60 TABLET | Refills: 6 | Status: SHIPPED | OUTPATIENT
Start: 2021-06-11 | End: 2021-12-21

## 2021-06-11 RX ORDER — PREDNISONE 1 MG/1
1 TABLET ORAL DAILY
Qty: 90 TABLET | Refills: 1 | Status: SHIPPED | OUTPATIENT
Start: 2021-06-11 | End: 2021-12-21

## 2021-06-11 RX ORDER — AZATHIOPRINE 50 MG/1
50 TABLET ORAL 3 TIMES DAILY
Qty: 90 TABLET | Refills: 5 | Status: SHIPPED | OUTPATIENT
Start: 2021-06-11 | End: 2021-12-21

## 2021-06-11 RX ORDER — SERTRALINE HYDROCHLORIDE 100 MG/1
200 TABLET, FILM COATED ORAL DAILY
Qty: 180 TABLET | Refills: 3 | Status: SHIPPED | OUTPATIENT
Start: 2021-06-11 | End: 2022-06-28

## 2021-06-11 ASSESSMENT — PATIENT HEALTH QUESTIONNAIRE - PHQ9
5. POOR APPETITE OR OVEREATING: NOT AT ALL
SUM OF ALL RESPONSES TO PHQ QUESTIONS 1-9: 1

## 2021-06-11 ASSESSMENT — ANXIETY QUESTIONNAIRES
1. FEELING NERVOUS, ANXIOUS, OR ON EDGE: SEVERAL DAYS
2. NOT BEING ABLE TO STOP OR CONTROL WORRYING: NOT AT ALL
IF YOU CHECKED OFF ANY PROBLEMS ON THIS QUESTIONNAIRE, HOW DIFFICULT HAVE THESE PROBLEMS MADE IT FOR YOU TO DO YOUR WORK, TAKE CARE OF THINGS AT HOME, OR GET ALONG WITH OTHER PEOPLE: NOT DIFFICULT AT ALL
3. WORRYING TOO MUCH ABOUT DIFFERENT THINGS: NOT AT ALL
7. FEELING AFRAID AS IF SOMETHING AWFUL MIGHT HAPPEN: NOT AT ALL
6. BECOMING EASILY ANNOYED OR IRRITABLE: NOT AT ALL
5. BEING SO RESTLESS THAT IT IS HARD TO SIT STILL: NOT AT ALL
GAD7 TOTAL SCORE: 1

## 2021-06-11 NOTE — PROGRESS NOTES
Cris is a 61 year old who is being evaluated via a billable video visit switched to phone appt.    How would you like to obtain your AVS? MyChart  If the video visit is dropped, the invitation should be resent by: Text to cell phone: [phone]  Will anyone else be joining your video visit? No    Assessment & Plan     Recurrent major depressive disorder, in partial remission (H)  Stable, refill  - sertraline (ZOLOFT) 100 MG tablet; Take 2 tablets (200 mg) by mouth daily    Primary osteoarthritis of left knee  Stable, refill  - hydrOXYzine (ATARAX) 25 MG tablet; Take 1-2 tablets (25-50 mg) by mouth every 6 hours as needed for itching or anxiety    GENERALIZED ANXIETY DIS  Stable, refill  - clonazePAM (KLONOPIN) 0.5 MG tablet; TAKE 1/2 TABLET (0.25 MG) BY MOUTH 3 TIMES DAILY AS NEEDED FOR ANXIETY    EUGENIE (generalized anxiety disorder)  Stable, refill  - amitriptyline (ELAVIL) 100 MG tablet; TAKE ONE TABLET BY MOUTH EVERY NIGHT AT BEDTIME    Therapeutic drug monitoring  Monitoring for medications of lupus.  - Lipid panel reflex to direct LDL Fasting; Future  - CBC with platelets; Future  - **Comprehensive metabolic panel FUTURE anytime; Future    Morbid obesity due to excess calories (H)  - **A1C FUTURE anytime; Future    Screening cholesterol level    Systemic lupus erythematosus, unspecified SLE type, unspecified organ involvement status (H)  Stable, refill  - azaTHIOprine (IMURAN) 50 MG tablet; Take 1 tablet (50 mg) by mouth 3 times daily  - predniSONE (DELTASONE) 1 MG tablet; Take 1 tablet (1 mg) by mouth daily  - hydroxychloroquine (PLAQUENIL) 200 MG tablet; Take 3 tablets (600 mg) by mouth daily       Tobacco Cessation:   reports that she has been smoking cigarettes and other. She has a 5.00 pack-year smoking history. She uses smokeless tobacco.  Tobacco Cessation Action Plan: working way down to 3 per day now from 5 vape.    BMI:   Estimated body mass index is 39.78 kg/m  as calculated from the following:     "Height as of 21: 1.702 m (5' 7\").    Weight as of 21: 115.2 kg (254 lb).     See Patient Instructions    No follow-ups on file.    Luca Glasgow MD  [facility] BRANDON Lynch is a 61 year old who presents for the following health issues     HPI     Depression and Anxiety Follow-Up    How are you doing with your depression since your last visit? Stable    How are you doing with your anxiety since your last visit?  Stable    Are you having other symptoms that might be associated with depression or anxiety? No    Have you had a significant life event? OTHER: been really busy with surgeries.     Do you have any concerns with your use of alcohol or other drugs? No    Social History     Tobacco Use     Smoking status: Light Tobacco Smoker     Packs/day: 0.50     Years: 10.00     Pack years: 5.00     Types: Cigarettes, Other     Last attempt to quit: 2015     Years since quittin.6     Smokeless tobacco: Current User     Tobacco comment: Use Electronic Cigarette   Substance Use Topics     Alcohol use: No     Alcohol/week: 0.0 standard drinks     Drug use: No     PHQ 2018   PHQ-9 Total Score 0 0 0   Q9: Thoughts of better off dead/self-harm past 2 weeks Not at all Not at all Not at all     EUGENIE-7 SCORE 2018   Total Score - - -   Total Score 7 2 0     Last PHQ-9 2021   1.  Little interest or pleasure in doing things 0   2.  Feeling down, depressed, or hopeless 1   3.  Trouble falling or staying asleep, or sleeping too much 0   4.  Feeling tired or having little energy 0   5.  Poor appetite or overeating 0   6.  Feeling bad about yourself 0   7.  Trouble concentrating 0   8.  Moving slowly or restless 0   Q9: Thoughts of better off dead/self-harm past 2 weeks 0   PHQ-9 Total Score 1   Difficulty at work, home, or with people Not difficult at all     EUGENIE-7  2021   1. Feeling nervous, anxious, or on edge 1   2. Not " being able to stop or control worrying 0   3. Worrying too much about different things 0   4. Trouble relaxing 0   5. Being so restless that it is hard to sit still 0   6. Becoming easily annoyed or irritable 0   7. Feeling afraid, as if something awful might happen 0   EUGENIE-7 Total Score 1   If you checked any problems, how difficult have they made it for you to do your work, take care of things at home, or get along with other people? Not difficult at all       Suicide Assessment Five-step Evaluation and Treatment (SAFE-T)      How many servings of fruits and vegetables do you eat daily?  2-3    On average, how many sweetened beverages do you drink each day (Examples: soda, juice, sweet tea, etc.  Do NOT count diet or artificially sweetened beverages)?   1    How many days per week do you exercise enough to make your heart beat faster? 0. Has has back surgeries and has not been doing too much. Very limited.    How many minutes a day do you exercise enough to make your heart beat faster? 0    How many days per week do you miss taking your medication? 0      Patient Request: Patient coming in a couple for another pre op. Patient wondering if she can do her Lupus blood work while she is here. Please put in future orders.    Lupus:   Feb 19th, 2021 last eye exam.  Symptoms of lupus stable. No redness or swelling of joints.  Takes imuran 50mg 3 times a day.  Plaquenil 600mg daily.          Review of Systems   Constitutional, HEENT, cardiovascular, pulmonary, gi and gu systems are negative, except as otherwise noted.      Objective           Vitals:  No vitals were obtained today due to virtual visit.    Physical Exam   GENERAL: Healthy, alert and no distress  RESP: No audible wheeze, cough.  Able to speak in full sentences.    NEURO: Cranial nerves grossly intact.  Mentation and speech appropriate for age.  PSYCH: Mentation appears normal, affect normal/bright, judgement and insight intact, normal speech.          Total  of 16 minutes was spent in face to face contact with patient with > 50% in counseling and coordination of care.  Options for treatment and/or follow-up care were reviewed with the patient. Cris Priest was engaged and actively involved in the decision making process. She verbalized understanding of the options discussed and was satisfied with the final plan.

## 2021-06-12 ASSESSMENT — ANXIETY QUESTIONNAIRES: GAD7 TOTAL SCORE: 1

## 2021-06-17 ENCOUNTER — MEDICAL CORRESPONDENCE (OUTPATIENT)
Dept: HEALTH INFORMATION MANAGEMENT | Facility: CLINIC | Age: 61
End: 2021-06-17

## 2021-06-21 ENCOUNTER — MEDICAL CORRESPONDENCE (OUTPATIENT)
Dept: HEALTH INFORMATION MANAGEMENT | Facility: CLINIC | Age: 61
End: 2021-06-21

## 2021-07-05 ENCOUNTER — HOSPITAL ENCOUNTER (OUTPATIENT)
Dept: PHYSICAL THERAPY | Facility: CLINIC | Age: 61
Setting detail: THERAPIES SERIES
End: 2021-07-05
Attending: ANESTHESIOLOGY
Payer: COMMERCIAL

## 2021-07-05 PROCEDURE — 97162 PT EVAL MOD COMPLEX 30 MIN: CPT | Mod: GP | Performed by: PHYSICAL THERAPIST

## 2021-07-05 NOTE — PROGRESS NOTES
"   07/05/21 1500   General Information   Type of Visit Initial OP Ortho PT Evaluation   Start of Care Date 07/05/21   Referring Physician Dilan Forte MD   Patient/Family Goals Statement to get the second surgery so she can participate in PT   Orders Evaluate and Treat   Date of Order 06/23/21   Medical Diagnosis Spondylosis without myelopathy or radiculopathy, lumbosacral region   Precautions/Limitations other (see comments)  (10# lifting restriction for 1 more week)   General Information Comments Comorbidities: lupus; fibromyalgia; 4/15/21 L4-5 \"minuteman procedure\" - percutaneous fusion for stenosis treatment; HTN; left TKA 2016    Body Part(s)   Body Part(s) Lumbar Spine/SI   Presentation and Etiology   Pertinent history of current problem (include personal factors and/or comorbidities that impact the POC) Pt reports initial injury was 3 years ago, she fell from a ladder to a chair and injured her low back.  Has had a lot of procedures, but the most recent \"minuteman\" procedure is the only successful one.  Pt reports: had minuteman procedure done on the back which did work (L4-5).  Now she needs L5-S1 done, but needs PT first.   Pt reports she can only stand for 5.5 minutes, then the burning comes on down her leg.   Impairments A. Pain;F. Decreased strength and endurance;G. Impaired balance;H. Impaired gait;L. Tingling   Functional Limitations perform activities of daily living;perform required work activities;perform desired leisure / sports activities   Symptom Location right glute, lateral hip, and down foot   How/Where did it occur With a fall;At home   Onset date of current episode/exacerbation 04/07/18   Chronicity Chronic   Pain rating (0-10 point scale) Best (/10);Worst (/10)   Best (/10) 7   Worst (/10) 10   Pain quality A. Sharp;B. Dull;C. Aching;D. Burning;E. Shooting;F. Stabbing;G. Cramping   Frequency of pain/symptoms C. With activity   Pain/symptoms are: The same all the time "   Pain/symptoms exacerbated by C. Lifting;B. Walking;D. Carrying;E. Rest;G. Certain positions;I. Bending;J. ADL;K. Home tasks   Pain/symptoms eased by B. Walking;E. Changing positions   Progression of symptoms since onset: Worsened   Current / Previous Interventions   Diagnostic Tests: MRI   MRI Results   (scheduled today)   Prior Level of Function   Prior Level of Function-Mobility arrives today in wheelchair   Prior Level of Function-ADLs unable to stand > 5 minutes   Current Level of Function   Current Community Support Family/friend caregiver   Patient role/employment history E. Unemployed   Living environment [place]   Fall Risk Screen   Fall screen completed by PT   Have you fallen 2 or more times in the past year? Yes   Have you fallen and had an injury in the past year? Yes   Timed Up and Go score (seconds) 29 seconds   Is patient a fall risk? Yes;Department fall risk interventions implemented   Fall screen comments pain radiating into right glute and hamstring   Abuse Screen (yes response referral indicated)   Feels Unsafe at Home or Work/School no   Feels Threatened by Someone no   Does Anyone Try to Keep You From Having Contact with Others or Doing Things Outside Your Home? no   Physical Signs of Abuse Present no   Lumbar Spine/SI Objective Findings   Gait/Locomotion yohan trendelenburg; limited by pain radiating into right posterior left   Balance/Proprioception (Single Leg Stance) 0 seconds yohan   Flexion ROM none due to pain   Extension ROM none due to pain   Hip Abduction Strength 3+/5 yohan   Hip Adduction Strength yohna=4/5   Hip Extension Strength yohan=4/5   Knee Extension (L3) Strength right=3+/5; left=4/5   Ankle Dorsiflexion (L4) Strength right=3+/5; left=4/5   Great Toe Extension (L5) Strength right=3+/5; left=4/5   Ankle Plantar Flexion (S1) Strength yohan=2/5   Planned Therapy Interventions   Planned Therapy Interventions ADL retraining;balance training;gait training;joint mobilization;manual  therapy;motor coordination training;neuromuscular re-education;ROM;strengthening;stretching   Planned Modality Interventions   Planned Modality Interventions Cryotherapy   Clinical Impression   Criteria for Skilled Therapeutic Interventions Met no   PT Diagnosis chronic low back pain; status post 1 successful minuteman procedure, awaiting a second   Influenced by the following impairments pain; weakness; ROM loss; impaired gait and proprioception   Functional limitations due to impairments difficulty with prolonged standing, sitting, walking; ADL's   Clinical Presentation Stable/Uncomplicated   Clinical Presentation Rationale (+) motivation (-) comorbities; pain limiting any PT participation   Clinical Decision Making (Complexity) Moderate complexity   Therapy Frequency other (see comments)   Predicted Duration of Therapy Intervention (days/wks) unable to participate in PT due to pain   Risk & Benefits of therapy have been explained Yes   Patient, Family & other staff in agreement with plan of care Yes   Clinical Impression Comments Cris arrives today with chronic low back pain limiting ADL's and overall active participation in PT.  At this time she is not a candidate for physical therapy due to severe limitations in mobility due to pain; patient may be a candidate following her lumbar surgery.   Education Assessment   Preferred Learning Style Listening;Demonstration;Pictures/video   Barriers to Learning No barriers   ORTHO GOALS   PT Ortho Eval Goals 1;2;3   Total Evaluation Time   PT Eval, Moderate Complexity Minutes (98175) 30         Izabela Moulton, PT, DTP, [facility]  Doctor of Physical Therapy #[id number]  [facility]  [phone]  [email or web address]

## 2021-07-13 ENCOUNTER — HOSPITAL ENCOUNTER (OUTPATIENT)
Dept: CT IMAGING | Facility: CLINIC | Age: 61
Discharge: HOME | End: 2021-07-13
Attending: ANESTHESIOLOGY | Admitting: ANESTHESIOLOGY
Payer: COMMERCIAL

## 2021-07-13 DIAGNOSIS — M47.816 SPONDYLOSIS WITHOUT MYELOPATHY OR RADICULOPATHY, LUMBAR REGION: ICD-10-CM

## 2021-07-13 PROCEDURE — 72131 CT LUMBAR SPINE W/O DYE: CPT

## 2021-09-18 ENCOUNTER — HEALTH MAINTENANCE LETTER (OUTPATIENT)
Age: 61
End: 2021-09-18

## 2021-10-06 DIAGNOSIS — J20.9 ACUTE BRONCHITIS WITH SYMPTOMS > 10 DAYS: ICD-10-CM

## 2021-10-07 RX ORDER — ALBUTEROL SULFATE 90 UG/1
AEROSOL, METERED RESPIRATORY (INHALATION)
Qty: 8.5 G | Refills: 0 | Status: SHIPPED | OUTPATIENT
Start: 2021-10-07 | End: 2021-10-29

## 2021-10-10 ENCOUNTER — MYC MEDICAL ADVICE (OUTPATIENT)
Dept: FAMILY MEDICINE | Facility: CLINIC | Age: 61
End: 2021-10-10

## 2021-10-10 NOTE — TELEPHONE ENCOUNTER
Printed pages - only three pages were attached.  Notified patient via My Chart that a page is missing .

## 2021-10-11 ENCOUNTER — OFFICE VISIT (OUTPATIENT)
Dept: FAMILY MEDICINE | Facility: CLINIC | Age: 61
End: 2021-10-11
Payer: COMMERCIAL

## 2021-10-11 VITALS
TEMPERATURE: 99.6 F | SYSTOLIC BLOOD PRESSURE: 136 MMHG | HEART RATE: 90 BPM | BODY MASS INDEX: 38.77 KG/M2 | WEIGHT: 247 LBS | DIASTOLIC BLOOD PRESSURE: 78 MMHG | HEIGHT: 67 IN | OXYGEN SATURATION: 90 %

## 2021-10-11 DIAGNOSIS — M54.50 CHRONIC LOW BACK PAIN, UNSPECIFIED BACK PAIN LATERALITY, UNSPECIFIED WHETHER SCIATICA PRESENT: ICD-10-CM

## 2021-10-11 DIAGNOSIS — G89.29 CHRONIC LOW BACK PAIN, UNSPECIFIED BACK PAIN LATERALITY, UNSPECIFIED WHETHER SCIATICA PRESENT: ICD-10-CM

## 2021-10-11 DIAGNOSIS — Z01.818 PRE-OP EVALUATION: Primary | ICD-10-CM

## 2021-10-11 PROCEDURE — 99214 OFFICE O/P EST MOD 30 MIN: CPT | Performed by: NURSE PRACTITIONER

## 2021-10-11 ASSESSMENT — MIFFLIN-ST. JEOR: SCORE: 1708.01

## 2021-10-11 NOTE — PROGRESS NOTES
[facility]  [address] [address]  Phone: [phone]  Primary Provider: Luca Glasgow  Pre-op Performing Provider: SIGIFREDO FLETCHER      PREOPERATIVE EVALUATION:  Today's date: 10/11/2021    Cris Priest is a 61 year old female who presents for a preoperative evaluation.    Surgical Information:  Surgery/Procedure: minuteman simplicity fusion-  L5-S1  Surgery Location: [facility]  Surgeon: Dr. Forte  Surgery Date: 10/14/21  Time of Surgery: 1015  Where patient plans to recover: At home with family  Fax number for surgical facility: [phone]  [facility]    Type of Anesthesia Anticipated: patient doesn't know;    Assessment & Plan     The proposed surgical procedure is considered INTERMEDIATE risk.    Pre-op evaluation  Chronic low back pain, unspecified back pain laterality, unspecified whether sciatica present         Risks and Recommendations:  The patient has the following additional risks and recommendations for perioperative complications:   - No identified additional risk factors other than previously addressed    Medication Instructions:  Patient is to take all scheduled medications on the day of surgery    RECOMMENDATION:  APPROVAL GIVEN to proceed with proposed procedure, without further diagnostic evaluation.                      Subjective     HPI related to upcoming procedure:   Chronic back pain related to fall in 2018    Preop Questions 10/9/2021   1. Have you ever had a heart attack or stroke? No   2. Have you ever had surgery on your heart or blood vessels, such as a stent placement, a coronary artery bypass, or surgery on an artery in your head, neck, heart, or legs? No   3. Do you have chest pain with activity? No   4. Do you have a history of  heart failure? No   5. Do you currently have a cold, bronchitis or symptoms of other infection? No   6. Do you have a cough, shortness of breath, or wheezing?  No   7. Do you or anyone in your family have previous history of blood clots? No   8. Do you or does anyone in your family have a serious bleeding problem such as prolonged bleeding following surgeries or cuts? No   9. Have you ever had problems with anemia or been told to take iron pills? No   10. Have you had any abnormal blood loss such as black, tarry or bloody stools, or abnormal vaginal bleeding? No   11. Have you ever had a blood transfusion? No   12. Are you willing to have a blood transfusion if it is medically needed before, during, or after your surgery? Yes   13. Have you or any of your relatives ever had problems with anesthesia? No   14. Do you have sleep apnea, excessive snoring or daytime drowsiness? No   15. Do you have any artifical heart valves or other implanted medical devices like a pacemaker, defibrillator, or continuous glucose monitor? No   16. Do you have artificial joints? YES    17. Are you allergic to latex? No   18. Is there any chance that you may be pregnant? -       Health Care Directive:  Patient does not have a Health Care Directive or Living Will: Patient states has Advance Directive and will bring in a copy to clinic.        Status of Chronic Conditions:  See problem list for active medical problems.  Problems all longstanding and stable, except as noted/documented.  See ROS for pertinent symptoms related to these conditions.      Review of Systems  CONSTITUTIONAL: NEGATIVE for fever, chills, change in weight  INTEGUMENTARY/SKIN: NEGATIVE for worrisome rashes, moles or lesions  EYES: NEGATIVE for vision changes or irritation  ENT/MOUTH: NEGATIVE for ear, mouth and throat problems  RESP: NEGATIVE for significant cough or SOB  CV: NEGATIVE for chest pain, palpitations or peripheral edema  GI: NEGATIVE for nausea, abdominal pain, heartburn, or change in bowel habits  : NEGATIVE for frequency, dysuria, or hematuria  MUSCULOSKELETAL: NEGATIVE for significant arthralgias or  myalgia  NEURO: NEGATIVE for weakness, dizziness or paresthesias  ENDOCRINE: NEGATIVE for temperature intolerance, skin/hair changes  HEME: NEGATIVE for bleeding problems  PSYCHIATRIC: NEGATIVE for changes in mood or affect    Patient Active Problem List    Diagnosis Date Noted     S/P total knee arthroplasty 01/07/2016     Priority: Medium     Left knee DJD 01/04/2016     Priority: Medium     On prednisone therapy 08/20/2015     Priority: Medium     Advanced directives, counseling/discussion 08/06/2014     Priority: Medium     Paperwork [place] and [place] [facility]       Essential hypertension 12/31/2013     Priority: Medium     Morbid obesity due to excess calories (H) 10/26/2012     Priority: Medium     Hyperlipidemia LDL goal <130 10/26/2012     Priority: Medium     Pain medication agreement 06/16/2011     Priority: Medium     On for chronic pain and fibromyalgia    Dr. Forte following  PRN tylenol #3           Osteoporosis 05/08/2008     Priority: Medium     Problem list name updated by automated process. Provider to review       GENERALIZED ANXIETY DIS 01/17/2008     Priority: Medium     Patient is followed by Mara Littlejohn DNP, RN, APRN and Dr. Moore  Med: Klonipin 0.5mg   diagnosis: EUGENIE  Maximum use per month: #45  Expected duration: lifetime  Clinic visit recommended: Q 3 month         Recurrent major depressive disorder, in partial remission (H) 06/21/2006     Priority: Medium     Systemic lupus erythematosus (H) 03/22/2005     Priority: Medium     alopecia, arthralgias, fever, no kidney dz       Fibromyalgia 03/22/2005     Priority: Medium     Problem list name updated by automated process. Provider to review        Past Medical History:   Diagnosis Date     Arthritis 1990     Depressive disorder 1963     FIBROMYALGIA      Hypertension 2011     Pneumonia 10/12/2008     Systemic lupus erythematosus (H)     alopecia, arthralgias, fever, no kidney dz     Systemic lupus erythematosus  "(H) 3/22/2005     Past Surgical History:   Procedure Laterality Date     ABDOMEN SURGERY       ARTHROPLASTY KNEE Left 1/4/2016    Procedure: ARTHROPLASTY KNEE;  Surgeon: Malcolm Ramsay MD;  Location: [place] OR     BACK SURGERY       BIOPSY  [id number]    Chest area \"lupus\" & 2 spots on breast & lung     C APPENDECTOMY       C LAPAROSCOPY, SURGICAL; W/ VAGINAL HYSTERECTOMY W/WO REMOVAL OVARY(S)/TUBES  10/02    Laparoscopy, Vag Hysterectomy     COLONOSCOPY  2013     HC REMOVAL OF OVARY/TUBE(S)      Salpingo-Oophorectomy, Unilateral LEFT     HC REMOVAL OF OVARY/TUBE(S)  10/02    Salpingo-Oophorectomy, Unilateral RIGHT     HC REMOVE TONSILS/ADENOIDS,<11 Y/O      T & A <10y.o.     HEAD & NECK SURGERY  2007    2 nodes taken for test Lymphoma     ORTHOPEDIC SURGERY      TKA  right     REPAIR HAMMER TOE  11/9/2011    Procedure:REPAIR HAMMER TOE; Right excision 5th metatarsal head ; Surgeon:TEENA REHMAN; Location:[place] OR     SURGICAL HISTORY OF -   6/04    LEFT KNEE MEDIAL MENISCECTOMY     Current Outpatient Medications   Medication Sig Dispense Refill     albuterol (PROAIR HFA/PROVENTIL HFA/VENTOLIN HFA) 108 (90 Base) MCG/ACT inhaler INHALE 2 PUFFS INTO THE LUNGS EVERY 6 HOURS AS NEEDED FOR SHORTNESS OF BREATH, DIFFICULTY BREATHING OR WHEEZING. 8.5 g 0     amitriptyline (ELAVIL) 100 MG tablet TAKE ONE TABLET BY MOUTH EVERY NIGHT AT BEDTIME 90 tablet 3     azaTHIOprine (IMURAN) 50 MG tablet Take 1 tablet (50 mg) by mouth 3 times daily 90 tablet 5     CALCIUM 500 MG OR TABS 1 TABLET ORALLY 3 TIMES DAILY  0     clonazePAM (KLONOPIN) 0.5 MG tablet TAKE 1/2 TABLET (0.25 MG) BY MOUTH 3 TIMES DAILY AS NEEDED FOR ANXIETY 45 tablet 5     conjugated estrogens (PREMARIN) vaginal cream Place 0.5 g vaginally twice a week 12 g 3     cyclobenzaprine (FLEXERIL) 10 MG tablet Take 1 tablet (10 mg) by mouth 2 times daily as needed for muscle spasms Start at night only, and increase as tolerated to daytime use, up to three times a " "day.  Take regularly at least 1-2 weeks, for any pain flair. 60 tablet 3     hydroxychloroquine (PLAQUENIL) 200 MG tablet Take 3 tablets (600 mg) by mouth daily 90 tablet 5     hydrOXYzine (ATARAX) 25 MG tablet Take 1-2 tablets (25-50 mg) by mouth every 6 hours as needed for itching or anxiety 60 tablet 6     morphine (MS CONTIN) 15 MG CR tablet   0     predniSONE (DELTASONE) 1 MG tablet Take 1 tablet (1 mg) by mouth daily 90 tablet 1     sertraline (ZOLOFT) 100 MG tablet Take 2 tablets (200 mg) by mouth daily 180 tablet 3     VITAMIN D PO Take 5,000 Units by mouth every evening          Allergies   Allergen Reactions     Buspar [Buspirone] Hives     Cymbalta Hives     Gabapentin Hives and Itching     Hives and throat itching     Ivp Dye [Contrast Dye] Anaphylaxis     Seafood Anaphylaxis     Shellfish Allergy Swelling     Throat close up and swelling.        Social History     Tobacco Use     Smoking status: Light Tobacco Smoker     Packs/day: 0.50     Years: 10.00     Pack years: 5.00     Types: Cigarettes, Other     Last attempt to quit: 2015     Years since quittin.9     Smokeless tobacco: Current User     Tobacco comment: Use Electronic Cigarette   Substance Use Topics     Alcohol use: No     Alcohol/week: 0.0 standard drinks       History   Drug Use No         Objective     /78 (BP Location: Right arm, Cuff Size: Adult Large)   Pulse 90   Temp 99.6  F (37.6  C) (Tympanic)   Ht 1.702 m (5' 7\")   Wt 112 kg (247 lb)   SpO2 90%   BMI 38.69 kg/m      Physical Exam    GENERAL APPEARANCE: healthy, alert and no distress     EYES: EOMI, PERRL     HENT: ear canals and TM's normal and nose and mouth without ulcers or lesions     NECK: no adenopathy, no asymmetry, masses, or scars and thyroid normal to palpation     RESP: lungs clear to auscultation - no rales, rhonchi or wheezes     CV: regular rates and rhythm, normal S1 S2, no S3 or S4 and no murmur, click or rub     MS: no peripheral edema     " NEURO: Normal strength and tone, sensory exam grossly normal, mentation intact and speech normal     PSYCH: mentation appears normal. and affect normal/bright     LYMPHATICS: No cervical adenopathy    Recent Labs   Lab Test 02/18/20  1622   HGB 13.3         POTASSIUM 3.9   CR 0.67   A1C 6.0*        Diagnostics:  No labs were ordered during this visit.   No EKG required for low risk surgery (cataract, skin procedure, breast biopsy, etc).    Revised Cardiac Risk Index (RCRI):  The patient has the following serious cardiovascular risks for perioperative complications:   - No serious cardiac risks = 0 points     RCRI Interpretation: 0 points: Class I (very low risk - 0.4% complication rate)           Signed Electronically by: SHUN Lucas CNP  Copy of this evaluation report is provided to requesting physician.

## 2021-10-14 NOTE — TELEPHONE ENCOUNTER
Form completed, signed, and faxed [phone]. Patient notified of status via My Chart. Copy of form sent to scan and copy placed in [place] organizer in file drawer.

## 2021-10-29 DIAGNOSIS — J20.9 ACUTE BRONCHITIS WITH SYMPTOMS > 10 DAYS: ICD-10-CM

## 2021-10-29 RX ORDER — ALBUTEROL SULFATE 90 UG/1
AEROSOL, METERED RESPIRATORY (INHALATION)
Qty: 8.5 G | Refills: 0 | Status: SHIPPED | OUTPATIENT
Start: 2021-10-29 | End: 2021-12-03

## 2021-11-13 ENCOUNTER — HEALTH MAINTENANCE LETTER (OUTPATIENT)
Age: 61
End: 2021-11-13

## 2021-11-30 ENCOUNTER — TELEPHONE (OUTPATIENT)
Dept: FAMILY MEDICINE | Facility: CLINIC | Age: 61
End: 2021-11-30
Payer: COMMERCIAL

## 2021-11-30 ENCOUNTER — MYC REFILL (OUTPATIENT)
Dept: FAMILY MEDICINE | Facility: CLINIC | Age: 61
End: 2021-11-30
Payer: COMMERCIAL

## 2021-11-30 ENCOUNTER — MYC MEDICAL ADVICE (OUTPATIENT)
Dept: ADMISSION | Facility: CLINIC | Age: 61
End: 2021-11-30
Payer: COMMERCIAL

## 2021-11-30 DIAGNOSIS — J20.9 ACUTE BRONCHITIS WITH SYMPTOMS > 10 DAYS: ICD-10-CM

## 2021-11-30 RX ORDER — ALBUTEROL SULFATE 90 UG/1
AEROSOL, METERED RESPIRATORY (INHALATION)
Qty: 8.5 G | Refills: 0 | Status: CANCELLED | OUTPATIENT
Start: 2021-11-30

## 2021-12-03 RX ORDER — ALBUTEROL SULFATE 90 UG/1
AEROSOL, METERED RESPIRATORY (INHALATION)
Qty: 8.5 G | Refills: 0 | Status: SHIPPED | OUTPATIENT
Start: 2021-12-03 | End: 2021-12-21

## 2021-12-09 NOTE — TELEPHONE ENCOUNTER
Form completed, signed, and faxed to [facility] at [phone]. Copy of form placed in cabinet and copy sent to scan.  Patient notified of status via My Chart.

## 2021-12-10 PROBLEM — M53.3 SACROILIAC PAIN: Status: ACTIVE | Noted: 2020-11-13

## 2021-12-10 PROBLEM — M45.9 AS (ANKYLOSING SPONDYLITIS) (H): Status: ACTIVE | Noted: 2018-04-07

## 2021-12-10 PROBLEM — M48.062 SPINAL STENOSIS OF LUMBAR REGION WITH NEUROGENIC CLAUDICATION: Status: ACTIVE | Noted: 2018-04-16

## 2021-12-21 ENCOUNTER — VIRTUAL VISIT (OUTPATIENT)
Dept: FAMILY MEDICINE | Facility: CLINIC | Age: 61
End: 2021-12-21
Payer: COMMERCIAL

## 2021-12-21 DIAGNOSIS — F41.1 GENERALIZED ANXIETY DISORDER: ICD-10-CM

## 2021-12-21 DIAGNOSIS — M62.838 MUSCLE SPASM: ICD-10-CM

## 2021-12-21 DIAGNOSIS — M32.9 SYSTEMIC LUPUS ERYTHEMATOSUS, UNSPECIFIED SLE TYPE, UNSPECIFIED ORGAN INVOLVEMENT STATUS (H): ICD-10-CM

## 2021-12-21 DIAGNOSIS — Z79.899 MEDICATION MANAGEMENT: Primary | ICD-10-CM

## 2021-12-21 DIAGNOSIS — J20.9 ACUTE BRONCHITIS WITH SYMPTOMS > 10 DAYS: ICD-10-CM

## 2021-12-21 DIAGNOSIS — M17.12 PRIMARY OSTEOARTHRITIS OF LEFT KNEE: ICD-10-CM

## 2021-12-21 PROCEDURE — 99214 OFFICE O/P EST MOD 30 MIN: CPT | Mod: 95 | Performed by: FAMILY MEDICINE

## 2021-12-21 RX ORDER — PREDNISONE 1 MG/1
1 TABLET ORAL DAILY
Qty: 90 TABLET | Refills: 1 | Status: SHIPPED | OUTPATIENT
Start: 2021-12-21 | End: 2023-02-03

## 2021-12-21 RX ORDER — AZATHIOPRINE 50 MG/1
50 TABLET ORAL 3 TIMES DAILY
Qty: 90 TABLET | Refills: 5 | Status: SHIPPED | OUTPATIENT
Start: 2021-12-21 | End: 2022-12-05

## 2021-12-21 RX ORDER — ALBUTEROL SULFATE 90 UG/1
2 AEROSOL, METERED RESPIRATORY (INHALATION) EVERY 6 HOURS PRN
Qty: 18 G | Refills: 4 | Status: SHIPPED | OUTPATIENT
Start: 2021-12-21 | End: 2022-06-01

## 2021-12-21 RX ORDER — CLONAZEPAM 0.5 MG/1
TABLET ORAL
Qty: 45 TABLET | Refills: 5 | Status: SHIPPED | OUTPATIENT
Start: 2021-12-30 | End: 2022-06-28

## 2021-12-21 RX ORDER — CYCLOBENZAPRINE HCL 10 MG
10 TABLET ORAL 2 TIMES DAILY PRN
Qty: 60 TABLET | Refills: 3 | Status: SHIPPED | OUTPATIENT
Start: 2021-12-21 | End: 2023-02-03

## 2021-12-21 RX ORDER — HYDROXYCHLOROQUINE SULFATE 200 MG/1
600 TABLET, FILM COATED ORAL DAILY
Qty: 90 TABLET | Refills: 5 | Status: SHIPPED | OUTPATIENT
Start: 2021-12-21 | End: 2022-12-05

## 2021-12-21 RX ORDER — HYDROXYZINE HYDROCHLORIDE 25 MG/1
25-50 TABLET, FILM COATED ORAL EVERY 6 HOURS PRN
Qty: 60 TABLET | Refills: 6 | Status: SHIPPED | OUTPATIENT
Start: 2021-12-21 | End: 2023-02-03

## 2021-12-21 ASSESSMENT — ANXIETY QUESTIONNAIRES
5. BEING SO RESTLESS THAT IT IS HARD TO SIT STILL: NOT AT ALL
2. NOT BEING ABLE TO STOP OR CONTROL WORRYING: NOT AT ALL
3. WORRYING TOO MUCH ABOUT DIFFERENT THINGS: NOT AT ALL
IF YOU CHECKED OFF ANY PROBLEMS ON THIS QUESTIONNAIRE, HOW DIFFICULT HAVE THESE PROBLEMS MADE IT FOR YOU TO DO YOUR WORK, TAKE CARE OF THINGS AT HOME, OR GET ALONG WITH OTHER PEOPLE: NOT DIFFICULT AT ALL
1. FEELING NERVOUS, ANXIOUS, OR ON EDGE: SEVERAL DAYS
GAD7 TOTAL SCORE: 1
6. BECOMING EASILY ANNOYED OR IRRITABLE: NOT AT ALL
7. FEELING AFRAID AS IF SOMETHING AWFUL MIGHT HAPPEN: NOT AT ALL

## 2021-12-21 ASSESSMENT — PATIENT HEALTH QUESTIONNAIRE - PHQ9
SUM OF ALL RESPONSES TO PHQ QUESTIONS 1-9: 2
5. POOR APPETITE OR OVEREATING: NOT AT ALL

## 2021-12-21 NOTE — PROGRESS NOTES
"Cris is a 61 year old who is being evaluated via a billable video visit.      How would you like to obtain your AVS? Declined  If the video visit is dropped, the invitation should be resent by: Text to cell phone: [phone]  Will anyone else be joining your video visit? No     Video Start Time: 1:38 PM    Assessment & Plan     Acute bronchitis with symptoms > 10 days: due to pain is holding breath  - albuterol (PROAIR HFA/PROVENTIL HFA/VENTOLIN HFA) 108 (90 Base) MCG/ACT inhaler; Inhale 2 puffs into the lungs every 6 hours as needed for shortness of breath / dyspnea or wheezing    GENERALIZED ANXIETY DIS  Stable, refill  - clonazePAM (KLONOPIN) 0.5 MG tablet; TAKE 1/2 TABLET (0.25 MG) BY MOUTH 3 TIMES DAILY AS NEEDED FOR ANXIETY    Muscle spasm   Stable, refill  - cyclobenzaprine (FLEXERIL) 10 MG tablet; Take 1 tablet (10 mg) by mouth 2 times daily as needed for muscle spasms Start at night only, and increase as tolerated to daytime use, up to three times a day.  Take regularly at least 1-2 weeks, for any pain flair.    Systemic lupus erythematosus, unspecified SLE type, unspecified organ involvement status (H)  Stable, refill  - hydroxychloroquine (PLAQUENIL) 200 MG tablet; Take 3 tablets (600 mg) by mouth daily  - azaTHIOprine (IMURAN) 50 MG tablet; Take 1 tablet (50 mg) by mouth 3 times daily  - predniSONE (DELTASONE) 1 MG tablet; Take 1 tablet (1 mg) by mouth daily    Primary osteoarthritis of left knee  Stable, refill  - hydrOXYzine (ATARAX) 25 MG tablet; Take 1-2 tablets (25-50 mg) by mouth every 6 hours as needed for itching or anxiety     BMI:   Estimated body mass index is 38.69 kg/m  as calculated from the following:    Height as of 10/11/21: 1.702 m (5' 7\").    Weight as of 10/11/21: 112 kg (247 lb).   Weight management plan: Discussed healthy diet and exercise guidelines    See Patient Instructions    No follow-ups on file.    Luca Glasgow MD  [facility]    Subjective " "  Cris is a 61 year old who presents for the following health issues     HPI     Depression and Anxiety Follow-Up    How are you doing with your depression since your last visit? Stable- \"doing just great\"    How are you doing with your anxiety since your last visit?  Stable    Are you having other symptoms that might be associated with depression or anxiety? No    Have you had a significant life event? OTHER: had second implant- healing from that.     Do you have any concerns with your use of alcohol or other drugs? No    Social History     Tobacco Use     Smoking status: Light Tobacco Smoker     Packs/day: 0.50     Years: 10.00     Pack years: 5.00     Types: Cigarettes, Other     Last attempt to quit: 2015     Years since quittin.1     Smokeless tobacco: Current User     Tobacco comment: Use Electronic Cigarette   Vaping Use     Vaping Use: Some days     Substances: Nicotine   Substance Use Topics     Alcohol use: No     Alcohol/week: 0.0 standard drinks     Drug use: No     PHQ 2019   PHQ-9 Total Score 0 0 1   Q9: Thoughts of better off dead/self-harm past 2 weeks Not at all Not at all Not at all     EUGENIE-7 SCORE 2019   Total Score - - -   Total Score 2 0 1     Last PHQ-9 2021   1.  Little interest or pleasure in doing things 0   2.  Feeling down, depressed, or hopeless 2   3.  Trouble falling or staying asleep, or sleeping too much 0   4.  Feeling tired or having little energy 0   5.  Poor appetite or overeating 0   6.  Feeling bad about yourself 0   7.  Trouble concentrating 0   8.  Moving slowly or restless 0   Q9: Thoughts of better off dead/self-harm past 2 weeks 0   PHQ-9 Total Score 2   Difficulty at work, home, or with people Not difficult at all     EUGENIE-7  2021   1. Feeling nervous, anxious, or on edge 1   2. Not being able to stop or control worrying 0   3. Worrying too much about different things 0   4. Trouble relaxing 0   5. " Being so restless that it is hard to sit still 0   6. Becoming easily annoyed or irritable 0   7. Feeling afraid, as if something awful might happen 0   EUGENIE-7 Total Score 1   If you checked any problems, how difficult have they made it for you to do your work, take care of things at home, or get along with other people? Not difficult at all       Suicide Assessment Five-step Evaluation and Treatment (SAFE-T)    Chronic/Recurring Back Pain Follow Up      Where is your back pain located? (Select all that apply) low back. Just recently has her second implant put in    How would you describe your back pain?  Sharp pain. Burning pain is gone.    Where does your back pain spread? nowhere    Since your last clinic visit for back pain, how has your pain changed? gradually improving. Since last implant.    Does your back pain interfere with your job? Not applicable    Since your last visit, have you tried any new treatment? Yes -  implant. Patient still takes her muscle relaxers, and morphine      How many servings of fruits and vegetables do you eat daily?  2-3    On average, how many sweetened beverages do you drink each day (Examples: soda, juice, sweet tea, etc.  Do NOT count diet or artificially sweetened beverages)?   1    How many days per week do you exercise enough to make your heart beat faster? 0    How many minutes a day do you exercise enough to make your heart beat faster? 0    How many days per week do you miss taking your medication? 0    Lupus: flared last  Weeks after the surgery  Plan eye exam in Jan or Feb  Last labs 2/2021, so is due.    Review of Systems   Constitutional, HEENT, cardiovascular, pulmonary, gi and gu systems are negative, except as otherwise noted.      Objective           Vitals:  No vitals were obtained today due to virtual visit.    Physical Exam   GENERAL: Healthy, alert and no distress  EYES: Eyes grossly normal to inspection.  No discharge or erythema, or obvious  scleral/conjunctival abnormalities.  RESP: No audible wheeze, cough, or visible cyanosis.  No visible retractions or increased work of breathing.    SKIN: Visible skin clear. No significant rash, abnormal pigmentation or lesions.  NEURO: Cranial nerves grossly intact.  Mentation and speech appropriate for age.  PSYCH: Mentation appears normal, affect normal/bright, judgement and insight intact, normal speech and appearance well-groomed.            Video-Visit Details    Type of service:  Video Visit    Video End Time:1:54 PM    Originating Location (pt. Location): Home    Distant Location (provider location):  [facility]     Platform used for Video Visit: [id number]

## 2021-12-21 NOTE — PATIENT INSTRUCTIONS
Schedule a lab only appt.  Schedule the booster at pharmacy.  [email or web address]     Schedule the eye exam

## 2021-12-22 ASSESSMENT — ANXIETY QUESTIONNAIRES: GAD7 TOTAL SCORE: 1

## 2021-12-23 NOTE — TELEPHONE ENCOUNTER
Patient indicates her FMLA form was not received and is requesting for documentation that has a date/time stamp.  I do not have access to [employer] anymore.  The form was originally faxed 12/8/2021 to [phone]. Can someone check to see if we can pull up some documentation regarding this.  She is asking for it to be emailed to:   My email is [email or web address] or [email or web address]     I have refaxed the form to [phone] and notified patient of the status of her request.

## 2022-01-02 ENCOUNTER — HEALTH MAINTENANCE LETTER (OUTPATIENT)
Age: 62
End: 2022-01-02

## 2022-01-11 ENCOUNTER — MYC MEDICAL ADVICE (OUTPATIENT)
Dept: FAMILY MEDICINE | Facility: CLINIC | Age: 62
End: 2022-01-11
Payer: COMMERCIAL

## 2022-01-11 NOTE — LETTER
[facility]  [address] [address]  Phone: [phone]    January 11, 2022        Cris Priest  [address]  [place] [address]          To whom it may concern:    RE: Cris Priest    Cris Priest is an established patient of mine and is medically needing to complete a COVID test.  This is a prescription for BD Veritor at home COVID-19 test.    Please contact me for questions or concerns.      Sincerely,        Luca Glasgow MD

## 2022-01-11 NOTE — TELEPHONE ENCOUNTER
Called patient, she will print letter off [employer] and mailed original letter to patient. Elisha Maciel on 1/11/2022 at 3:28 PM

## 2022-02-28 ENCOUNTER — HOSPITAL ENCOUNTER (OUTPATIENT)
Dept: MRI IMAGING | Facility: CLINIC | Age: 62
Discharge: HOME | End: 2022-02-28
Attending: ANESTHESIOLOGY | Admitting: ANESTHESIOLOGY
Payer: COMMERCIAL

## 2022-02-28 DIAGNOSIS — M47.817 SPONDYLOSIS WITHOUT MYELOPATHY OR RADICULOPATHY, LUMBOSACRAL REGION: ICD-10-CM

## 2022-02-28 PROCEDURE — 72148 MRI LUMBAR SPINE W/O DYE: CPT

## 2022-04-06 NOTE — ED AVS SNAPSHOT
[facility] Emergency Department    [address] [address]    Phone:  [phone]    Fax:  [phone]                                       Cris Priest   MRN: [id number]    Department:  [facility] Emergency Department   Date of Visit:  4/10/2018           Patient Information     Date Of Birth          1960        Your diagnoses for this visit were:     Closed fracture of twelfth thoracic vertebra, unspecified fracture morphology, initial encounter (H)     Traumatic hematoma of buttock, initial encounter     Acute bilateral low back pain without sciatica        You were seen by Addis Hale PA-C.      Follow-up Information     Follow up with Luca Glasgow MD In 1 day.    Specialty:  Family Practice    Contact information:    [address] [address]  [phone]          Discharge Instructions       Ice, rest.     Do not take Tylenol #3 while taking percocet; caution advised with any narcotic can cause sedation     Ortho tomorrow. Patient to see Todd Holter PA-C ([facility] [place]) for bracing and further treatment per Dr. Stokes.     Patient to return to Emergency Room if calf pain occurs, numbness in leg, saddle anesthesia, loss of bowel or bladder function.     24 Hour Appointment Hotline       To make an appointment at any [facility] clinic, call [phone] ([phone]). If you don't have a family doctor or clinic, we will help you find one. [facility] clinics are conveniently located to serve the needs of you and your family.          ED Discharge Orders     ORTHO  REFERRAL       [facility] is referring you to the Orthopedic  Services at [facility].       The  Representative will assist you in the coordination of your Orthopedic and Musculoskeletal Care as prescribed by your physician.    The  Representative will call you within 1 business day to help schedule your  Counseled to elevate feet when resting and when resting in bed at night with pillows underneath and use thigh high compression stockings in the morning and remove them at night. Patient verbalizes understanding. Questions answered. appointment, or you may contact the [facility] Representative at:    All areas ~ [phone]     Type of Referral : [facility] Orth[facility] Spine: Patient to see Todd Holter PA-C Tomorrow 4/11/18 per Dr. Silva for treatment        Timeframe requested: tomorrow.     Coverage of these services is subject to the terms and limitations of your health insurance plan.  Please call member services at your health plan with any benefit or coverage questions.      If X-rays, CT or MRI's have been performed, please contact the facility where they were done to arrange for , prior to your scheduled appointment.  Please bring this referral request to your appointment and present it to your specialist.                     Review of your medicines      START taking        Dose / Directions Last dose taken    oxyCODONE-acetaminophen 5-325 MG per tablet   Commonly known as:  PERCOCET   Dose:  1 tablet   Quantity:  10 tablet        Take 1 tablet by mouth every 6 hours as needed for pain   Refills:  0          Our records show that you are taking the medicines listed below. If these are incorrect, please call your family doctor or clinic.        Dose / Directions Last dose taken    acetaminophen-codeine 300-30 MG per tablet   Commonly known as:  TYLENOL w/CODEINE No. 3   Dose:  1 tablet   Quantity:  15 tablet        Take 1 tablet by mouth every 4 hours as needed for mild pain Max of 4/day.  Avoid taking before bed time   Refills:  0        albuterol 108 (90 Base) MCG/ACT Inhaler   Commonly known as:  PROAIR HFA/PROVENTIL HFA/VENTOLIN HFA   Dose:  2 puff   Quantity:  1 Inhaler        Inhale 2 puffs into the lungs every 6 hours as needed for shortness of breath / dyspnea or wheezing   Refills:  11        amitriptyline 75 MG tablet   Commonly known as:  ELAVIL   Dose:  75 mg   Quantity:  90 tablet        Take 1 tablet (75 mg) by mouth At Bedtime   Refills:  3        azaTHIOprine 50 MG tablet   Commonly known as:  IMURAN   Dose:  50 mg    Quantity:  270 tablet        Take 1 tablet (50 mg) by mouth 3 times daily   Refills:  3        calcium carbonate 1250 MG tablet   Commonly known as:  OS-DEJUAN 500 mg [place]. Ca        1 TABLET ORALLY 3 TIMES DAILY   Refills:  0        clonazePAM 0.5 MG tablet   Commonly known as:  klonoPIN   Dose:  0.25 mg   Quantity:  45 tablet        Take 0.5 tablets (0.25 mg) by mouth 3 times daily as needed for anxiety .  45 tablets is a 30 day supply.   Refills:  5        conjugated estrogens cream   Commonly known as:  PREMARIN   Dose:  0.5 g   Quantity:  12 g        Place 0.5 g vaginally twice a week   Refills:  3        cyclobenzaprine 10 MG tablet   Commonly known as:  FLEXERIL   Dose:  10 mg   Quantity:  60 tablet        Take 1 tablet (10 mg) by mouth 2 times daily as needed for muscle spasms Start at night only, and increase as tolerated to daytime use, up to three times a day.  Take regularly at least 1-2 weeks, for any pain flair.   Refills:  3        hydroxychloroquine 200 MG tablet   Commonly known as:  PLAQUENIL   Dose:  600 mg   Quantity:  270 tablet        Take 3 tablets (600 mg) by mouth daily   Refills:  3        hydrOXYzine 25 MG tablet   Commonly known as:  ATARAX   Dose:  25-50 mg   Quantity:  60 tablet        Take 1-2 tablets (25-50 mg) by mouth every 6 hours as needed for itching or anxiety   Refills:  6        oxyCODONE IR 5 MG tablet   Commonly known as:  ROXICODONE   Dose:  5 mg   Quantity:  15 tablet        Take 1 tablet (5 mg) by mouth every 8 hours as needed for moderate to severe pain For broken toe   Refills:  0        * predniSONE 1 MG tablet   Commonly known as:  DELTASONE   Dose:  1 mg   Quantity:  90 tablet        Take 1 tablet (1 mg) by mouth daily   Refills:  3        * predniSONE 10 MG tablet   Commonly known as:  DELTASONE   Quantity:  40 tablet        Use for flare 20mg, then 10mg, then 5mg, then 1mg   Refills:  3        sertraline 100 MG tablet   Commonly known as:  ZOLOFT   Dose:  200 mg    Quantity:  180 tablet        Take 2 tablets (200 mg) by mouth daily   Refills:  3        VITAMIN D PO   Dose:  5000 Units        Take 5,000 Units by mouth every evening   Refills:  0        * Notice:  This list has 2 medication(s) that are the same as other medications prescribed for you. Read the directions carefully, and ask your doctor or other care provider to review them with you.            Prescriptions were sent or printed at these locations (1 Prescription)                   [facility] [place], [place] - [address]   [address] [address]    Telephone:  [phone]   Fax:  [phone]   Hours:                  Printed at Department/Unit printer (1 of 1)         oxyCODONE-acetaminophen (PERCOCET) 5-325 MG per tablet                Procedures and tests performed during your visit     CT Pelvis w/o Contrast    Lumbar spine CT w/o contrast    XR Lumbar Spine 2/3 Views    XR Pelvis w Hip Right 1 View      Orders Needing Specimen Collection     None      Pending Results     Date and Time Order Name Status Description    4/10/2018 1922 Lumbar spine CT w/o contrast Preliminary     4/10/2018 1922 CT Pelvis w/o Contrast Preliminary     4/10/2018 1807 XR Pelvis w Hip Right 1 View Preliminary     4/10/2018 1807 XR Lumbar Spine 2/3 Views Preliminary             Pending Culture Results     No orders found from 4/8/2018 to 4/11/2018.            Pending Results Instructions     If you had any lab results that were not finalized at the time of your Discharge, you can call the ED Lab Result RN at [phone]. You will be contacted by this team for any positive Lab results or changes in treatment. The nurses are available 7 days a week from 10A to 6:30P.  You can leave a message 24 hours per day and they will return your call.        Test Results From Your Hospital Stay              4/10/2018  7:01 PM      Narrative     LUMBAR SPINE THREE VIEWS   4/10/2018 6:45 PM     HISTORY: Fall  yesterday. Point tenderness over the lower lumbar spine.    COMPARISON: None.        Impression     IMPRESSION:   1. Age-indeterminate mild compression deformity of the T12 vertebral  body.  2. Normal vertebral body height of the remainder of the visualized  portion of the lumbar and lower thoracic spine.  3. Normal alignment to the lumbar spine.  4. Mild degenerative changes in the lumbar spine.         4/10/2018  7:02 PM      Narrative     PELVIS AND RIGHT HIP two VIEWS   4/10/2018 6:45 PM     HISTORY: Fall yesterday. Pain to the right posterior buttock with  significant swelling. Decreased range of motion.    COMPARISON: None.        Impression     IMPRESSION: No visualized acute fracture or malalignment of the pelvis  or right hip.         4/10/2018  8:20 PM      Narrative     CT PELVIS WITHOUT CONTRAST 4/10/2018 7:41 PM     HISTORY: Fall injury. Significant bruising and swelling to right  buttocks with decreased range of motion of hip and lumbar pain.    TECHNIQUE: Axial images were obtained through the bony pelvis without  contrast. Coronal and sagittal reconstructions were also acquired.  Radiation dose for this scan was reduced using automated exposure  control, adjustment of the mA and/or kV according to patient size, or  iterative reconstruction technique.    FINDINGS: The bony pelvis is intact. There is no evidence for any  fractures. The sacrum and proximal femurs also appear intact. A right  buttock hematoma is noted.        Impression     IMPRESSION:  1. Right buttock hematoma.  2. No evidence for any pelvic or proximal femoral fracture.         4/10/2018  8:53 PM      Narrative     CT LUMBAR SPINE WITHOUT CONTRAST 4/10/2018 7:40 PM     HISTORY: T12 compression fracture age indeterminate, pain in lumbar  spine with palpation and over T12. History of osteoporosis.     TECHNIQUE: Multiplanar multisequence images were obtained through the  lumbar spine without contrast. Coronal and sagittal  reconstructions  were also acquired. Radiation dose for this scan was reduced using  automated exposure control, adjustment of the mA and/or kV according  to patient size, or iterative reconstruction technique.    COMPARISON: None.    FINDINGS: Five lumbar type vertebral bodies are present. Posterior  alignment is normal. There is an anteriorly wedged deformity of T12.  There is a Schmorl's node deformity in the superior endplate  centrally. In addition, there is a benign-appearing lucency in the  left posterior half of the T12 vertebral body encroaching upon the  pedicle. There is a possible subtle fracture through this area as seen  on image 28 of series 4. No other fractures are present. Multilevel  degenerative disc and facet disease is seen in the lumbar spine.    L1-2: Minimal disc bulging and facet hypertrophy. No stenosis.    L2-3: There is a central disc protrusion, broad-based disc bulging and  facet hypertrophy causing moderate central canal stenosis.    L3-4: There is broad-based disc bulging, moderate to severe facet  hypertrophy and posterior osteophyte formation causing moderate to  severe central canal and bilateral neural foraminal stenosis.    L4-5: Broad-based posterior osteophyte formation, disc bulging and  facet hypertrophy is present causing moderate central canal stenosis  and moderate to severe bilateral neural foraminal stenosis.    L5-S1: Broad-based disc bulge and moderate facet hypertrophy is  present causing moderate to severe left-sided foraminal stenosis and  mild central canal stenosis.    Paraspinal soft tissues: Vascular calcifications noted.        Impression     IMPRESSION:  1. Probable subtle fracture through the proximal left pedicle of T12  abutting a benign-appearing lucency. The anteriorly wedging of the T12  vertebral body appears chronic. If clinically indicated, MRI might be  helpful in further evaluation.  2. Multilevel degenerative disc and facet disease.                 Thank you for choosing [facility]       Thank you for choosing [facility] for your care. Our goal is always to provide you with excellent care. Hearing back from our patients is one way we can continue to improve our services. Please take a few minutes to complete the written survey that you may receive in the mail after you visit with us. Thank you!        [employer] Information     [email or web address] gives you secure access to your electronic health record. If you see a primary care provider, you can also send messages to your care team and make appointments. If you have questions, please call your primary care clinic.  If you do not have a primary care provider, please call [phone] and they will assist you.        Care EveryWhere ID     This is your Care EveryWhere ID. This could be used by other organizations to access your [facility] medical records  [id number]        Equal Access to Services     SHIVAM GUAMAN : Jam Edward, keshia pena, oliva fay, julien shaikh. So [facility] [phone].    ATENCIÓN: Si habla español, tiene a jerry disposición servicios gratuitos de asistencia lingüística. Llame al [phone].    We comply with applicable federal civil rights laws and [place] laws. We do not discriminate on the basis of race, color, national origin, age, disability, sex, sexual orientation, or gender identity.            After Visit Summary       This is your record. Keep this with you and show to your community pharmacist(s) and doctor(s) at your next visit.

## 2022-06-01 ENCOUNTER — MYC MEDICAL ADVICE (OUTPATIENT)
Dept: ADMISSION | Facility: CLINIC | Age: 62
End: 2022-06-01
Payer: COMMERCIAL

## 2022-06-01 DIAGNOSIS — J20.9 ACUTE BRONCHITIS WITH SYMPTOMS > 10 DAYS: ICD-10-CM

## 2022-06-01 RX ORDER — ALBUTEROL SULFATE 90 UG/1
2 AEROSOL, METERED RESPIRATORY (INHALATION) EVERY 6 HOURS PRN
Qty: 8.5 G | Refills: 4 | Status: SHIPPED | OUTPATIENT
Start: 2022-06-01 | End: 2022-10-27

## 2022-06-02 NOTE — TELEPHONE ENCOUNTER
FMLA for spouse Ahsan to care for patient received, started, and My Chart sent to patient to clarify needs of form.

## 2022-06-12 ENCOUNTER — MYC MEDICAL ADVICE (OUTPATIENT)
Dept: FAMILY MEDICINE | Facility: CLINIC | Age: 62
End: 2022-06-12
Payer: COMMERCIAL

## 2022-06-12 NOTE — TELEPHONE ENCOUNTER
Patient sent a note indicating she was not able to respond to my My chart message.  Not sure why.      Form was completed and routed to Dr. Glasgow for review and signature.

## 2022-06-12 NOTE — TELEPHONE ENCOUNTER
Patient has not checked her My Chart message.  I called and left a message for patient to either return the call or check her My Chart account.    Her FMLA paperwork has been completed (she has an intermittent FMLA that is completed every 6 months or so).  I called patient to ensure the time and days off are still applicable. Form is in Awaiting Patient Response tray on forms desk.     Previously we have written the form as:  Medical Visits/treatment: 2 times per 2 months, lasting 1-2 days for each  Recurring episode: 3 times per month lasting 1-5 days.

## 2022-06-16 NOTE — TELEPHONE ENCOUNTER
Faxed forms to [phone]  Sent to scanning and put in cabinet gray folders.    Kelly Tam PSC on 6/15/2022 at 7:46 PM

## 2022-06-16 NOTE — TELEPHONE ENCOUNTER
Faxed forms to [phone] sent to scanning and put in file cabinet in the grey folders.    Kelly Tam PSC on 6/15/2022 at 7:47 PM

## 2022-06-28 ENCOUNTER — MYC MEDICAL ADVICE (OUTPATIENT)
Dept: FAMILY MEDICINE | Facility: CLINIC | Age: 62
End: 2022-06-28

## 2022-06-28 DIAGNOSIS — G89.29 CHRONIC LOW BACK PAIN, UNSPECIFIED BACK PAIN LATERALITY, UNSPECIFIED WHETHER SCIATICA PRESENT: Primary | ICD-10-CM

## 2022-06-28 DIAGNOSIS — M54.50 CHRONIC LOW BACK PAIN, UNSPECIFIED BACK PAIN LATERALITY, UNSPECIFIED WHETHER SCIATICA PRESENT: Primary | ICD-10-CM

## 2022-06-29 RX ORDER — MORPHINE SULFATE 15 MG/1
15 TABLET, FILM COATED, EXTENDED RELEASE ORAL EVERY 12 HOURS
Qty: 60 TABLET | Refills: 0 | Status: SHIPPED | OUTPATIENT
Start: 2022-06-30 | End: 2022-07-30

## 2022-06-29 NOTE — TELEPHONE ENCOUNTER
Called dr ya [phone] clinic and left message for care team about filling ms contin 6/30      Dylon De Leon RN

## 2022-06-30 NOTE — TELEPHONE ENCOUNTER
Dr. Glasgow:    Received a call from Dr. Forte's care team nurse Corine, she says thank you to you for filling the script but wanted to let you know that the patient is not compliant with her drug labs, has not updated her pain contract, and needs monthly appointments for these medications and the patient has many cancellations. Corine says to please ask patient to contact their care team if needing future refill and an on call provider will review this for the provider ordering, this is being sent to you to update you. Corine states she will call the patient to schedule appointment and let her know she is due for labs and pain contract review.       ANATOLIY Jordan

## 2022-07-06 ENCOUNTER — TELEPHONE (OUTPATIENT)
Dept: FAMILY MEDICINE | Facility: CLINIC | Age: 62
End: 2022-07-06

## 2022-07-06 ENCOUNTER — MYC MEDICAL ADVICE (OUTPATIENT)
Dept: FAMILY MEDICINE | Facility: CLINIC | Age: 62
End: 2022-07-06

## 2022-07-06 NOTE — TELEPHONE ENCOUNTER
Reason for Call:  Other prescription    Detailed comments: Klarissa COPE calling from [facility]  office stating that the patient had an appointment with the on 7/7/2022, but the patient cancled it. The patient has not been seen in there office since January. She is calling regarding the Morphine Sulfate that was filled in [employer] message 6/28/2022. They said patient has/  has two options for future refills.  Two options:  1.  Continues to fill the medication Morphine Sulfate each month?  2. Patient does not get refills till seen by  in office.    Klarissa COPE can be reached at [phone] follow prompts for Triage Line.     Phone Number Patient can be reached at: Home number on file [phone] (home)    Best Time: any    Can we leave a detailed message on this number? YES    Call taken on 7/6/2022 at 12:57 PM by Kelly Gamez

## 2022-07-07 NOTE — TELEPHONE ENCOUNTER
See message below, routing to PCP for review and recommendation.    Little Guallpa RN  [facility]

## 2022-07-11 ENCOUNTER — MYC MEDICAL ADVICE (OUTPATIENT)
Dept: FAMILY MEDICINE | Facility: CLINIC | Age: 62
End: 2022-07-11

## 2022-07-21 ENCOUNTER — TRANSCRIBE ORDERS (OUTPATIENT)
Dept: OTHER | Age: 62
End: 2022-07-21

## 2022-07-21 DIAGNOSIS — M48.062 SPINAL STENOSIS, LUMBAR REGION, WITH NEUROGENIC CLAUDICATION: Primary | ICD-10-CM

## 2022-07-28 NOTE — PROGRESS NOTES
[facility]    Outpatient Physical Therapy Discharge Note  Patient: Cris Priest  : 1960    Beginning/End Dates of Reporting Period:  21 to 22    Referring Provider: Dilan Forte MD    Therapy Diagnosis: LBP     Client Self Report:  See eval              Plan:  Discharge from therapy.    Discharge:    Reason for Discharge: pt wanting only 1 visit so she could undergo surgery    Equipment Issued: none    Discharge Plan: Patient to continue home program.      Izabela Moulton, PT, DTP, [facility]  Doctor of Physical Therapy #[id number]  [facility]  [phone]  [email or web address]

## 2022-08-23 ENCOUNTER — MYC REFILL (OUTPATIENT)
Dept: FAMILY MEDICINE | Facility: CLINIC | Age: 62
End: 2022-08-23

## 2022-08-23 DIAGNOSIS — F41.1 GENERALIZED ANXIETY DISORDER: ICD-10-CM

## 2022-08-23 RX ORDER — CLONAZEPAM 0.5 MG/1
TABLET ORAL
Qty: 45 TABLET | Refills: 0 | Status: CANCELLED | OUTPATIENT
Start: 2022-08-23

## 2022-10-26 DIAGNOSIS — F41.1 GENERALIZED ANXIETY DISORDER: ICD-10-CM

## 2022-10-26 DIAGNOSIS — J20.9 ACUTE BRONCHITIS WITH SYMPTOMS > 10 DAYS: ICD-10-CM

## 2022-10-27 RX ORDER — CLONAZEPAM 0.5 MG/1
TABLET ORAL
Qty: 45 TABLET | Refills: 0 | Status: SHIPPED | OUTPATIENT
Start: 2022-10-27 | End: 2022-12-05

## 2022-10-27 RX ORDER — ALBUTEROL SULFATE 90 UG/1
2 AEROSOL, METERED RESPIRATORY (INHALATION) EVERY 6 HOURS PRN
Qty: 8.5 G | Refills: 4 | Status: SHIPPED | OUTPATIENT
Start: 2022-10-27 | End: 2023-04-10

## 2022-11-04 ENCOUNTER — MYC MEDICAL ADVICE (OUTPATIENT)
Dept: FAMILY MEDICINE | Facility: CLINIC | Age: 62
End: 2022-11-04

## 2022-11-10 ENCOUNTER — MYC MEDICAL ADVICE (OUTPATIENT)
Dept: FAMILY MEDICINE | Facility: CLINIC | Age: 62
End: 2022-11-10

## 2022-11-11 NOTE — TELEPHONE ENCOUNTER
Dr. Glasgow,    I sent back a my chart message stating they need a virtual visit if wanting paxlovid.  Please see my chart message. Ana BIRMINGHAM RN

## 2022-11-11 NOTE — TELEPHONE ENCOUNTER
Form completed, signed, and placed at the  for patient to . Copy of form sent to scan and copy placed in cabinet with FMLA forms. My Chart note sent to patient informing of status of form.

## 2022-11-19 ENCOUNTER — HEALTH MAINTENANCE LETTER (OUTPATIENT)
Age: 62
End: 2022-11-19

## 2022-11-30 ENCOUNTER — TELEPHONE (OUTPATIENT)
Dept: FAMILY MEDICINE | Facility: CLINIC | Age: 62
End: 2022-11-30

## 2022-11-30 NOTE — LETTER
November 30, 2022      Cris Priest  [address] [place] [address]        Dear Cris,     Your team at [facility] cares about your health. We have reviewed your chart and based on our findings; we are making the following recommendations to better manage your health.     You are in particular need of attention regarding the following:     Schedule Annual MAMMOGRAPHY. [facility] scheduling number is [phone] or schedule in MyChart (self referral).  1 in 8 women will develop invasive breast cancer during her lifetime and it is the most common non-skin cancer in American Women. EARLY detection, new treatments, and a better understanding of the disease have increased survival rates- the 5 year survival rate in the 1960's was 63% and today it is close to 90%.  Call or MyChart message your clinic to schedule a colonoscopy, schedule/ a FIT Test, or order a Cologuard test. If you are unsure what type of test you need, please call your clinic and speak to clinic staff.   Colon cancer is now the second leading cause of cancer-related deaths in the [place] for both men and women and there are over 130,000 new cases and 50,000 deaths per year from colon cancer. Colonoscopies can prevent 90-95% of these deaths. Problem lesions can be removed before they ever become cancer. This test is not only looking for cancer, but also getting rid of precancerous lesions.   Please call [phone] to schedule a colonoscopy.    If you have already completed these items, please contact the clinic via phone or   [employer] so your care team can review and update your records. Thank you for   choosing [facility] for your healthcare needs. For any questions,   concerns, or to schedule an appointment please contact our clinic.    Healthy Regards,      Your [facility] Care Team

## 2022-11-30 NOTE — TELEPHONE ENCOUNTER
Patient Quality Outreach    Patient is due for the following:   Colon Cancer Screening  Breast Cancer Screening - Mammogram    Next Steps:   Patient needs to complete a mammogram and a colon cancer screening.    Type of outreach:    Sent letter.      Questions for provider review:    None     Beverley Horowitz

## 2022-12-02 DIAGNOSIS — F41.1 GENERALIZED ANXIETY DISORDER: ICD-10-CM

## 2022-12-02 DIAGNOSIS — F33.41 RECURRENT MAJOR DEPRESSIVE DISORDER, IN PARTIAL REMISSION (H): ICD-10-CM

## 2022-12-02 DIAGNOSIS — M32.9 SYSTEMIC LUPUS ERYTHEMATOSUS, UNSPECIFIED SLE TYPE, UNSPECIFIED ORGAN INVOLVEMENT STATUS (H): ICD-10-CM

## 2022-12-05 RX ORDER — SERTRALINE HYDROCHLORIDE 100 MG/1
TABLET, FILM COATED ORAL
Qty: 180 TABLET | Refills: 0 | Status: SHIPPED | OUTPATIENT
Start: 2022-12-05 | End: 2023-02-03

## 2022-12-05 RX ORDER — CLONAZEPAM 0.5 MG/1
TABLET ORAL
Qty: 45 TABLET | Refills: 0 | Status: SHIPPED | OUTPATIENT
Start: 2022-12-05 | End: 2023-01-11

## 2022-12-05 RX ORDER — HYDROXYCHLOROQUINE SULFATE 200 MG/1
TABLET, FILM COATED ORAL
Qty: 90 TABLET | Refills: 0 | Status: SHIPPED | OUTPATIENT
Start: 2022-12-05 | End: 2023-02-03

## 2022-12-05 RX ORDER — AZATHIOPRINE 50 MG/1
TABLET ORAL
Qty: 90 TABLET | Refills: 0 | Status: SHIPPED | OUTPATIENT
Start: 2022-12-05 | End: 2023-02-03

## 2022-12-06 ENCOUNTER — MYC REFILL (OUTPATIENT)
Dept: FAMILY MEDICINE | Facility: CLINIC | Age: 62
End: 2022-12-06

## 2022-12-06 DIAGNOSIS — F41.1 GENERALIZED ANXIETY DISORDER: ICD-10-CM

## 2022-12-06 RX ORDER — CLONAZEPAM 0.5 MG/1
TABLET ORAL
Qty: 45 TABLET | Refills: 0 | Status: CANCELLED | OUTPATIENT
Start: 2022-12-06

## 2023-01-11 ENCOUNTER — MYC REFILL (OUTPATIENT)
Dept: FAMILY MEDICINE | Facility: CLINIC | Age: 63
End: 2023-01-11

## 2023-01-11 DIAGNOSIS — F41.1 GENERALIZED ANXIETY DISORDER: ICD-10-CM

## 2023-01-12 RX ORDER — CLONAZEPAM 0.5 MG/1
TABLET ORAL
Qty: 45 TABLET | Refills: 0 | Status: SHIPPED | OUTPATIENT
Start: 2023-01-12 | End: 2023-02-03

## 2023-01-12 NOTE — TELEPHONE ENCOUNTER
Pending Prescriptions:                       Disp   Refills    clonazePAM (KLONOPIN) 0.5 MG tablet       45 tab*0            Sig: TAKE ONE-HALF TABLET BY MOUTH THREE TIMES A DAY           AS NEEDED FOR ANXIETY    Routing refill request to provider for review/approval because:  Drug not on the G refill protocol       Dylon De Leon RN

## 2023-02-01 ENCOUNTER — TRANSCRIBE ORDERS (OUTPATIENT)
Dept: OTHER | Age: 63
End: 2023-02-01

## 2023-02-01 DIAGNOSIS — M54.50 LOW BACK PAIN SYNDROME: Primary | ICD-10-CM

## 2023-02-01 DIAGNOSIS — M54.16 LUMBAR NEURITIS: ICD-10-CM

## 2023-02-01 ASSESSMENT — ANXIETY QUESTIONNAIRES
8. IF YOU CHECKED OFF ANY PROBLEMS, HOW DIFFICULT HAVE THESE MADE IT FOR YOU TO DO YOUR WORK, TAKE CARE OF THINGS AT HOME, OR GET ALONG WITH OTHER PEOPLE?: NOT DIFFICULT AT ALL
2. NOT BEING ABLE TO STOP OR CONTROL WORRYING: NOT AT ALL
GAD7 TOTAL SCORE: 1
IF YOU CHECKED OFF ANY PROBLEMS ON THIS QUESTIONNAIRE, HOW DIFFICULT HAVE THESE PROBLEMS MADE IT FOR YOU TO DO YOUR WORK, TAKE CARE OF THINGS AT HOME, OR GET ALONG WITH OTHER PEOPLE: NOT DIFFICULT AT ALL
6. BECOMING EASILY ANNOYED OR IRRITABLE: NOT AT ALL
GAD7 TOTAL SCORE: 1
5. BEING SO RESTLESS THAT IT IS HARD TO SIT STILL: NOT AT ALL
7. FEELING AFRAID AS IF SOMETHING AWFUL MIGHT HAPPEN: NOT AT ALL
3. WORRYING TOO MUCH ABOUT DIFFERENT THINGS: NOT AT ALL
4. TROUBLE RELAXING: SEVERAL DAYS
1. FEELING NERVOUS, ANXIOUS, OR ON EDGE: NOT AT ALL
7. FEELING AFRAID AS IF SOMETHING AWFUL MIGHT HAPPEN: NOT AT ALL

## 2023-02-03 ENCOUNTER — VIRTUAL VISIT (OUTPATIENT)
Dept: FAMILY MEDICINE | Facility: CLINIC | Age: 63
End: 2023-02-03
Payer: COMMERCIAL

## 2023-02-03 DIAGNOSIS — T50.905A ITCHING DUE TO DRUG: ICD-10-CM

## 2023-02-03 DIAGNOSIS — M81.0 AGE-RELATED OSTEOPOROSIS WITHOUT CURRENT PATHOLOGICAL FRACTURE: ICD-10-CM

## 2023-02-03 DIAGNOSIS — Z51.81 THERAPEUTIC DRUG MONITORING: ICD-10-CM

## 2023-02-03 DIAGNOSIS — E66.01 MORBID OBESITY DUE TO EXCESS CALORIES (H): ICD-10-CM

## 2023-02-03 DIAGNOSIS — Z12.31 VISIT FOR SCREENING MAMMOGRAM: ICD-10-CM

## 2023-02-03 DIAGNOSIS — F33.41 RECURRENT MAJOR DEPRESSIVE DISORDER, IN PARTIAL REMISSION (H): ICD-10-CM

## 2023-02-03 DIAGNOSIS — M62.838 MUSCLE SPASM: ICD-10-CM

## 2023-02-03 DIAGNOSIS — M32.9 SYSTEMIC LUPUS ERYTHEMATOSUS, UNSPECIFIED SLE TYPE, UNSPECIFIED ORGAN INVOLVEMENT STATUS (H): ICD-10-CM

## 2023-02-03 DIAGNOSIS — F41.1 GAD (GENERALIZED ANXIETY DISORDER): Primary | ICD-10-CM

## 2023-02-03 DIAGNOSIS — L29.89 ITCHING DUE TO DRUG: ICD-10-CM

## 2023-02-03 DIAGNOSIS — Z12.11 SCREEN FOR COLON CANCER: ICD-10-CM

## 2023-02-03 PROCEDURE — 99214 OFFICE O/P EST MOD 30 MIN: CPT | Mod: 95 | Performed by: FAMILY MEDICINE

## 2023-02-03 RX ORDER — PREDNISONE 1 MG/1
1 TABLET ORAL DAILY
Qty: 90 TABLET | Refills: 3 | Status: SHIPPED | OUTPATIENT
Start: 2023-02-03 | End: 2024-03-05

## 2023-02-03 RX ORDER — SERTRALINE HYDROCHLORIDE 100 MG/1
200 TABLET, FILM COATED ORAL DAILY
Qty: 180 TABLET | Refills: 3 | Status: SHIPPED | OUTPATIENT
Start: 2023-02-03 | End: 2024-03-05

## 2023-02-03 RX ORDER — AMITRIPTYLINE HYDROCHLORIDE 100 MG/1
100 TABLET ORAL AT BEDTIME
Qty: 90 TABLET | Refills: 3 | Status: SHIPPED | OUTPATIENT
Start: 2023-02-03 | End: 2024-03-05

## 2023-02-03 RX ORDER — HYDROXYZINE HYDROCHLORIDE 25 MG/1
25-50 TABLET, FILM COATED ORAL EVERY 6 HOURS PRN
Qty: 60 TABLET | Refills: 6 | Status: SHIPPED | OUTPATIENT
Start: 2023-02-03

## 2023-02-03 RX ORDER — CYCLOBENZAPRINE HCL 10 MG
10 TABLET ORAL 2 TIMES DAILY PRN
Qty: 60 TABLET | Refills: 3 | Status: SHIPPED | OUTPATIENT
Start: 2023-02-03

## 2023-02-03 RX ORDER — AZATHIOPRINE 50 MG/1
50 TABLET ORAL 3 TIMES DAILY
Qty: 90 TABLET | Refills: 3 | Status: SHIPPED | OUTPATIENT
Start: 2023-02-03 | End: 2024-03-08

## 2023-02-03 RX ORDER — HYDROXYCHLOROQUINE SULFATE 200 MG/1
600 TABLET, FILM COATED ORAL DAILY
Qty: 90 TABLET | Refills: 11 | Status: SHIPPED | OUTPATIENT
Start: 2023-02-03 | End: 2024-03-05

## 2023-02-03 RX ORDER — CLONAZEPAM 0.5 MG/1
TABLET ORAL
Qty: 45 TABLET | Refills: 5 | Status: SHIPPED | OUTPATIENT
Start: 2023-02-03 | End: 2023-08-23

## 2023-02-03 ASSESSMENT — PATIENT HEALTH QUESTIONNAIRE - PHQ9: SUM OF ALL RESPONSES TO PHQ QUESTIONS 1-9: 0

## 2023-02-03 ASSESSMENT — ANXIETY QUESTIONNAIRES: GAD7 TOTAL SCORE: 1

## 2023-02-03 NOTE — PROGRESS NOTES
Cris is a 62 year old who is being evaluated via a billable video visit.      How would you like to obtain your AVS? Mychart  If the video visit is dropped, the invitation should be resent by: Text to cell phone: [phone]  Will anyone else be joining your video visit? No      Assessment & Plan     EUGENIE (generalized anxiety disorder)  Stable, refill  - amitriptyline (ELAVIL) 100 MG tablet; Take 1 tablet (100 mg) by mouth At Bedtime  - Comprehensive metabolic panel (BMP + Alb, Alk Phos, ALT, AST, Total. Bili, TP); Future  - clonazePAM (KLONOPIN) 0.5 MG tablet; TAKE a HALF TABLET BY MOUTH THREE TIMES A DAY AS NEEDED FOR ANXIETY  - CBC with platelets; Future    Systemic lupus erythematosus, unspecified SLE type, unspecified organ involvement status (H)  Needing blood work, coming in for pre-op soon and will complete then  refill  - azaTHIOprine (IMURAN) 50 MG tablet; Take 1 tablet (50 mg) by mouth 3 times daily  - hydroxychloroquine (PLAQUENIL) 200 MG tablet; Take 3 tablets (600 mg) by mouth daily  - predniSONE (DELTASONE) 1 MG tablet; Take 1 tablet (1 mg) by mouth daily  - Comprehensive metabolic panel (BMP + Alb, Alk Phos, ALT, AST, Total. Bili, TP); Future  - CBC with platelets; Future      Muscle spasm  If needed  - cyclobenzaprine (FLEXERIL) 10 MG tablet; Take 1 tablet (10 mg) by mouth 2 times daily as needed for muscle spasms Start at night only, and increase as tolerated to daytime use, up to three times a day.  Take regularly at least 1-2 weeks, for any pain flair.    Itching due to medication  If needed for itching with opioid  - hydrOXYzine (ATARAX) 25 MG tablet; Take 1-2 tablets (25-50 mg) by mouth every 6 hours as needed for itching or anxiety    Recurrent major depressive disorder, in partial remission (H)  Stable, refill  - sertraline (ZOLOFT) 100 MG tablet; Take 2 tablets (200 mg) by mouth daily    Screen for colon cancer  FIT next visit    Age-related osteoporosis without current pathological  fracture  - DEXA HIP/PELVIS/SPINE - Future; Future    Visit for screening mammogram  - MA SCREENING DIGITAL BILAT - Future  (s+30); Future    Morbid obesity due to excess calories (H)  Weight unchanged.  - Hemoglobin A1c; Future    Therapeutic drug monitoring         See Patient Instructions    Return in about 4 weeks (around 3/3/2023) for labs.    Luca Glasgow MD  [facility] is a 62 year [facility], presenting for the following health issues:  Recheck Medication  Eye Exam Sarita Dr. Carlos Dec 2022, no change      History of Present Illness       Reason for visit:  Med chk checking in updated Surgery    She eats 4 or more servings of fruits and vegetables daily.She consumes 1 sweetened beverage(s) daily.She exercises with enough effort to increase her heart rate 9 or less minutes per day.  She exercises with enough effort to increase her heart rate 3 or less days per week.   She is taking medications regularly.  Today's EUGENIE-7 Score: 1       Depression and Anxiety Follow-Up    How are you doing with your depression since your last visit? Stable    How are you doing with your anxiety since your last visit?  Stable    Are you having other symptoms that might be associated with depression or anxiety? No    Have you had a significant life event? No     Do you have any concerns with your use of alcohol or other drugs? No    Social History     Tobacco Use     Smoking status: Light Smoker     Packs/day: 0.50     Years: 10.00     Pack years: 5.00     Types: Cigarettes, Other     Last attempt to quit: 2015     Years since quittin.2     Smokeless tobacco: Current     Tobacco comments:     Use Electronic Cigarette   Vaping Use     Vaping Use: Some days     Substances: Nicotine   Substance Use Topics     Alcohol use: No     Alcohol/week: 0.0 standard drinks     Drug use: No     PHQ 2021   PHQ-9 Total Score 0 1 2   Q9: Thoughts of better off  dead/self-harm past 2 weeks Not at all Not at all Not at all     EUGENIE-7 SCORE 6/11/2021 12/21/2021 2/1/2023   Total Score - - -   Total Score - - 1 (minimal anxiety)   Total Score 1 1 1     Last PHQ-9 2/1/2023   1.  Little interest or pleasure in doing things 0   2.  Feeling down, depressed, or hopeless 0   3.  Trouble falling or staying asleep, or sleeping too much 0   4.  Feeling tired or having little energy 0   5.  Poor appetite or overeating 0   6.  Feeling bad about yourself 0   7.  Trouble concentrating 0   8.  Moving slowly or restless 0   Q9: Thoughts of better off dead/self-harm past 2 weeks 0   PHQ-9 Total Score 0   Difficulty at work, home, or with people Not difficult at all     EUGENIE-7  2/1/2023   1. Feeling nervous, anxious, or on edge 0   2. Not being able to stop or control worrying 0   3. Worrying too much about different things 0   4. Trouble relaxing 1   5. Being so restless that it is hard to sit still 0   6. Becoming easily annoyed or irritable 0   7. Feeling afraid, as if something awful might happen 0   EUGENIE-7 Total Score 1   If you checked any problems, how difficult have they made it for you to do your work, take care of things at home, or get along with other people? Not difficult at all       Suicide Assessment Five-step Evaluation and Treatment (SAFE-T)    Pain History:  When did you first notice your pain? - Chronic Pain. Patient states she will having another surgery probably March 2023. Having SI joint stabilization.  Dr. Mattson from [facility].  Have you seen this provider for your pain in the past?   Yes   Where in your body do you have pain? Lupus flares and low back pain  Are you seeing anyone else for your pain? Yes - [facility]       EUGENIE-7 SCORE 6/11/2021 12/21/2021 2/1/2023   Total Score - - -   Total Score - - 1 (minimal anxiety)   Total Score 1 1 1       EUGENIE-7 SCORE 6/11/2021 12/21/2021 2/1/2023   Total Score - - -   Total Score - - 1 (minimal anxiety)   Total Score 1 1  1     PEG Score 2/1/2023   PEG Total Score 5.33       PDMP Review       Value Time User    State PDMP site checked  Yes 1/12/2023  1:56 PM Luca Glasgow MD        Last CSA Agreement:   CSA -- Patient Level:    CSA: None found at the patient level.       Last UDS: 3/1/2014      Review of Systems         Objective           Vitals:  No vitals were obtained today due to virtual visit.    Physical Exam   GENERAL: Healthy, alert and no distress  EYES: Eyes grossly normal to inspection.  No discharge or erythema, or obvious scleral/conjunctival abnormalities.  RESP: No audible wheeze, cough, or visible cyanosis.  No visible retractions or increased work of breathing.    SKIN: Visible skin clear. No significant rash, abnormal pigmentation or lesions.  NEURO: Cranial nerves grossly intact.  Mentation and speech appropriate for age.  PSYCH: Mentation appears normal, affect normal/bright, judgement and insight intact, normal speech and appearance well-groomed.          Video-Visit Details    Type of service:  Video Visit     Originating Location (pt. Location): Home   Distant Location (provider location):  On-site  Platform used for Video Visit: Miller

## 2023-02-03 NOTE — PATIENT INSTRUCTIONS
To schedule your imaging, please call [phone] for [place] locations for the Bone Density test and mammogram    Copy of Eye exam

## 2023-02-19 ENCOUNTER — MYC MEDICAL ADVICE (OUTPATIENT)
Dept: FAMILY MEDICINE | Facility: CLINIC | Age: 63
End: 2023-02-19
Payer: COMMERCIAL

## 2023-02-22 ENCOUNTER — TELEPHONE (OUTPATIENT)
Dept: FAMILY MEDICINE | Facility: CLINIC | Age: 63
End: 2023-02-22
Payer: COMMERCIAL

## 2023-02-22 NOTE — TELEPHONE ENCOUNTER
Patient Quality Outreach    Patient is due for the following:   Colon Cancer Screening  Breast Cancer Screening - Mammogram  Physical Preventive Adult Physical    Next Steps:   Patient has upcoming appointment, these items will be addressed at that time.    Type of outreach:    Chart review performed, no outreach needed.      Questions for provider review:    None     Beverley Horowitz

## 2023-02-27 NOTE — TELEPHONE ENCOUNTER
Please notify Dr. Forte's care team that I filled the MS contin 15mg for 6/30/22 as that was when she was due per [place]  as Dr. Forte was out of clinic until 7/7    Thank you,  Luca Glasgow MD     Xolair Pregnancy And Lactation Text: This medication is Pregnancy Category B and is considered safe during pregnancy. This medication is excreted in breast milk.

## 2023-02-28 ENCOUNTER — HOSPITAL ENCOUNTER (OUTPATIENT)
Dept: PHYSICAL THERAPY | Facility: CLINIC | Age: 63
Setting detail: THERAPIES SERIES
Discharge: HOME | End: 2023-02-28
Attending: NEUROLOGICAL SURGERY
Payer: COMMERCIAL

## 2023-02-28 ENCOUNTER — HOSPITAL ENCOUNTER (OUTPATIENT)
Dept: GENERAL RADIOLOGY | Facility: CLINIC | Age: 63
Discharge: HOME | End: 2023-02-28
Attending: PHYSICIAN ASSISTANT | Admitting: PHYSICIAN ASSISTANT
Payer: COMMERCIAL

## 2023-02-28 DIAGNOSIS — M54.16 LUMBAR NEURITIS: ICD-10-CM

## 2023-02-28 DIAGNOSIS — M54.50 LOW BACK PAIN SYNDROME: ICD-10-CM

## 2023-02-28 DIAGNOSIS — M46.1 SACROILIITIS (H): ICD-10-CM

## 2023-02-28 PROCEDURE — 97161 PT EVAL LOW COMPLEX 20 MIN: CPT | Mod: GP | Performed by: PHYSICAL THERAPIST

## 2023-02-28 PROCEDURE — 72170 X-RAY EXAM OF PELVIS: CPT

## 2023-02-28 NOTE — PROGRESS NOTES
02/28/23 1500   General Information   Type of Visit Initial OP Ortho PT Evaluation   Start of Care Date 02/28/23   Referring Physician Joss Mattson MD   Patient/Family Goals Statement Determine ability to improve   Orders Evaluate and Treat   Date of Order 01/31/23   Medical Diagnosis back pain syndrome, neuritis   Body Part(s)   Body Part(s) Lumbar Spine/SI   Presentation and Etiology   Pertinent history of current problem (include personal factors and/or comorbidities that impact the POC) Pt states on 4/7/18 she fell off a 12 foot ladder onto a chair.   Since that fall has had 23 procedure including L4 -S1 fusions,  at least 12 GOOY/ SI injections.   Pt has had chiropractic treatments;  RFI x 2 and kyphoplasty T12-L2.  Pt is going to have SI joint stabilization surgery--not scheduled yet.  Pain @ best 8/10, @ worst 10/10.  Pain goes down R LE to lower leg and has numbness bottom of both feet.   PMHX:  raynauds,  Lupus.  fibromyalgia, HBP, depression,  RA, osteoporosis,  B TKA   Impairments A. Pain;B. Decreased WB tolerance;F. Decreased strength and endurance;G. Impaired balance;H. Impaired gait;K. Numbness;L. Tingling  (breathing)   Pain quality A. Sharp;C. Aching;E. Shooting;F. Stabbing   Pain exacerbation comment Walking 10 feet at most in the house.  Stairs marked time 1X/day (notes she has fallen down the steps).No lifting/ carrying.  will not  anything from the floor --now has a grabber.   Pain/symptoms eased by A. Sitting;B. Walking;D. Nothing  (pain meds)   Progression of symptoms since onset: Worsened  (pain increasing.)   Prior Level of Function   Functional Level Prior Comment Pt has a walker available.  Overall has been inactive since the injury.   Pt does sit to stand w/ assist and attempts bridges.   Current Level of Function   Patient role/employment history F. Retired   Fall Risk Screen   Fall screen completed by PT   Have you fallen 2 or more times in the past year? Yes   Have you fallen  "and had an injury in the past year? Yes   Timed Up and Go score (seconds) 35.6   Is patient a fall risk? Yes;Department fall risk interventions implemented   Abuse Screen (yes response referral indicated)   Feels Unsafe at Home or Work/School no   Feels Threatened by Someone no   Does Anyone Try to Keep You From Having Contact with Others or Doing Things Outside Your Home? no   Physical Signs of Abuse Present no   Lumbar Spine/SI Objective Findings   Gait/Locomotion slow pace, short stride   Right Side Bending ROM 25% w/ increased pain   Left Side Bending ROM 40%   Hip Flexion (L2) Strength R 4-/5,  L 5/5   Knee Flexion Strength R 3/5, L 5/5   Knee Extension (L3) Strength R 3/5, L 5/5   Ankle Dorsiflexion (L4) Strength R 4+/5,  L 5/5   Lumbar/SI Special Tests Comments sit to stand 3 reps in 35\" w/ UE assist.   Beatrice score 7 (high) and BE 80%   Observation VMO atrophy R   Posture FB posture,  pt will hold onto R side as she gets sore   Clinical Impression   Criteria for Skilled Therapeutic Interventions Met evaluation only   PT Diagnosis chronic back pain   Influenced by the following impairments pain, decreased strength, decreased mobility   Functional limitations due to impairments all daily activities are limited including sitting, walking, sit to stand, household tasks, stairs   Clinical Presentation Evolving/Changing   Clinical Presentation Rationale Pt notes symptoms are worsening   Clinical Decision Making (Complexity) Low complexity   Clinical Impression Comments Cris presents with chronic low back pain limiting ADL's and overall active participation in PT.  At this time she is not a candidate for physical therapy due to severe limitations in mobility due to pain.  Cris may be a candidate for physical therapy following SI surgery   Total Evaluation Time   PT Eval, Low Complexity Minutes (33023) 30     Thank you for this referral,    Myriam Kuhn, PT,   #[id number]  [facility]  [phone]   "

## 2023-04-03 ENCOUNTER — MYC MEDICAL ADVICE (OUTPATIENT)
Dept: FAMILY MEDICINE | Facility: CLINIC | Age: 63
End: 2023-04-03
Payer: COMMERCIAL

## 2023-04-06 ENCOUNTER — OFFICE VISIT (OUTPATIENT)
Dept: FAMILY MEDICINE | Facility: CLINIC | Age: 63
End: 2023-04-06
Payer: COMMERCIAL

## 2023-04-06 VITALS
HEIGHT: 65 IN | OXYGEN SATURATION: 95 % | DIASTOLIC BLOOD PRESSURE: 80 MMHG | SYSTOLIC BLOOD PRESSURE: 136 MMHG | RESPIRATION RATE: 20 BRPM | TEMPERATURE: 99 F | BODY MASS INDEX: 38 KG/M2 | WEIGHT: 228.1 LBS | HEART RATE: 76 BPM

## 2023-04-06 DIAGNOSIS — M53.3 SACROILIAC JOINT PAIN: ICD-10-CM

## 2023-04-06 DIAGNOSIS — Z01.818 PREOP GENERAL PHYSICAL EXAM: Primary | ICD-10-CM

## 2023-04-06 DIAGNOSIS — M45.9 ANKYLOSING SPONDYLITIS, UNSPECIFIED SITE OF SPINE (H): ICD-10-CM

## 2023-04-06 DIAGNOSIS — M32.8 OTHER FORMS OF SYSTEMIC LUPUS ERYTHEMATOSUS, UNSPECIFIED ORGAN INVOLVEMENT STATUS (H): ICD-10-CM

## 2023-04-06 LAB
ANION GAP SERPL CALCULATED.3IONS-SCNC: 13 MMOL/L (ref 7–15)
BUN SERPL-MCNC: 11.7 MG/DL (ref 8–23)
CALCIUM SERPL-MCNC: 9.7 MG/DL (ref 8.8–10.2)
CHLORIDE SERPL-SCNC: 97 MMOL/L (ref 98–107)
CREAT SERPL-MCNC: 0.83 MG/DL (ref 0.51–0.95)
DEPRECATED HCO3 PLAS-SCNC: 27 MMOL/L (ref 22–29)
ERYTHROCYTE [DISTWIDTH] IN BLOOD BY AUTOMATED COUNT: 15.6 % (ref 10–15)
GFR SERPL CREATININE-BSD FRML MDRD: 78 ML/MIN/1.73M2
GLUCOSE SERPL-MCNC: 96 MG/DL (ref 70–99)
HCT VFR BLD AUTO: 50.3 % (ref 35–47)
HGB BLD-MCNC: 14.6 G/DL (ref 11.7–15.7)
INR PPP: 1.19 (ref 0.85–1.15)
MCH RBC QN AUTO: 27 PG (ref 26.5–33)
MCHC RBC AUTO-ENTMCNC: 29 G/DL (ref 31.5–36.5)
MCV RBC AUTO: 93 FL (ref 78–100)
PLATELET # BLD AUTO: 238 10E3/UL (ref 150–450)
POTASSIUM SERPL-SCNC: 4.1 MMOL/L (ref 3.4–5.3)
RBC # BLD AUTO: 5.4 10E6/UL (ref 3.8–5.2)
SODIUM SERPL-SCNC: 137 MMOL/L (ref 136–145)
WBC # BLD AUTO: 10.5 10E3/UL (ref 4–11)

## 2023-04-06 PROCEDURE — 36415 COLL VENOUS BLD VENIPUNCTURE: CPT | Performed by: NURSE PRACTITIONER

## 2023-04-06 PROCEDURE — 93000 ELECTROCARDIOGRAM COMPLETE: CPT | Performed by: NURSE PRACTITIONER

## 2023-04-06 PROCEDURE — 80048 BASIC METABOLIC PNL TOTAL CA: CPT | Performed by: NURSE PRACTITIONER

## 2023-04-06 PROCEDURE — 99214 OFFICE O/P EST MOD 30 MIN: CPT | Performed by: NURSE PRACTITIONER

## 2023-04-06 PROCEDURE — 85027 COMPLETE CBC AUTOMATED: CPT | Performed by: NURSE PRACTITIONER

## 2023-04-06 PROCEDURE — 85610 PROTHROMBIN TIME: CPT | Performed by: NURSE PRACTITIONER

## 2023-04-06 ASSESSMENT — PAIN SCALES - GENERAL: PAINLEVEL: EXTREME PAIN (8)

## 2023-04-06 NOTE — TELEPHONE ENCOUNTER
Form completed, signed, and faxed to [phone]. Copy of form sent to scan and copy placed in front of cabinet. Patient notified of status via My Chart.

## 2023-04-06 NOTE — PATIENT INSTRUCTIONS
Continue holding Clonazepam until after surgery then follow up with PCP for resuming.  Imuran hold 2 weeks prior to surgery  Prednisone hold 2 weeks prior to surgery.    Ok to continue with Plaquenil per surgeon.    Labs and EKG today.    JUDITH Anaya    For informational purposes only. Not to replace the advice of your health care provider. Copyright   ,  [facility]. All rights reserved. Clinically reviewed by Lola Hobbs MD. [employer] [id number] - REV .  Preparing for Your Surgery  Getting started  A nurse will call you to review your health history and instructions. They will give you an arrival time based on your scheduled surgery time. Please be ready to share:  Your doctor's clinic name and phone number  Your medical, surgical, and anesthesia history  A list of allergies and sensitivities  A list of medicines, including herbal treatments and over-the-counter drugs  Whether the patient has a legal guardian (ask how to send us the papers in advance)  Please tell us if you're pregnant--or if there's any chance you might be pregnant. Some surgeries may injure a fetus (unborn baby), so they require a pregnancy test. Surgeries that are safe for a fetus don't always need a test, and you can choose whether to have one.   If you have a child who's having surgery, please ask for a copy of Preparing for Your Child's Surgery.    Preparing for surgery  Within 10 to 30 days of surgery: Have a pre-op exam (sometimes called an H&P, or History and Physical). This can be done at a clinic or pre-operative center.  If you're having a , you may not need this exam. Talk to your care team.  At your pre-op exam, talk to your care team about all medicines you take. If you need to stop any medicines before surgery, ask when to start taking them again.  We do this for your safety. Many medicines can make you bleed too much during surgery. Some change how well surgery (anesthesia) drugs  work.  Call your insurance company to let them know you're having surgery. (If you don't have insurance, call [phone].)  Call your clinic if there's any change in your health. This includes signs of a cold or flu (sore throat, runny nose, cough, rash, fever). It also includes a scrape or scratch near the surgery site.  If you have questions on the day of surgery, call your hospital or surgery center.  Eating and drinking guidelines  For your safety: Unless your surgeon tells you otherwise, follow the guidelines below.  Eat and drink as usual until 8 hours before you arrive for surgery. After that, no food or milk.  Drink clear liquids until 2 hours before you arrive. These are liquids you can see through, like water, Gatorade, and Propel Water. They also include plain black coffee and tea (no cream or milk), candy, and breath mints. You can spit out gum when you arrive.  If you drink alcohol: Stop drinking it the night before surgery.  If your care team tells you to take medicine on the morning of surgery, it's okay to take it with a sip of water.  Preventing infection  Shower or bathe the night before and morning of your surgery. Follow the instructions your clinic gave you. (If no instructions, use regular soap.)  Don't shave or clip hair near your surgery site. We'll remove the hair if needed.  Don't smoke or vape the morning of surgery. You may chew nicotine gum up to 2 hours before surgery. A nicotine patch is okay.  Note: Some surgeries require you to completely quit smoking and nicotine. Check with your surgeon.  Your care team will make every effort to keep you safe from infection. We will:  Clean our hands often with soap and water (or an alcohol-based hand rub).  Clean the skin at your surgery site with a special soap that kills germs.  Give you a special gown to keep you warm. (Cold raises the risk of infection.)  Wear special hair covers, masks, gowns and gloves during surgery.  Give antibiotic  medicine, if prescribed. Not all surgeries need antibiotics.  What to bring on the day of surgery  Photo ID and insurance card  Copy of your health care directive, if you have one  Glasses and hearing aids (bring cases)  You can't wear contacts during surgery  Inhaler and eye drops, if you use them (tell us about these when you arrive)  CPAP machine or breathing device, if you use them  A few personal items, if spending the night  If you have . . .  A pacemaker, ICD (cardiac defibrillator) or other implant: Bring the ID card.  An implanted stimulator: Bring the remote control.  A legal guardian: Bring a copy of the certified (court-stamped) guardianship papers.  Please remove any jewelry, including body piercings. Leave jewelry and other valuables at home.  If you're going home the day of surgery  You must have a responsible adult drive you home. They should stay with you overnight as well.  If you don't have someone to stay with you, and you aren't safe to go home alone, we may keep you overnight. Insurance often won't pay for this.  After surgery  If it's hard to control your pain or you need more pain medicine, please call your surgeon's office.  Questions?   If you have any questions for your care team, list them here: _________________________________________________________________________________________________________________________________________________________________________ ____________________________________ ____________________________________ ____________________________________

## 2023-04-06 NOTE — PROGRESS NOTES
[facility]  [address] [address]  Phone: [phone]  Primary Provider: Luca Glasgow  Pre-op Performing Provider: JUAREZ PADGETT      PREOPERATIVE EVALUATION:  Today's date: 4/6/2023    Cris Priest is a 62 year old female who presents for a preoperative evaluation.      4/6/2023     4:17 PM   Additional Questions   Roomed by Marquita RENEE   Accompanied by self     Surgical Information:  Surgery/Procedure: back surgery, SI joint fusion, right  Surgery Location: [facility]  Surgeon: Dr. Mattson  Surgery Date: 4/17/23  Time of Surgery: 5:30 am  Where patient plans to recover: At home with family  Fax number for surgical facility: [facility] [phone], [facility] [phone]    Assessment & Plan     The proposed surgical procedure is considered INTERMEDIATE risk.    Preop general physical exam     - CBC with platelets  - INR  - Basic metabolic panel  (Ca, Cl, CO2, Creat, Gluc, K, Na, BUN)  - EKG 12-lead complete w/read - Clinics    Sacroiliac joint pain       Ankylosing spondylitis, unspecified site of spine (H)       Other forms of systemic lupus erythematosus, unspecified organ involvement status (H)  Hold medications as listed in AVS. Follow-up with Rheumatology as needed             Risks and Recommendations:  The patient has the following additional risks and recommendations for perioperative complications:   - No identified additional risk factors other than previously addressed    Medication Instructions:  Patient is to take all scheduled medications on the day of surgery EXCEPT for modifications listed below:   -See AVS per surgeon and Rheum recommendations.    RECOMMENDATION:  APPROVAL GIVEN to proceed with proposed procedure, without further diagnostic evaluation.         Subjective     HPI related to upcoming procedure: Fell in April 5 years ago and has been having ongoing low back pain and weakness in lower legs bilateral.  Has tried multiple therapies, nerve blocks, injections, PT without improvement.  Follows with Pain MD at [facility].  Following with Spinal specialist at [facility].        3/31/2023    11:48 AM   Preop Questions   1. Have you ever had a heart attack or stroke? No   2. Have you ever had surgery on your heart or blood vessels, such as a stent placement, a coronary artery bypass, or surgery on an artery in your head, neck, heart, or legs? No   3. Do you have chest pain with activity? No   4. Do you have a history of  heart failure? No   5. Do you currently have a cold, bronchitis or symptoms of other infection? No   6. Do you have a cough, shortness of breath, or wheezing? No   7. Do you or anyone in your family have previous history of blood clots? No   8. Do you or does anyone in your family have a serious bleeding problem such as prolonged bleeding following surgeries or cuts? No   9. Have you ever had problems with anemia or been told to take iron pills? No   10. Have you had any abnormal blood loss such as black, tarry or bloody stools, or abnormal vaginal bleeding? No   11. Have you ever had a blood transfusion? No   12. Are you willing to have a blood transfusion if it is medically needed before, during, or after your surgery? Yes   13. Have you or any of your relatives ever had problems with anesthesia? No   14. Do you have sleep apnea, excessive snoring or daytime drowsiness? No   15. Do you have any artifical heart valves or other implanted medical devices like a pacemaker, defibrillator, or continuous glucose monitor? No   16. Do you have artificial joints? YES - bilateral knee replacements.   17. Are you allergic to latex? No       Health Care Directive:  Patient does not have a Health Care Directive or Living Will: Advance Directive received and scanned. Click on Code in the patient header to view.    Preoperative Review of :   reviewed - controlled substances prescribed by other outside  provider(s).       Status of Chronic Conditions:  See problem list for active medical problems.  Problems all longstanding and stable, except as noted/documented.  See ROS for pertinent symptoms related to these conditions.      Review of Systems  Constitutional, neuro, ENT, endocrine, pulmonary, cardiac, gastrointestinal, genitourinary, musculoskeletal, integument and psychiatric systems are negative, except as otherwise noted.    Patient Active Problem List    Diagnosis Date Noted     Sacroiliac pain 11/13/2020     Priority: Medium     Spinal stenosis of lumbar region with neurogenic claudication 04/16/2018     Priority: Medium     AS (ankylosing spondylitis) (H) 04/07/2018     Priority: Medium     S/P total knee arthroplasty 01/07/2016     Priority: Medium     Left knee DJD 01/04/2016     Priority: Medium     On prednisone therapy 08/20/2015     Priority: Medium     Advanced directives, counseling/discussion 08/06/2014     Priority: Medium     Paperwork given to patient to fill out and return to [facility]       Essential hypertension 12/31/2013     Priority: Medium     Morbid obesity due to excess calories (H) 10/26/2012     Priority: Medium     Hyperlipidemia LDL goal <130 10/26/2012     Priority: Medium     Pain medication agreement 06/16/2011     Priority: Medium     On for chronic pain and fibromyalgia    Dr. Forte following  PRN tylenol #3           Osteoporosis 05/08/2008     Priority: Medium     Problem list name updated by automated process. Provider to review       GENERALIZED ANXIETY DIS 01/17/2008     Priority: Medium     Patient is followed by Mara Littlejohn DNP, RN, APRN and Dr. Moore  Med: Klonipin 0.5mg   diagnosis: EUGENIE  Maximum use per month: #45  Expected duration: lifetime  Clinic visit recommended: Q 3 month         Recurrent major depressive disorder, in partial remission (H) 06/21/2006     Priority: Medium     Systemic lupus erythematosus (H) 03/22/2005     Priority: Medium     alopecia,  "arthralgias, fever, no kidney dz       Fibromyalgia 03/22/2005     Priority: Medium     Problem list name updated by automated process. Provider to review        Past Medical History:   Diagnosis Date     Arthritis 1990     Depressive disorder 1963     FIBROMYALGIA      Hypertension 2011     Pneumonia 10/12/2008     Systemic lupus erythematosus (H)     alopecia, arthralgias, fever, no kidney dz     Systemic lupus erythematosus (H) 3/22/2005     Past Surgical History:   Procedure Laterality Date     ABDOMEN SURGERY       ARTHROPLASTY KNEE Left 1/4/2016    Procedure: ARTHROPLASTY KNEE;  Surgeon: Malcolm Ramsay MD;  Location: [facility] OR     BACK SURGERY       BIOPSY  [id number]    Chest area \"lupus\" & 2 spots on breast & lung     COLONOSCOPY  2013     HC REMOVAL OF OVARY/TUBE(S)      Salpingo-Oophorectomy, Unilateral LEFT     HC REMOVAL OF OVARY/TUBE(S)  10/02    Salpingo-Oophorectomy, Unilateral RIGHT     HC REMOVE TONSILS/ADENOIDS,<11 Y/O      T & A <10y.o.     HEAD & NECK SURGERY  2007    2 nodes taken for test Lymphoma     ORTHOPEDIC SURGERY      TKA  right     REPAIR HAMMER TOE  11/9/2011    Procedure:REPAIR HAMMER TOE; Right excision 5th metatarsal head ; Surgeon:TEENA REHMAN; Location:[facility] OR     SURGICAL HISTORY OF -   6/04    LEFT KNEE MEDIAL MENISCECTOMY     ZZC APPENDECTOMY       ZZC LAPAROSCOPY, SURGICAL; W/ VAGINAL HYSTERECTOMY W/WO REMOVAL OVARY(S)/TUBES  10/02    Laparoscopy, Vag Hysterectomy     Current Outpatient Medications   Medication Sig Dispense Refill     albuterol (PROAIR HFA/PROVENTIL HFA/VENTOLIN HFA) 108 (90 Base) MCG/ACT inhaler INHALE 2 PUFFS INTO THE LUNGS EVERY 6 HOURS AS NEEDED FOR SHORTNESS OF BREATH / DYSPNEA OR WHEEZING 8.5 g 4     amitriptyline (ELAVIL) 100 MG tablet Take 1 tablet (100 mg) by mouth At Bedtime 90 tablet 3     azaTHIOprine (IMURAN) 50 MG tablet Take 1 tablet (50 mg) by mouth 3 times daily 90 tablet 3     CALCIUM 500 MG OR TABS 1 TABLET ORALLY 3 TIMES DAILY  " 0     clonazePAM (KLONOPIN) 0.5 MG tablet TAKE a HALF TABLET BY MOUTH THREE TIMES A DAY AS NEEDED FOR ANXIETY 45 tablet 5     conjugated estrogens (PREMARIN) vaginal cream Place 0.5 g vaginally twice a week 12 g 3     cyclobenzaprine (FLEXERIL) 10 MG tablet Take 1 tablet (10 mg) by mouth 2 times daily as needed for muscle spasms Start at night only, and increase as tolerated to daytime use, up to three times a day.  Take regularly at least 1-2 weeks, for any pain flair. 60 tablet 3     hydroxychloroquine (PLAQUENIL) 200 MG tablet Take 3 tablets (600 mg) by mouth daily 90 tablet 11     hydrOXYzine (ATARAX) 25 MG tablet Take 1-2 tablets (25-50 mg) by mouth every 6 hours as needed for itching or anxiety 60 tablet 6     morphine (MS CONTIN) 15 MG CR tablet   0     predniSONE (DELTASONE) 1 MG tablet Take 1 tablet (1 mg) by mouth daily 90 tablet 3     sertraline (ZOLOFT) 100 MG tablet Take 2 tablets (200 mg) by mouth daily 180 tablet 3     VITAMIN D PO Take 5,000 Units by mouth every evening          Allergies   Allergen Reactions     Buspar [Buspirone] Hives     Cymbalta Hives     Gabapentin Hives and Itching     Hives and throat itching     Ivp Dye [Contrast Dye] Anaphylaxis     Seafood Anaphylaxis     Shellfish Allergy Swelling     Throat close up and swelling.        Social History     Tobacco Use     Smoking status: Former     Packs/day: 0.50     Years: 10.00     Pack years: 5.00     Types: Cigarettes, Other     Quit date: 10/19/2022     Years since quittin.4     Smokeless tobacco: Current     Tobacco comments:     Use Electronic Cigarette   Vaping Use     Vaping status: Some Days     Substances: Nicotine   Substance Use Topics     Alcohol use: No     Alcohol/week: 0.0 standard drinks of alcohol     Family History   Problem Relation Age of Onset     Arthritis Mother      Depression Mother      Unknown/Adopted Mother      Anxiety Disorder Mother      Depression Brother      Depression Sister      Depression  "Brother      Depression Brother      Anxiety Disorder Maternal Grandfather         Bad Anxiety was afraid to go anywhere. Cars,     History   Drug Use No         Objective     BP (!) 148/90 (BP Location: Left arm, Patient Position: Sitting, Cuff Size: Adult Large)   Pulse 76   Temp 99  F (37.2  C) (Tympanic)   Resp 20   Ht 1.638 m (5' 4.5\")   Wt 103.5 kg (228 lb 1.6 oz)   BMI 38.55 kg/m      Physical Exam    GENERAL APPEARANCE: alert, active, no distress and over weight     EYES: EOMI, PERRL     HENT: ear canals and TM's normal and nose and mouth without ulcers or lesions     NECK: no adenopathy, no asymmetry, masses, or scars and thyroid normal to palpation     RESP: lungs clear to auscultation - no rales, rhonchi or wheezes     CV: regular rates and rhythm, normal S1 S2, no S3 or S4 and no murmur, click or rub     ABDOMEN:  soft, nontender, no HSM or masses and bowel sounds normal     MS: extremities normal- no gross deformities noted, no evidence of inflammation in joints, FROM in all extremities.     SKIN: no suspicious lesions or rashes     NEURO: Normal strength and tone, sensory exam grossly normal, mentation intact and speech normal     PSYCH: mentation appears normal. and affect normal/bright     LYMPHATICS: No cervical adenopathy    No results for input(s): HGB, PLT, INR, NA, POTASSIUM, CR, A1C in the last 53593 hours.     Diagnostics:  Labs pending at this time.  Results will be reviewed when available.   EKG required for per surgeon requirements and not completed in the last 90 days.     Revised Cardiac Risk Index (RCRI):  The patient has the following serious cardiovascular risks for perioperative complications:   - No serious cardiac risks = 0 points     RCRI Interpretation: 0 points: Class I (very low risk - 0.4% complication rate)           Signed Electronically by: Tawana Coello NP  Copy of this evaluation report is provided to requesting physician.       "

## 2023-04-07 NOTE — RESULT ENCOUNTER NOTE
Cris,    Your labs are stable/unchanged.  Fax pre op and labs to surgery center.    Please notify patient of results.  JUDITH Anaya

## 2023-04-10 DIAGNOSIS — J20.9 ACUTE BRONCHITIS WITH SYMPTOMS > 10 DAYS: ICD-10-CM

## 2023-04-10 RX ORDER — ALBUTEROL SULFATE 90 UG/1
2 AEROSOL, METERED RESPIRATORY (INHALATION) EVERY 6 HOURS PRN
Qty: 8.5 G | Refills: 4 | Status: SHIPPED | OUTPATIENT
Start: 2023-04-10 | End: 2023-09-20

## 2023-04-10 NOTE — TELEPHONE ENCOUNTER
"Prescription approved per [facility] Refill Protocol.    Pending Prescriptions:                       Disp   Refills    albuterol (PROAIR HFA/PROVENTIL HFA/ELIS*8.5 g  4            Sig: INHALE 2 PUFFS INTO THE LUNGS EVERY 6 HOURS AS           NEEDED FOR SHORTNESS OF BREATH / DYSPNEA OR           WHEEZING      Requested Prescriptions   Pending Prescriptions Disp Refills     albuterol (PROAIR HFA/PROVENTIL HFA/VENTOLIN HFA) 108 (90 Base) MCG/ACT inhaler [Pharmacy Med Name: ALBUTEROL SULFATE  AERS] 8.5 g 4     Sig: INHALE 2 PUFFS INTO THE LUNGS EVERY 6 HOURS AS NEEDED FOR SHORTNESS OF BREATH / DYSPNEA OR WHEEZING       Asthma Maintenance Inhalers - Anticholinergics Passed - 4/10/2023  9:51 AM        Passed - Patient is age 12 years or older        Passed - Recent (12 mo) or future (30 days) visit within the authorizing provider's specialty     Patient has had an office visit with the authorizing provider or a provider within the authorizing providers department within the previous 12 mos or has a future within next 30 days. See \"Patient Info\" tab in inbasket, or \"Choose Columns\" in Meds & Orders section of the refill encounter.              Passed - Medication is active on med list       Short-Acting Beta Agonist Inhalers Protocol  Passed - 4/10/2023  9:51 AM        Passed - Patient is age 12 or older        Passed - Recent (12 mo) or future (30 days) visit within the authorizing provider's specialty     Patient has had an office visit with the authorizing provider or a provider within the authorizing providers department within the previous 12 mos or has a future within next 30 days. See \"Patient Info\" tab in inbasket, or \"Choose Columns\" in Meds & Orders section of the refill encounter.              Passed - Medication is active on med list           Jaymie Littlejohn RN on 4/10/2023 at 4:15 PM    "

## 2023-04-27 ENCOUNTER — TELEPHONE (OUTPATIENT)
Dept: FAMILY MEDICINE | Facility: CLINIC | Age: 63
End: 2023-04-27
Payer: COMMERCIAL

## 2023-04-27 NOTE — TELEPHONE ENCOUNTER
Anca RN Case manager from [facility] calling asking when patient's last appointment with PCP was.  RN told her 2/ 2020. She asked for upcoming appointment and 5/5/23 date was given to her.     Mindy REYESN, RN

## 2023-06-01 ENCOUNTER — HEALTH MAINTENANCE LETTER (OUTPATIENT)
Age: 63
End: 2023-06-01

## 2023-06-14 ENCOUNTER — TELEPHONE (OUTPATIENT)
Dept: FAMILY MEDICINE | Facility: CLINIC | Age: 63
End: 2023-06-14
Payer: COMMERCIAL

## 2023-06-14 NOTE — TELEPHONE ENCOUNTER
Patient Quality Outreach    Patient is due for the following:   Colon Cancer Screening  Breast Cancer Screening - Mammogram  Physical Annual Wellness Visit    Next Steps:   Patient has upcoming appointment, these items will be addressed at that time.    Type of outreach:    Chart review performed, no outreach needed.      Questions for provider review:    None           Beverley Horowitz

## 2023-06-19 ENCOUNTER — HOSPITAL ENCOUNTER (OUTPATIENT)
Dept: GENERAL RADIOLOGY | Facility: CLINIC | Age: 63
Discharge: HOME | End: 2023-06-19
Attending: PHYSICIAN ASSISTANT | Admitting: PHYSICIAN ASSISTANT
Payer: COMMERCIAL

## 2023-06-19 DIAGNOSIS — Z98.1 S/P SPINAL FUSION: ICD-10-CM

## 2023-06-19 PROCEDURE — 72190 X-RAY EXAM OF PELVIS: CPT

## 2023-06-27 ENCOUNTER — VIRTUAL VISIT (OUTPATIENT)
Dept: FAMILY MEDICINE | Facility: CLINIC | Age: 63
End: 2023-06-27
Payer: COMMERCIAL

## 2023-06-27 DIAGNOSIS — R06.02 SHORTNESS OF BREATH: Primary | ICD-10-CM

## 2023-06-27 DIAGNOSIS — R91.8 PULMONARY NODULES: ICD-10-CM

## 2023-06-27 PROCEDURE — 99213 OFFICE O/P EST LOW 20 MIN: CPT | Mod: VID | Performed by: FAMILY MEDICINE

## 2023-06-27 NOTE — PROGRESS NOTES
"Cris is a 63 year old who is being evaluated via a billable video visit.      How would you like to obtain your AVS? MyChart  If the video visit is dropped, the invitation should be resent by: Text to cell phone: [phone]  Will anyone else be joining your video visit? No        Assessment & Plan     Shortness of breath  Concern for COPD, plan lung function testing  - General PFT Lab (Please always keep checked)  - Pulmonary Function Test    Pulmonary nodules  Seen on 4/2023 CT in hospital for hypoxia after extubation  Plan recheck by 4/2024.         BMI:   Estimated body mass index is 38.55 kg/m  as calculated from the following:    Height as of 4/6/23: 1.638 m (5' 4.5\").    Weight as of 4/6/23: 103.5 kg (228 lb 1.6 oz).   Working on increasing activity after surgery    See Patient Instructions    Luca Glasgow MD  [facility]    Subjective   Cris is a 63 year old, presenting for the following health issues:  Post-Op - General Surgery (SI joint. Wanting to update Dr. Glasgow on everything.)        6/27/2023    12:37 PM   Additional Questions   Roomed by Beverley Horowitz MA   Accompanied by self         6/27/2023    12:37 PM   Patient Reported Additional Medications   Patient reports taking the following new medications none     History of Present Illness       Reason for visit:  Check in meds etc    She eats 4 or more servings of fruits and vegetables daily.She consumes 1 sweetened beverage(s) daily.She exercises with enough effort to increase her heart rate 9 or less minutes per day.  She exercises with enough effort to increase her heart rate 3 or less days per week.   She is taking medications regularly.     Chief Complaint   Patient presents with     Post-Op - General Surgery     SI joint. Wanting to update Dr. Glasgow on everything.     Hospitalized 4/17/23 for a minimally invasive SI joint fusion which is going well.  Will be doing Physical therapy in few weeks after cleared.  Had " acute post-op hyopxia which resolved thought to be COPD treated with prednisone and doxycycline but could have been vocal cord spasm too.    Last eye exam Feb 2023          Review of Systems   Constitutional, HEENT, cardiovascular, pulmonary, gi and gu systems are negative, except as otherwise noted.      Objective    Vitals - Patient Reported  Systolic (Patient Reported): 130  Diastolic (Patient Reported): 80  SpO2 (Patient Reported): 94  Temperature (Patient Reported): 99  F (37.2  C)  Pulse (Patient Reported): 75  Pain Score: Moderate Pain (4)  Pain Loc: Low Back        Physical Exam   GENERAL: Healthy, alert and no distress  EYES: Eyes grossly normal to inspection.  No discharge or erythema, or obvious scleral/conjunctival abnormalities.  RESP: No audible wheeze, cough, or visible cyanosis.  No visible retractions or increased work of breathing.    SKIN: Visible skin clear. No significant rash, abnormal pigmentation or lesions.  NEURO: Cranial nerves grossly intact.  Mentation and speech appropriate for age.  PSYCH: Mentation appears normal, affect normal/bright, judgement and insight intact, normal speech and appearance well-groomed.            Video-Visit Details    Type of service:  Video Visit     Originating Location (pt. Location): Home  Distant Location (provider location):  On-site  Platform used for Video Visit: [employer]

## 2023-08-17 DIAGNOSIS — F41.1 GAD (GENERALIZED ANXIETY DISORDER): ICD-10-CM

## 2023-08-23 RX ORDER — CLONAZEPAM 0.5 MG/1
TABLET ORAL
Qty: 45 TABLET | Refills: 1 | Status: SHIPPED | OUTPATIENT
Start: 2023-08-23 | End: 2023-10-17

## 2023-09-06 DIAGNOSIS — M53.3 SACROILIAC JOINT PAIN: Primary | ICD-10-CM

## 2023-09-06 DIAGNOSIS — M54.50 CHRONIC LOW BACK PAIN, UNSPECIFIED BACK PAIN LATERALITY, UNSPECIFIED WHETHER SCIATICA PRESENT: ICD-10-CM

## 2023-09-06 DIAGNOSIS — G89.29 CHRONIC LOW BACK PAIN, UNSPECIFIED BACK PAIN LATERALITY, UNSPECIFIED WHETHER SCIATICA PRESENT: ICD-10-CM

## 2023-09-18 DIAGNOSIS — J20.9 ACUTE BRONCHITIS WITH SYMPTOMS > 10 DAYS: ICD-10-CM

## 2023-09-18 NOTE — TELEPHONE ENCOUNTER
"Routing refill request to provider for review/approval because:  Provider to determine- originally prescribed for acute bronchitis            Requested Prescriptions   Pending Prescriptions Disp Refills    albuterol (PROAIR HFA/PROVENTIL HFA/VENTOLIN HFA) 108 (90 Base) MCG/ACT inhaler [Pharmacy Med Name: ALBUTEROL SULFATE  AERS] 8.5 g 4     Sig: INHALE 2 PUFFS INTO THE LUNGS EVERY 6 HOURS AS NEEDED FOR SHORTNESS OF BREATH / DYSPNEA OR WHEEZING       Asthma Maintenance Inhalers - Anticholinergics Passed - 9/18/2023  3:53 PM        Passed - Patient is age 12 years or older        Passed - Recent (12 mo) or future (30 days) visit within the authorizing provider's specialty     Patient has had an office visit with the authorizing provider or a provider within the authorizing providers department within the previous 12 mos or has a future within next 30 days. See \"Patient Info\" tab in inbasket, or \"Choose Columns\" in Meds & Orders section of the refill encounter.              Passed - Medication is active on med list       Short-Acting Beta Agonist Inhalers Protocol  Passed - 9/18/2023  3:53 PM        Passed - Patient is age 12 or older        Passed - Recent (12 mo) or future (30 days) visit within the authorizing provider's specialty     Patient has had an office visit with the authorizing provider or a provider within the authorizing providers department within the previous 12 mos or has a future within next 30 days. See \"Patient Info\" tab in inbasket, or \"Choose Columns\" in Meds & Orders section of the refill encounter.              Passed - Medication is active on med list                 Mara Andrew RN 09/18/23 5:24 PM   "

## 2023-09-20 RX ORDER — ALBUTEROL SULFATE 90 UG/1
2 AEROSOL, METERED RESPIRATORY (INHALATION) EVERY 6 HOURS PRN
Qty: 8.5 G | Refills: 4 | Status: SHIPPED | OUTPATIENT
Start: 2023-09-20 | End: 2024-04-05

## 2023-09-26 ENCOUNTER — APPOINTMENT (OUTPATIENT)
Dept: FAMILY MEDICINE | Facility: CLINIC | Age: 63
End: 2023-09-26
Payer: COMMERCIAL

## 2023-10-17 ENCOUNTER — VIRTUAL VISIT (OUTPATIENT)
Dept: FAMILY MEDICINE | Facility: CLINIC | Age: 63
End: 2023-10-17
Payer: COMMERCIAL

## 2023-10-17 ENCOUNTER — LAB (OUTPATIENT)
Dept: FAMILY MEDICINE | Facility: CLINIC | Age: 63
End: 2023-10-17

## 2023-10-17 DIAGNOSIS — F41.1 GAD (GENERALIZED ANXIETY DISORDER): Primary | ICD-10-CM

## 2023-10-17 DIAGNOSIS — Z12.11 SCREEN FOR COLON CANCER: ICD-10-CM

## 2023-10-17 PROCEDURE — 99213 OFFICE O/P EST LOW 20 MIN: CPT | Mod: VID | Performed by: FAMILY MEDICINE

## 2023-10-17 RX ORDER — CLONAZEPAM 0.5 MG/1
TABLET ORAL
Qty: 45 TABLET | Refills: 5 | Status: SHIPPED | OUTPATIENT
Start: 2023-10-17 | End: 2024-05-01

## 2023-10-17 ASSESSMENT — ANXIETY QUESTIONNAIRES
7. FEELING AFRAID AS IF SOMETHING AWFUL MIGHT HAPPEN: NOT AT ALL
GAD7 TOTAL SCORE: 1
5. BEING SO RESTLESS THAT IT IS HARD TO SIT STILL: NOT AT ALL
6. BECOMING EASILY ANNOYED OR IRRITABLE: NOT AT ALL
3. WORRYING TOO MUCH ABOUT DIFFERENT THINGS: NOT AT ALL
2. NOT BEING ABLE TO STOP OR CONTROL WORRYING: SEVERAL DAYS
IF YOU CHECKED OFF ANY PROBLEMS ON THIS QUESTIONNAIRE, HOW DIFFICULT HAVE THESE PROBLEMS MADE IT FOR YOU TO DO YOUR WORK, TAKE CARE OF THINGS AT HOME, OR GET ALONG WITH OTHER PEOPLE: NOT DIFFICULT AT ALL
GAD7 TOTAL SCORE: 1
1. FEELING NERVOUS, ANXIOUS, OR ON EDGE: NOT AT ALL

## 2023-10-17 ASSESSMENT — PATIENT HEALTH QUESTIONNAIRE - PHQ9
5. POOR APPETITE OR OVEREATING: NOT AT ALL
SUM OF ALL RESPONSES TO PHQ QUESTIONS 1-9: 1

## 2023-10-17 NOTE — PROGRESS NOTES
"Cris is a 63 year old who is being evaluated via a billable video visit.      How would you like to obtain your AVS? MyChart  If the video visit is dropped, the invitation should be resent by: Text to cell phone: [phone]  Will anyone else be joining your video visit? No          Assessment & Plan     EUGENIE (generalized anxiety disorder)  Stable, refill  - clonazePAM (KLONOPIN) 0.5 MG tablet; TAKE ONE-HALF TABLETS BY MOUTH THREE TIMES A DAY AS NEEDED FOR ANXIETY    Screen for colon cancer  - OSCAR(EXACT SCIENCES); Future       BMI:   Estimated body mass index is 38.55 kg/m  as calculated from the following:    Height as of 4/6/23: 1.638 m (5' 4.5\").    Weight as of 4/6/23: 103.5 kg (228 lb 1.6 oz).       See Patient Instructions    Luca Glasgow MD  [facility]    Subjective   Cris is a 63 year old, presenting for the following health issues:  Recheck Medication        10/17/2023     1:00 PM   Additional Questions   Roomed by Beverley Horowitz   Accompanied by self         10/17/2023     1:00 PM   Patient Reported Additional Medications   Patient reports taking the following new medications none       History of Present Illness       Reason for visit:  Meds, schedule all tests Bone scan, etc...Blood work.    She eats 2-3 servings of fruits and vegetables daily.She consumes 2 sweetened beverage(s) daily.She exercises with enough effort to increase her heart rate 9 or less minutes per day.  She exercises with enough effort to increase her heart rate 3 or less days per week.   She is taking medications regularly.   - Needs Bone scan and mammogram.     Depression and Anxiety Follow-Up  How are you doing with your depression since your last visit? No change  How are you doing with your anxiety since your last visit?  No change  Are you having other symptoms that might be associated with depression or anxiety? No  Have you had a significant life event? No   Do you have any concerns with " your use of alcohol or other drugs? No  Therapy: not currently  Hydroxyzine if needed for itching after the MS contin.  Social History     Tobacco Use    Smoking status: Former     Packs/day: 0.50     Years: 10.00     Additional pack years: 0.00     Total pack years: 5.00     Types: Cigarettes, Other     Quit date: 10/19/2022     Years since quittin.9    Smokeless tobacco: Current    Tobacco comments:     Use Electronic Cigarette   Vaping Use    Vaping Use: Some days    Substances: Nicotine   Substance Use Topics    Alcohol use: No     Alcohol/week: 0.0 standard drinks of alcohol    Drug use: No         2021     1:22 PM 2023     4:50 PM 2023     1:27 PM   PHQ   PHQ-9 Total Score 2 0 0    0   Q9: Thoughts of better off dead/self-harm past 2 weeks Not at all Not at all Not at all    Not at all         2021     1:22 PM 2023     4:50 PM 2023     1:28 PM   EUGENIE-7 SCORE   Total Score  1 (minimal anxiety) 2 (minimal anxiety)   Total Score 1 1 2    2         10/17/2023     1:01 PM   Last PHQ-9   1.  Little interest or pleasure in doing things 0   2.  Feeling down, depressed, or hopeless 1   3.  Trouble falling or staying asleep, or sleeping too much 0   4.  Feeling tired or having little energy 0   5.  Poor appetite or overeating 0   6.  Feeling bad about yourself 0   7.  Trouble concentrating 0   8.  Moving slowly or restless 0   Q9: Thoughts of better off dead/self-harm past 2 weeks 0   PHQ-9 Total Score 1   Difficulty at work, home, or with people Not difficult at all         10/17/2023     1:01 PM   EUGENIE-7    1. Feeling nervous, anxious, or on edge 0   2. Not being able to stop or control worrying 1   3. Worrying too much about different things 0   4. Trouble relaxing 0   5. Being so restless that it is hard to sit still 0   6. Becoming easily annoyed or irritable 0   7. Feeling afraid, as if something awful might happen 0   EUGENIE-7 Total Score 1   If you checked any problems, how difficult  have they made it for you to do your work, take care of things at home, or get along with other people? Not difficult at all       Suicide Assessment Five-step Evaluation and Treatment (SAFE-T)      Pain History:  When did you first notice your pain? Ongoing   Have you seen this provider for your pain in the past? Yes   Where in your body do you have pain? Lupus flares and low back pain. Lupus flares every 2 months. Taking prednisone 1mg daily.    Are you seeing anyone else for your pain? Yes - [facility]- Dr. Us  - Has surgery 4/2023 for SI joint fusion. Thing are going okay. It did help her.  -Will start PT at the end of this month.      12/21/2021     1:22 PM 2/1/2023     4:50 PM 4/28/2023     1:27 PM   PHQ-9 SCORE   PHQ-9 Total Score MyChart   0   PHQ-9 Total Score 2 0 0    0           12/21/2021     1:22 PM 2/1/2023     4:50 PM 4/28/2023     1:28 PM   EUGENIE-7 SCORE   Total Score  1 (minimal anxiety) 2 (minimal anxiety)   Total Score 1 1 2    2           2/1/2023     4:50 PM 4/28/2023     1:27 PM 10/17/2023     1:01 PM   PHQ-9 SCORE   PHQ-9 Total Score MyChart  0    PHQ-9 Total Score 0 0    0 1           2/1/2023     4:50 PM 4/28/2023     1:28 PM 10/17/2023     1:01 PM   EUGENIE-7 SCORE   Total Score 1 (minimal anxiety) 2 (minimal anxiety)    Total Score 1 2    2 1           2/1/2023     4:50 PM 10/17/2023     1:01 PM   PEG Score   PEG Total Score 5.33 7       PDMP Review         Value Time User    State PDMP site checked  Yes 1/12/2023  1:56 PM Luca Glasgow MD          Last CSA Agreement:   CSA -- Patient Level:    CSA: None found at the patient level.       Last UDS: 3/1/2014        Review of Systems         Objective           Vitals:  No vitals were obtained today due to virtual visit.    Physical Exam   GENERAL: Healthy, alert and no distress  EYES: Eyes grossly normal to inspection.  No discharge or erythema, or obvious scleral/conjunctival abnormalities.  RESP: No audible wheeze, cough, or visible  cyanosis.  No visible retractions or increased work of breathing.    SKIN: Visible skin clear. No significant rash, abnormal pigmentation or lesions.  NEURO: Cranial nerves grossly intact.  Mentation and speech appropriate for age.  PSYCH: Mentation appears normal, affect normal/bright, judgement and insight intact, normal speech and appearance well-groomed.            Video-Visit Details    Type of service:  Video Visit     Originating Location (pt. Location): Home    Distant Location (provider location):  On-site  Platform used for Video Visit: JaysonWell

## 2023-11-15 ENCOUNTER — TELEPHONE (OUTPATIENT)
Dept: FAMILY MEDICINE | Facility: CLINIC | Age: 63
End: 2023-11-15
Payer: COMMERCIAL

## 2023-11-15 NOTE — LETTER
November 15, 2023      Cris Priest  [address] [place] [address]      Dear Cris,     Your team at [facility] cares about your health. We have reviewed your chart and based on our findings; we are making the following recommendations to better manage your health.     You are in particular need of attention regarding the following:     Schedule Annual MAMMOGRAPHY. [facility] scheduling number is [phone] or schedule in MyChart (self referral).  1 in 8 women will develop invasive breast cancer during her lifetime and it is the most common non-skin cancer in [place]. EARLY detection, new treatments, and a better understanding of the disease have increased survival rates- the 5 year survival rate in the 1960's was 63% and today it is close to 90%.  Call or MyChart message your clinic to schedule a colonoscopy, schedule/ a FIT Test, or order a Cologuard test. If you are unsure what type of test you need, please call your clinic and speak to clinic staff.   Colon cancer is now the second leading cause of cancer-related deaths in the [place] for both men and women and there are over 130,000 new cases and 50,000 deaths per year from colon cancer. Colonoscopies can prevent 90-95% of these deaths. Problem lesions can be removed before they ever become cancer. This test is not only looking for cancer, but also getting rid of precancerous lesions.   PREVENTATIVE VISIT: Annual Medicare Wellness:Schedule an Annual Medicare Wellness Exam. Please call your [facility] clinic to set up your appointment.    If you have already completed these items, please contact the clinic via phone or   AYLIENhart so your care team can review and update your records. Thank you for   choosing [facility] for your healthcare needs. For any questions,   concerns, or to schedule an appointment please contact our clinic.    Healthy Regards,      Your [facility] Care  Team

## 2023-11-15 NOTE — TELEPHONE ENCOUNTER
Patient Quality Outreach    Patient is due for the following:   Colon Cancer Screening  Breast Cancer Screening - Mammogram  Physical Annual Wellness Visit    Next Steps:   Schedule a Annual Wellness Visit  Patient needs to complete a mammogram and colon cancer screening.    Type of outreach:    Sent letter.      Questions for provider review:    None           Beverley Horowitz

## 2023-11-19 ENCOUNTER — HEALTH MAINTENANCE LETTER (OUTPATIENT)
Age: 63
End: 2023-11-19

## 2023-11-25 ENCOUNTER — MYC MEDICAL ADVICE (OUTPATIENT)
Dept: FAMILY MEDICINE | Facility: CLINIC | Age: 63
End: 2023-11-25
Payer: COMMERCIAL

## 2023-11-27 ENCOUNTER — THERAPY VISIT (OUTPATIENT)
Dept: PHYSICAL THERAPY | Facility: CLINIC | Age: 63
End: 2023-11-27
Attending: FAMILY MEDICINE
Payer: COMMERCIAL

## 2023-11-27 DIAGNOSIS — M54.50 CHRONIC LOW BACK PAIN, UNSPECIFIED BACK PAIN LATERALITY, UNSPECIFIED WHETHER SCIATICA PRESENT: ICD-10-CM

## 2023-11-27 DIAGNOSIS — M54.40 CHRONIC BILATERAL LOW BACK PAIN WITH SCIATICA: Primary | ICD-10-CM

## 2023-11-27 DIAGNOSIS — G89.29 CHRONIC BILATERAL LOW BACK PAIN WITH SCIATICA: Primary | ICD-10-CM

## 2023-11-27 DIAGNOSIS — M53.3 SACROILIAC JOINT PAIN: ICD-10-CM

## 2023-11-27 DIAGNOSIS — G89.29 CHRONIC LOW BACK PAIN, UNSPECIFIED BACK PAIN LATERALITY, UNSPECIFIED WHETHER SCIATICA PRESENT: ICD-10-CM

## 2023-11-27 PROBLEM — M54.41 CHRONIC BILATERAL LOW BACK PAIN WITH SCIATICA: Status: ACTIVE | Noted: 2023-11-27

## 2023-11-27 PROBLEM — M54.42 CHRONIC BILATERAL LOW BACK PAIN WITH SCIATICA: Status: ACTIVE | Noted: 2023-11-27

## 2023-11-27 PROCEDURE — 97110 THERAPEUTIC EXERCISES: CPT | Mod: GP | Performed by: PHYSICAL THERAPIST

## 2023-11-27 PROCEDURE — 97163 PT EVAL HIGH COMPLEX 45 MIN: CPT | Mod: GP | Performed by: PHYSICAL THERAPIST

## 2023-11-27 NOTE — PROGRESS NOTES
[facility]                                                                                   OUTPATIENT PHYSICAL THERAPY    PLAN OF TREATMENT FOR OUTPATIENT REHABILITATION   Patient's Last Name, First Name, Cris Remy YOB: 1960   Provider's Name   [facility]   Medical Record No.  [id number]     Onset Date: 05/27/23  Start of Care Date: 11/27/23     Medical Diagnosis:  chronic LBP with sciatica      PT Treatment Diagnosis:  LBP Plan of Treatment  Frequency/Duration: 1x/wk/ 8wk    Certification date from 11/27/23 to 01/22/24         See note for plan of treatment details and functional goals     Ivan Guthrie, PT                         I CERTIFY THE NEED FOR THESE SERVICES FURNISHED UNDER        THIS PLAN OF TREATMENT AND WHILE UNDER MY CARE     (Physician attestation of this document indicates review and certification of the therapy plan).              Referring Provider:  Luca Glasgow    Initial Assessment  See Epic Evaluation- Start of Care Date: 11/27/23

## 2023-11-29 NOTE — TELEPHONE ENCOUNTER
Form completed, signed, and faxed to [facility] at [phone]. Copy sent to scan and copy placed in cabinet (front gray section). Patient notified of status of forms via My Chart.

## 2024-03-03 DIAGNOSIS — F41.1 GAD (GENERALIZED ANXIETY DISORDER): ICD-10-CM

## 2024-03-03 DIAGNOSIS — F33.41 RECURRENT MAJOR DEPRESSIVE DISORDER, IN PARTIAL REMISSION (H): ICD-10-CM

## 2024-03-03 DIAGNOSIS — M32.9 SYSTEMIC LUPUS ERYTHEMATOSUS, UNSPECIFIED SLE TYPE, UNSPECIFIED ORGAN INVOLVEMENT STATUS (H): ICD-10-CM

## 2024-03-05 RX ORDER — PREDNISONE 1 MG/1
1 TABLET ORAL DAILY
Qty: 90 TABLET | Refills: 0 | Status: SHIPPED | OUTPATIENT
Start: 2024-03-05

## 2024-03-05 RX ORDER — SERTRALINE HYDROCHLORIDE 100 MG/1
200 TABLET, FILM COATED ORAL DAILY
Qty: 180 TABLET | Refills: 0 | Status: SHIPPED | OUTPATIENT
Start: 2024-03-05 | End: 2024-04-03

## 2024-03-05 RX ORDER — AMITRIPTYLINE HYDROCHLORIDE 100 MG/1
100 TABLET ORAL AT BEDTIME
Qty: 90 TABLET | Refills: 0 | Status: SHIPPED | OUTPATIENT
Start: 2024-03-05 | End: 2024-06-20

## 2024-03-05 RX ORDER — HYDROXYCHLOROQUINE SULFATE 200 MG/1
600 TABLET, FILM COATED ORAL DAILY
Qty: 90 TABLET | Refills: 1 | Status: SHIPPED | OUTPATIENT
Start: 2024-03-05 | End: 2024-04-03

## 2024-03-07 DIAGNOSIS — M32.9 SYSTEMIC LUPUS ERYTHEMATOSUS, UNSPECIFIED SLE TYPE, UNSPECIFIED ORGAN INVOLVEMENT STATUS (H): ICD-10-CM

## 2024-03-08 RX ORDER — AZATHIOPRINE 50 MG/1
50 TABLET ORAL 3 TIMES DAILY
Qty: 90 TABLET | Refills: 0 | Status: SHIPPED | OUTPATIENT
Start: 2024-03-08 | End: 2024-04-03

## 2024-03-28 ENCOUNTER — HOSPITAL ENCOUNTER (OUTPATIENT)
Facility: AMBULATORY SURGERY CENTER | Age: 64
End: 2024-03-28
Attending: ANESTHESIOLOGY
Payer: COMMERCIAL

## 2024-04-03 ENCOUNTER — OFFICE VISIT (OUTPATIENT)
Dept: FAMILY MEDICINE | Facility: CLINIC | Age: 64
End: 2024-04-03
Payer: COMMERCIAL

## 2024-04-03 VITALS
HEART RATE: 74 BPM | OXYGEN SATURATION: 94 % | RESPIRATION RATE: 20 BRPM | DIASTOLIC BLOOD PRESSURE: 74 MMHG | WEIGHT: 206.4 LBS | BODY MASS INDEX: 34.39 KG/M2 | TEMPERATURE: 98.2 F | SYSTOLIC BLOOD PRESSURE: 124 MMHG | HEIGHT: 65 IN

## 2024-04-03 DIAGNOSIS — M32.9 SYSTEMIC LUPUS ERYTHEMATOSUS, UNSPECIFIED SLE TYPE, UNSPECIFIED ORGAN INVOLVEMENT STATUS (H): Primary | ICD-10-CM

## 2024-04-03 DIAGNOSIS — R73.01 IMPAIRED FASTING GLUCOSE: ICD-10-CM

## 2024-04-03 DIAGNOSIS — Z23 NEED FOR DIPHTHERIA-TETANUS-PERTUSSIS (TDAP) VACCINE: ICD-10-CM

## 2024-04-03 DIAGNOSIS — Z23 NEED FOR COVID-19 VACCINE: ICD-10-CM

## 2024-04-03 DIAGNOSIS — Z11.59 NEED FOR HEPATITIS C SCREENING TEST: ICD-10-CM

## 2024-04-03 DIAGNOSIS — M45.9 ANKYLOSING SPONDYLITIS, UNSPECIFIED SITE OF SPINE (H): ICD-10-CM

## 2024-04-03 DIAGNOSIS — E78.5 HYPERLIPIDEMIA LDL GOAL <130: ICD-10-CM

## 2024-04-03 DIAGNOSIS — J44.9 CHRONIC OBSTRUCTIVE PULMONARY DISEASE, UNSPECIFIED COPD TYPE (H): ICD-10-CM

## 2024-04-03 DIAGNOSIS — Z12.31 VISIT FOR SCREENING MAMMOGRAM: ICD-10-CM

## 2024-04-03 DIAGNOSIS — F33.41 RECURRENT MAJOR DEPRESSIVE DISORDER, IN PARTIAL REMISSION (H): ICD-10-CM

## 2024-04-03 DIAGNOSIS — Z12.11 SPECIAL SCREENING FOR MALIGNANT NEOPLASMS, COLON: ICD-10-CM

## 2024-04-03 DIAGNOSIS — M81.8 OTHER OSTEOPOROSIS WITHOUT CURRENT PATHOLOGICAL FRACTURE: ICD-10-CM

## 2024-04-03 DIAGNOSIS — I10 ESSENTIAL HYPERTENSION: ICD-10-CM

## 2024-04-03 DIAGNOSIS — Z11.4 SCREENING FOR HIV (HUMAN IMMUNODEFICIENCY VIRUS): ICD-10-CM

## 2024-04-03 DIAGNOSIS — Z23 NEED FOR VACCINATION AGAINST STREPTOCOCCUS PNEUMONIAE: ICD-10-CM

## 2024-04-03 DIAGNOSIS — F41.1 GAD (GENERALIZED ANXIETY DISORDER): ICD-10-CM

## 2024-04-03 LAB
ALBUMIN SERPL BCG-MCNC: 3.9 G/DL (ref 3.5–5.2)
ALP SERPL-CCNC: 69 U/L (ref 40–150)
ALT SERPL W P-5'-P-CCNC: 11 U/L (ref 0–50)
ANION GAP SERPL CALCULATED.3IONS-SCNC: 10 MMOL/L (ref 7–15)
AST SERPL W P-5'-P-CCNC: 17 U/L (ref 0–45)
BILIRUB SERPL-MCNC: 0.5 MG/DL
BUN SERPL-MCNC: 13.5 MG/DL (ref 8–23)
CALCIUM SERPL-MCNC: 9.4 MG/DL (ref 8.8–10.2)
CHLORIDE SERPL-SCNC: 96 MMOL/L (ref 98–107)
CHOLEST SERPL-MCNC: 201 MG/DL
CREAT SERPL-MCNC: 0.77 MG/DL (ref 0.51–0.95)
DEPRECATED HCO3 PLAS-SCNC: 30 MMOL/L (ref 22–29)
EGFRCR SERPLBLD CKD-EPI 2021: 85 ML/MIN/1.73M2
ERYTHROCYTE [DISTWIDTH] IN BLOOD BY AUTOMATED COUNT: 14.2 % (ref 10–15)
FASTING STATUS PATIENT QL REPORTED: YES
GLUCOSE SERPL-MCNC: 89 MG/DL (ref 70–99)
HBA1C MFR BLD: 5.7 % (ref 0–5.6)
HCT VFR BLD AUTO: 49.7 % (ref 35–47)
HDLC SERPL-MCNC: 69 MG/DL
HGB BLD-MCNC: 14.9 G/DL (ref 11.7–15.7)
LDLC SERPL CALC-MCNC: 120 MG/DL
MCH RBC QN AUTO: 28 PG (ref 26.5–33)
MCHC RBC AUTO-ENTMCNC: 30 G/DL (ref 31.5–36.5)
MCV RBC AUTO: 93 FL (ref 78–100)
NONHDLC SERPL-MCNC: 132 MG/DL
PLATELET # BLD AUTO: 213 10E3/UL (ref 150–450)
POTASSIUM SERPL-SCNC: 4.4 MMOL/L (ref 3.4–5.3)
PROT SERPL-MCNC: 8.1 G/DL (ref 6.4–8.3)
RBC # BLD AUTO: 5.33 10E6/UL (ref 3.8–5.2)
SODIUM SERPL-SCNC: 136 MMOL/L (ref 135–145)
TRIGL SERPL-MCNC: 60 MG/DL
WBC # BLD AUTO: 8.4 10E3/UL (ref 4–11)

## 2024-04-03 PROCEDURE — 99214 OFFICE O/P EST MOD 30 MIN: CPT | Mod: 25 | Performed by: FAMILY MEDICINE

## 2024-04-03 PROCEDURE — 86803 HEPATITIS C AB TEST: CPT | Performed by: FAMILY MEDICINE

## 2024-04-03 PROCEDURE — 85027 COMPLETE CBC AUTOMATED: CPT | Performed by: FAMILY MEDICINE

## 2024-04-03 PROCEDURE — 80053 COMPREHEN METABOLIC PANEL: CPT | Performed by: FAMILY MEDICINE

## 2024-04-03 PROCEDURE — 36415 COLL VENOUS BLD VENIPUNCTURE: CPT | Performed by: FAMILY MEDICINE

## 2024-04-03 PROCEDURE — 96127 BRIEF EMOTIONAL/BEHAV ASSMT: CPT | Performed by: FAMILY MEDICINE

## 2024-04-03 PROCEDURE — 90677 PCV20 VACCINE IM: CPT | Performed by: FAMILY MEDICINE

## 2024-04-03 PROCEDURE — 90471 IMMUNIZATION ADMIN: CPT | Performed by: FAMILY MEDICINE

## 2024-04-03 PROCEDURE — 80061 LIPID PANEL: CPT | Performed by: FAMILY MEDICINE

## 2024-04-03 PROCEDURE — 91320 SARSCV2 VAC 30MCG TRS-SUC IM: CPT | Performed by: FAMILY MEDICINE

## 2024-04-03 PROCEDURE — 90480 ADMN SARSCOV2 VAC 1/ONLY CMP: CPT | Performed by: FAMILY MEDICINE

## 2024-04-03 PROCEDURE — 87389 HIV-1 AG W/HIV-1&-2 AB AG IA: CPT | Performed by: FAMILY MEDICINE

## 2024-04-03 PROCEDURE — 90472 IMMUNIZATION ADMIN EACH ADD: CPT | Performed by: FAMILY MEDICINE

## 2024-04-03 PROCEDURE — 90715 TDAP VACCINE 7 YRS/> IM: CPT | Performed by: FAMILY MEDICINE

## 2024-04-03 PROCEDURE — 83036 HEMOGLOBIN GLYCOSYLATED A1C: CPT | Performed by: FAMILY MEDICINE

## 2024-04-03 RX ORDER — SERTRALINE HYDROCHLORIDE 100 MG/1
200 TABLET, FILM COATED ORAL DAILY
Qty: 180 TABLET | Refills: 3 | Status: SHIPPED | OUTPATIENT
Start: 2024-04-03

## 2024-04-03 RX ORDER — HYDROXYCHLOROQUINE SULFATE 200 MG/1
600 TABLET, FILM COATED ORAL DAILY
Qty: 90 TABLET | Refills: 5 | Status: SHIPPED | OUTPATIENT
Start: 2024-04-03

## 2024-04-03 RX ORDER — AZATHIOPRINE 50 MG/1
50 TABLET ORAL 3 TIMES DAILY
Qty: 90 TABLET | Refills: 5 | Status: SHIPPED | OUTPATIENT
Start: 2024-04-03

## 2024-04-03 ASSESSMENT — ANXIETY QUESTIONNAIRES
IF YOU CHECKED OFF ANY PROBLEMS ON THIS QUESTIONNAIRE, HOW DIFFICULT HAVE THESE PROBLEMS MADE IT FOR YOU TO DO YOUR WORK, TAKE CARE OF THINGS AT HOME, OR GET ALONG WITH OTHER PEOPLE: NOT DIFFICULT AT ALL
2. NOT BEING ABLE TO STOP OR CONTROL WORRYING: NOT AT ALL
1. FEELING NERVOUS, ANXIOUS, OR ON EDGE: SEVERAL DAYS
7. FEELING AFRAID AS IF SOMETHING AWFUL MIGHT HAPPEN: NOT AT ALL
7. FEELING AFRAID AS IF SOMETHING AWFUL MIGHT HAPPEN: NOT AT ALL
GAD7 TOTAL SCORE: 1
3. WORRYING TOO MUCH ABOUT DIFFERENT THINGS: NOT AT ALL
8. IF YOU CHECKED OFF ANY PROBLEMS, HOW DIFFICULT HAVE THESE MADE IT FOR YOU TO DO YOUR WORK, TAKE CARE OF THINGS AT HOME, OR GET ALONG WITH OTHER PEOPLE?: NOT DIFFICULT AT ALL
6. BECOMING EASILY ANNOYED OR IRRITABLE: NOT AT ALL
4. TROUBLE RELAXING: NOT AT ALL
5. BEING SO RESTLESS THAT IT IS HARD TO SIT STILL: NOT AT ALL

## 2024-04-03 ASSESSMENT — PATIENT HEALTH QUESTIONNAIRE - PHQ9
SUM OF ALL RESPONSES TO PHQ QUESTIONS 1-9: 1
SUM OF ALL RESPONSES TO PHQ QUESTIONS 1-9: 1
10. IF YOU CHECKED OFF ANY PROBLEMS, HOW DIFFICULT HAVE THESE PROBLEMS MADE IT FOR YOU TO DO YOUR WORK, TAKE CARE OF THINGS AT HOME, OR GET ALONG WITH OTHER PEOPLE: NOT DIFFICULT AT ALL

## 2024-04-03 ASSESSMENT — PAIN SCALES - GENERAL: PAINLEVEL: EXTREME PAIN (8)

## 2024-04-03 NOTE — NURSING NOTE
Prior to immunization administration, verified patients identity using patient s name and date of birth. Please see Immunization Activity for additional information.     Screening Questionnaire for Adult Immunization    Are you sick today?   No   Do you have allergies to medications, food, a vaccine component or latex?   No   Have you ever had a serious reaction after receiving a vaccination?   No   Do you have a long-term health problem with heart, lung, kidney, or metabolic disease (e.g., diabetes), asthma, a blood disorder, no spleen, complement component deficiency, a cochlear implant, or a spinal fluid leak?  Are you on long-term aspirin therapy?   No   Do you have cancer, leukemia, HIV/AIDS, or any other immune system problem?   Yes   Do you have a parent, brother, or sister with an immune system problem?   No   In the past 3 months, have you taken medications that affect  your immune system, such as prednisone, other steroids, or anticancer drugs; drugs for the treatment of rheumatoid arthritis, Crohn s disease, or psoriasis; or have you had radiation treatments?   Yes   Have you had a seizure, or a brain or other nervous system problem?   No   During the past year, have you received a transfusion of blood or blood    products, or been given immune (gamma) globulin or antiviral drug?   No   For women: Are you pregnant or is there a chance you could become       pregnant during the next month?   No   Have you received any vaccinations in the past 4 weeks?   No     Immunization questionnaire was positive for at least one answer.  Notified Dr. Glasgow.      Patient instructed to remain in clinic for 15 minutes afterwards, and to report any adverse reactions.     Screening performed by Beverley Horowitz on 4/3/2024 at 5:15 PM.

## 2024-04-03 NOTE — PROGRESS NOTES
Assessment & Plan     Systemic lupus erythematosus, unspecified SLE type, unspecified organ involvement status (H)  Stable, has been stable for 10 years without seeing Rheumatology, due to access.  Plan to refer to rheumatology as we have one at this location again, to assess medications.  Due for labs  - CBC with platelets; Future  - azaTHIOprine (IMURAN) 50 MG tablet; Take 1 tablet (50 mg) by mouth 3 times daily  - hydroxychloroquine (PLAQUENIL) 200 MG tablet; Take 3 tablets (600 mg) by mouth daily  - Adult Rheumatology  Referral; Future  - CBC with platelets    Ankylosing spondylitis, unspecified site of spine (H)  Working with Dr. Forte, having procedure coming up.  Known issue that I take into account for their medical decisions, no current exacerbations or new concerns      Recurrent major depressive disorder, in partial remission (H24)  Stable, refill  - sertraline (ZOLOFT) 100 MG tablet; Take 2 tablets (200 mg) by mouth daily    EUGENIE (generalized anxiety disorder)  Stable, continue clonazepam    Chronic obstructive pulmonary disease, unspecified COPD type (H): mild  Discussed that she certainly had COPD as she has needed inhalers with past upper respiratory illnesses and did smoke and is still exposed to second hand smoke.  Plan LAMA-LABA treatment  - General PFT Lab (Please always keep checked); Future  - Pulmonary Function Test; Future  - umeclidinium-vilanterol (ANORO ELLIPTA) 62.5-25 MCG/ACT oral inhaler; Inhale 1 puff into the lungs daily    Screening for HIV (human immunodeficiency virus)  - HIV Antigen Antibody Combo; Future  - HIV Antigen Antibody Combo    Need for hepatitis C screening test  - Hepatitis C Screen Reflex to HCV RNA Quant and Genotype; Future  - Hepatitis C Screen Reflex to HCV RNA Quant and Genotype    Other osteoporosis without current pathological fracture  Discussed, was on injectable Prolia for a time, needing recheck to assess ongoing treatment recommendations  - DEXA  "HIP/PELVIS/SPINE - Future; Future    Visit for screening mammogram  - MA SCREENING DIGITAL BILAT - Future  (s+30); Future    Hyperlipidemia LDL goal <130  - Lipid panel reflex to direct LDL Fasting; Future  - Lipid panel reflex to direct LDL Fasting    Essential hypertension  Stable, recheck labs  - Comprehensive metabolic panel (BMP + Alb, Alk Phos, ALT, AST, Total. Bili, TP); Future  - Comprehensive metabolic panel (BMP + Alb, Alk Phos, ALT, AST, Total. Bili, TP)    Special screening for malignant neoplasms, colon  - Fecal colorectal cancer screen (FIT); Future    Impaired fasting glucose  - Hemoglobin A1c; Future  - Hemoglobin A1c    BMI 34.0-34.9,adult  Has been working to lose weight to improve back pain and has been successful  Continue current healthy eating plan    Need for COVID-19 vaccine  - COVID-19 12+ (2023-24) (PFIZER)    Need for diphtheria-tetanus-pertussis (Tdap) vaccine  - TDAP 10-64Y (ADACEL,BOOSTRIX)    Need for vaccination against Streptococcus pneumoniae  - Pneumococcal 20 Valent Conjugate (PCV20)          BMI  Estimated body mass index is 34.88 kg/m  as calculated from the following:    Height as of this encounter: 1.638 m (5' 4.5\").    Weight as of this encounter: 93.6 kg (206 lb 6.4 oz).   Weight management plan: Discussed healthy diet and exercise guidelines      See Patient Instructions    Robby Lynch is a 63 year old, presenting for the following health issues:  Recheck Medication (Blood work)      4/3/2024     4:23 PM   Additional Questions   Roomed by Beverley Horowitz   Accompanied by self         4/3/2024     4:23 PM   Patient Reported Additional Medications   Patient reports taking the following new medications none     History of Present Illness       Reason for visit:  Follow uo meds and needs bloodwork    She eats 2-3 servings of fruits and vegetables daily.She consumes 1 sweetened beverage(s) daily.She exercises with enough effort to increase her heart rate 9 or less " minutes per day.  She exercises with enough effort to increase her heart rate 3 or less days per week.   She is taking medications regularly.     Osteoporosis: on reclast  in the past, 3-4 years ago.    Depression/Anxiety  How are you doing with your depression since your last visit? Stable  Are you having other symptoms that might be associated with depression? No  Have you had a significant life event?  No   Are you feeling anxious or having panic attacks?   No  Do you have any concerns with your use of alcohol or other drugs? No    Social History     Tobacco Use    Smoking status: Former     Packs/day: 0.50     Years: 10.00     Additional pack years: 0.00     Total pack years: 5.00     Types: Cigarettes, Other     Quit date: 10/19/2022     Years since quittin.4    Smokeless tobacco: Current    Tobacco comments:     Use Electronic Cigarette   Vaping Use    Vaping Use: Some days    Substances: Nicotine   Substance Use Topics    Alcohol use: No     Alcohol/week: 0.0 standard drinks of alcohol    Drug use: No         2023     1:27 PM 10/17/2023     1:01 PM 4/3/2024     4:22 PM   PHQ   PHQ-9 Total Score 0    0 1 1   Q9: Thoughts of better off dead/self-harm past 2 weeks Not at all Not at all Not at all         2023     1:28 PM 10/17/2023     1:01 PM 4/3/2024     4:22 PM   EUGENIE-7 SCORE   Total Score 2 (minimal anxiety)  1 (minimal anxiety)   Total Score 2    2 1 1         Suicide Assessment Five-step Evaluation and Treatment (SAFE-T)      Pain History:  When did you first notice your pain? Ongoing for 6 years   Have you seen this provider for your pain in the past? Yes   Where in your body do you have pain? Low back pain  Are you seeing anyone else for your pain? Yes - [facility]- Dr. Forte        2023     1:27 PM 10/17/2023     1:01 PM 4/3/2024     4:22 PM   PHQ-9 SCORE   PHQ-9 Total Score MyChart 0  1 (Minimal depression)   PHQ-9 Total Score 0    0 1 1           2023     1:28 PM 10/17/2023      "1:01 PM 4/3/2024     4:22 PM   EUGENIE-7 SCORE   Total Score 2 (minimal anxiety)  1 (minimal anxiety)   Total Score 2    2 1 1           4/28/2023     1:27 PM 10/17/2023     1:01 PM 4/3/2024     4:22 PM   PHQ-9 SCORE   PHQ-9 Total Score MyChart 0  1 (Minimal depression)   PHQ-9 Total Score 0    0 1 1           4/28/2023     1:28 PM 10/17/2023     1:01 PM 4/3/2024     4:22 PM   EUGENIE-7 SCORE   Total Score 2 (minimal anxiety)  1 (minimal anxiety)   Total Score 2    2 1 1           2/1/2023     4:50 PM 10/17/2023     1:01 PM 4/3/2024     4:32 PM   PEG Score   PEG Total Score 5.33 7 4.67         PDMP Review         Value Time User    State PDMP site checked  Yes 1/12/2023  1:56 PM Luca Glasgow MD          Last CSA Agreement:   CSA -- Patient Level:    CSA: None found at the patient level.       Last UDS: 3/1/2014        Medication Followup of Hydroxychloroquine and Imuran for SLE  No skin changes, no joint swelling recently, joints do ache in the morning or after overdoing housework.  Taking Medication as prescribed: yes  Side Effects:  None  Medication Helping Symptoms:  yes          Objective    /74   Pulse 74   Temp 98.2  F (36.8  C) (Tympanic)   Resp 20   Ht 1.638 m (5' 4.5\")   Wt 93.6 kg (206 lb 6.4 oz)   SpO2 94%   BMI 34.88 kg/m    Body mass index is 34.88 kg/m .  Physical Exam   GENERAL: alert and no distress  RESP: lungs clear to auscultation - no rales, rhonchi or wheezes  CV: regular rate and rhythm, normal S1 S2, no S3 or S4, no murmur, click or rub, lower extremity edema up to knee mild bilateral  MS: extremities normal- no gross deformities noted  PSYCH: mentation appears normal, affect normal/bright            Signed Electronically by: Luca Glasgow MD    "

## 2024-04-04 DIAGNOSIS — J20.9 ACUTE BRONCHITIS WITH SYMPTOMS > 10 DAYS: ICD-10-CM

## 2024-04-04 LAB
HCV AB SERPL QL IA: NONREACTIVE
HIV 1+2 AB+HIV1 P24 AG SERPL QL IA: NONREACTIVE

## 2024-04-05 RX ORDER — ALBUTEROL SULFATE 90 UG/1
2 AEROSOL, METERED RESPIRATORY (INHALATION) EVERY 6 HOURS PRN
Qty: 8.5 G | Refills: 1 | Status: SHIPPED | OUTPATIENT
Start: 2024-04-05

## 2024-04-05 NOTE — TELEPHONE ENCOUNTER
Prescription approved per [facility] Refill Protocol     Debra Cedeño     RN MSN

## 2024-04-09 RX ORDER — SODIUM CHLORIDE, SODIUM LACTATE, POTASSIUM CHLORIDE, CALCIUM CHLORIDE 600; 310; 30; 20 MG/100ML; MG/100ML; MG/100ML; MG/100ML
INJECTION, SOLUTION INTRAVENOUS CONTINUOUS
Status: CANCELLED | OUTPATIENT
Start: 2024-04-09

## 2024-04-09 RX ORDER — LIDOCAINE 40 MG/G
CREAM TOPICAL
Status: CANCELLED | OUTPATIENT
Start: 2024-04-09

## 2024-04-10 ENCOUNTER — TELEPHONE (OUTPATIENT)
Dept: FAMILY MEDICINE | Facility: CLINIC | Age: 64
End: 2024-04-10
Payer: COMMERCIAL

## 2024-04-10 NOTE — TELEPHONE ENCOUNTER
Patient Quality Outreach    Patient is due for the following:   Colon Cancer Screening  Breast Cancer Screening - Mammogram    Next Steps:   No follow up needed at this time.  Patient was given a fit test at last OV last week. Needs to complete fit test and mail in. Patient needs to schedule a mammogram.    Type of outreach:    Sent QuickProNotest message.      Questions for provider review:    None           Beverley Horowitz

## 2024-04-30 DIAGNOSIS — F41.1 GAD (GENERALIZED ANXIETY DISORDER): ICD-10-CM

## 2024-05-01 RX ORDER — CLONAZEPAM 0.5 MG/1
TABLET ORAL
Qty: 45 TABLET | Refills: 1 | Status: SHIPPED | OUTPATIENT
Start: 2024-05-01 | End: 2024-07-02

## 2024-05-26 ENCOUNTER — MYC MEDICAL ADVICE (OUTPATIENT)
Dept: FAMILY MEDICINE | Facility: CLINIC | Age: 64
End: 2024-05-26
Payer: COMMERCIAL

## 2024-06-16 ENCOUNTER — HEALTH MAINTENANCE LETTER (OUTPATIENT)
Age: 64
End: 2024-06-16

## 2024-06-20 DIAGNOSIS — F41.1 GAD (GENERALIZED ANXIETY DISORDER): ICD-10-CM

## 2024-06-20 RX ORDER — AMITRIPTYLINE HYDROCHLORIDE 100 MG/1
100 TABLET ORAL AT BEDTIME
Qty: 90 TABLET | Refills: 0 | Status: SHIPPED | OUTPATIENT
Start: 2024-06-20 | End: 2024-10-01

## 2024-06-20 NOTE — TELEPHONE ENCOUNTER
Pending Prescriptions:                       Disp   Refills    amitriptyline (ELAVIL) 100 MG tablet [Pha*90 tab*0            Sig: TAKE ONE TABLET BY MOUTH ONCE DAILY AT BEDTIME    Routing refill request to provider for review/approval because:  PHQ-9 is only required if the requested medication is prescribed for depression or bipolar depression    EUGENIE-7 score of less than 5 in past 12 months.       Dylon De Leon RN

## 2024-07-01 DIAGNOSIS — F41.1 GAD (GENERALIZED ANXIETY DISORDER): ICD-10-CM

## 2024-07-02 RX ORDER — CLONAZEPAM 0.5 MG/1
TABLET ORAL
Qty: 45 TABLET | Refills: 1 | Status: SHIPPED | OUTPATIENT
Start: 2024-07-02 | End: 2024-08-30

## 2024-07-03 ENCOUNTER — MYC MEDICAL ADVICE (OUTPATIENT)
Dept: FAMILY MEDICINE | Facility: CLINIC | Age: 64
End: 2024-07-03
Payer: COMMERCIAL

## 2024-07-03 DIAGNOSIS — J44.9 CHRONIC OBSTRUCTIVE PULMONARY DISEASE, UNSPECIFIED COPD TYPE (H): ICD-10-CM

## 2024-07-18 NOTE — PROGRESS NOTES
PHYSICAL THERAPY EVALUATION  Type of Visit: Evaluation    See electronic medical record for Abuse and Falls Screening details.    Subjective       Presenting condition or subjective complaint: Had SI Joint Stabilization Fusion Surgery roughly 24 procedures since 201824  Date of onset: 05/27/23    Relevant medical history: Asthma; Depression; Dizziness; Fibromyalgia; High blood pressure; History of fractures; Implanted device; Incontinence; Osteoporosis; Pain at night or rest; Rheumatoid arthritis; Significant weakness   Dates & types of surgery: 2018 till 2023  Fusions l3-5 she thinks and her SI fused in April.  Has had 25 procedures.  Had 3 falls in the past week and a half.  Had PT in the past.  Bilat Tks.  States she is tuff.  Gets light headed.  Has quad atrophy.  Had low O2 after her surgery.  She states she is tuff.      Prior diagnostic imaging/testing results: MRI; CT scan; X-ray     Prior therapy history for the same diagnosis, illness or injury: No      Prior Level of Function  Transfers: Assistive equipment  Ambulation: Assistive equipment walker  ADL: Assistive equipment 4ww  IADL:     Living Environment  Social support: With a significant other or spouse   Type of home: House; Multi-level   Stairs to enter the home: Yes 2 Is there a railing: No   Ramp: No   Stairs inside the home: Yes 16 Is there a railing: Yes   Help at home: None  Equipment owned: Straight Cane; Walker with wheels; Bedrail; Commode     Employment: No    Hobbies/Interests: [employer] [place]    Patient goals for therapy: Need to be able to stand and walk.    Pain assessment: 0/10 pain sitting now, more standing the more she stands.  Needs to build her legs and endurance.     Objective   LUMBAR SPINE EVALUATION  PAIN:   INTEGUMENTARY (edema, incisions):  closed , L VMO pronounced atrophy.    POSTURE:  at lean at hips standing, ant pelvic tilt  GAIT:   Weightbearing Status: WBAT  Assistive Device(s):   Gait Deviations:  Pre op instructions reviewed with pt over telephone, verbalized understanding.    To confirm, Surgery is scheduled on 7/22/24. We will call you after 2pm the day before Surgery with your arrival time.    *Please report to the [facility] (1st Floor) located off of [facility] (2nd Entrance/Building on the left, in front of the flag pole).  Address: [address] [place] [address] [place]. [id number]      INSTRUCTIONS IMPORTANT!!!  DO NOT Eat, Drink, or Smoke after 12 midnight unless instructed otherwise by your Surgeon. OK to brush teeth, no gum, candy or mints!    >>>MEDICATION INSTRUCTIONS<<<: Morning of Surgery, take small sip of water with ONLY these medications:  Xanax if needed  Carvedilol  Nexium  Zoloft       *Blood Thinners: Stop taking Plavix & aspirin 5-7 days prior to surgery per Physician Instructions! Call your Surgeon office to inquire about any questions regarding your blood thinner medication.      *Diabetic/ Prediabetic Patients: !!!If you take diabetic or weight loss medication, Do NOT take morning of surgery unless instructed by Doctor!!!  Metformin to be stopped 24 hrs prior to surgery.   Long Acting Insulin Instructions: HOLD the night before surgery unless instructed differently by Provider!  Ozempic/ Mounjaro/ Wegovy/ Trulicity/ Semaglutide injections or weight loss medication to be stopped 7 days prior to surgery.    !!!STOP ALL Aspirins, NSAIDS, WEIGHT LOSS INJECTIONS/PILLS, Herbal supplements, & Vitamins 7 DAYS BEFORE SURGERY!!!    ____  Avoid Alcoholic beverages 3 days prior to surgery, as it can thin the blood.  ____  NO Acrylic/fake nails or nail polish worn day of surgery (specifically hand/arm & foot surgeries).  ____  NO powder, lotions, deodorants, oils or cream on body.  ____  Remove all jewelry & piercings & foreign objects before arrival & leave at home.  ____  Remove Dentures, Hearing Aids & Contact Lens prior to surgery.  ____  Bring photo ID and insurance information to    BALANCE/PROPRIOCEPTION:  needs UE support to be mobile.  Able to stand quietly at the table.    WEIGHTBEARING ALIGNMENT:   NON-WEIGHTBEARING ALIGNMENT:    ROM: hips 120 flex, 10 deg IR.   PELVIC/SI SCREEN:   STRENGTH:  hip abd 3+/5 bilat, ppt fair, hip ext 3/5 bilat    MYOTOMES:    Left Right   T12-L3 (Hip Flexion) 4 4   L2-4 (Quads)  4 3+   L4 (Ankle DF) 4+ 4-   L5 (Great Toe Ext) 4+ 4+   S1 (Toe Raise) 3 3     DTR S:   CORD SIGNS:   DERMATOMES: diminished dorsum of L foot below ankle  NEURAL TENSION:  L HS tension at 50   FLEXIBILITY:  HS R 80,L 50, piri mod tight. Gastroc 15 deg bilat  LUMBAR/HIP Special Tests:    PELVIS/SI SPECIAL TESTS:   FUNCTIONAL TESTS:  sit to stand, needs bilat UE A  PALPATION:   SPINAL SEGMENTAL CONCLUSIONS:       Assessment & Plan   CLINICAL IMPRESSIONS  Medical Diagnosis: chronic LBP with sciatica    Treatment Diagnosis: LBP   Impression/Assessment: Patient is a 63 year old female with weakness, endurance and falls complaints.  The following significant findings have been identified: Pain, Decreased ROM/flexibility, Decreased joint mobility, Decreased strength, Impaired balance, Decreased proprioception, Impaired sensation, Impaired gait, Impaired muscle performance, Decreased activity tolerance, Impaired posture, and Instability. These impairments interfere with their ability to perform self care tasks, recreational activities, household chores, driving , household mobility, and community mobility as compared to previous level of function.     Clinical Decision Making (Complexity):  Clinical Presentation: Unstable/Unpredictable   Clinical Presentation Rationale: based on medical and personal factors listed in PT evaluation  Clinical Decision Making (Complexity): High complexity    PLAN OF CARE  Treatment Interventions:  Interventions: Gait Training, Neuromuscular Re-education, Therapeutic Activity, Therapeutic Exercise, balance for falls    Long Term Goals     PT Goal 1  Goal  hospital (Leave Valuables at Home).  ____  If going home the same day, arrange for a ride home. You will not be able to drive for 24 hrs if Anesthesia was used.   ____  Females (ages 11-60): may need to give a urine sample the morning of surgery; please see Pre op Nurse prior to using the restroom.  ____  Males: Stop ED medications (Viagra, Cialis) 24 hrs prior to surgery.  ____  Wear clean, loose fitting clothing to allow for dressings/ bandages.      Bathing Instructions:    -Shower with anti-bacterial Soap (Hibiclens or Dial) the night before surgery and the morning of.   -Do not use Hibiclens on your face or genitals.   -Apply clean clothes after shower.  -Do not shave your face or body 2 days prior to surgery unless instructed otherwise by your Surgeon.  -Do not shave pubic hair 7 days prior to surgery (gyn pt's).    [facility] Vis[facility]/Ride Policy:  Only 2 adults allowed in pre op/recovery area during your procedure. You MUST HAVE A RIDE HOME from a responsible adult that you know and trust. Medical Transport, Uber or Lyft can ONLY be used if patient has a responsible adult to accompany them during ride home.       *Signs and symptoms of Infection Before or After Surgery:               !!!If you experience any fever, chills, nausea/ vomiting, foul odor/ excessive drainage from surgical site, flu-like symptoms, new wounds or cuts, PLEASE CALL THE SURGEON OFFICE at [phone] or SEND MESSAGE THROUGH [employer] PORTAL!!!     *If you are running late the morning of surgery, please call the Hospital Surgery Dept @ [phone].     *Billing questions:  [phone]     Thank you,  -[facility] Pre Admit Dept.  [phone] or [phone]  M-F 7:30 am-4:00 pm (Closed Major Holidays)     Identifier: 1  Goal Description: pt will be able to stand 5 minutes without AD  Rationale: to maximize safety and independence with performance of ADLs and functional tasks  Target Date: 01/08/24  PT Goal 2  Goal Identifier: 2  Goal Description: pt will be able to ascend one flight reciprocally wiht one rail  Rationale: to maximize safety and independence with performance of ADLs and functional tasks  Target Date: 01/22/24  PT Goal 3  Goal Identifier: 3  Goal Description: pt will sit to stand without UE A  Rationale: to maximize safety and independence with performance of ADLs and functional tasks  Target Date: 01/08/24      Frequency of Treatment: 1x/wk  Duration of Treatment: 8wk    Recommended Referrals to Other Professionals:   Education Assessment:        Risks and benefits of evaluation/treatment have been explained.   Patient/Family/caregiver agrees with Plan of Care.     Evaluation Time:             Signing Clinician: Ivan Guthrie, PT

## 2024-08-30 DIAGNOSIS — F41.1 GAD (GENERALIZED ANXIETY DISORDER): ICD-10-CM

## 2024-08-30 RX ORDER — CLONAZEPAM 0.5 MG/1
TABLET ORAL
Qty: 45 TABLET | Refills: 1 | Status: SHIPPED | OUTPATIENT
Start: 2024-08-30

## 2024-09-27 DIAGNOSIS — F41.1 GAD (GENERALIZED ANXIETY DISORDER): ICD-10-CM

## 2024-10-01 RX ORDER — AMITRIPTYLINE HYDROCHLORIDE 100 MG/1
100 TABLET ORAL AT BEDTIME
Qty: 90 TABLET | Refills: 0 | Status: SHIPPED | OUTPATIENT
Start: 2024-10-01

## 2024-10-08 DIAGNOSIS — J44.9 CHRONIC OBSTRUCTIVE PULMONARY DISEASE, UNSPECIFIED COPD TYPE (H): ICD-10-CM

## 2024-10-09 RX ORDER — UMECLIDINIUM BROMIDE AND VILANTEROL TRIFENATATE 62.5; 25 UG/1; UG/1
1 POWDER RESPIRATORY (INHALATION) DAILY
OUTPATIENT
Start: 2024-10-09

## 2024-10-30 ENCOUNTER — TELEPHONE (OUTPATIENT)
Dept: FAMILY MEDICINE | Facility: CLINIC | Age: 64
End: 2024-10-30
Payer: COMMERCIAL

## 2024-10-30 NOTE — TELEPHONE ENCOUNTER
Patient Quality Outreach    Patient is due for the following:   Colon Cancer Screening  Breast Cancer Screening - Mammogram  Physical Annual Wellness Visit    Next Steps:   Patient has upcoming appointment, these items will be addressed at that time.  Patient has upcoming appointment on 11/20/2024.    Type of outreach:    Chart review performed, no outreach needed.      Questions for provider review:    None           Beverley Horowitz

## 2024-12-30 DIAGNOSIS — F41.1 GAD (GENERALIZED ANXIETY DISORDER): ICD-10-CM

## 2024-12-31 RX ORDER — CLONAZEPAM 0.5 MG/1
TABLET ORAL
Qty: 45 TABLET | Refills: 0 | Status: SHIPPED | OUTPATIENT
Start: 2024-12-31

## 2025-01-04 ENCOUNTER — MYC REFILL (OUTPATIENT)
Dept: FAMILY MEDICINE | Facility: CLINIC | Age: 65
End: 2025-01-04
Payer: COMMERCIAL

## 2025-01-04 DIAGNOSIS — J44.9 CHRONIC OBSTRUCTIVE PULMONARY DISEASE, UNSPECIFIED COPD TYPE (H): ICD-10-CM

## 2025-01-08 ENCOUNTER — TELEPHONE (OUTPATIENT)
Dept: FAMILY MEDICINE | Facility: CLINIC | Age: 65
End: 2025-01-08
Payer: COMMERCIAL

## 2025-01-08 NOTE — TELEPHONE ENCOUNTER
Patient Quality Outreach    Patient is due for the following:   Colon Cancer Screening  Breast Cancer Screening - Mammogram  Physical Annual Wellness Visit    Action(s) Taken:   Patient has upcoming appointment, these items will be addressed at that time.  Patient has upcoming appointment 1/15/2025.    Type of outreach:    Chart review performed, no outreach needed.    Questions for provider review:    None           Beverley Horowitz

## 2025-01-17 DIAGNOSIS — F41.1 GAD (GENERALIZED ANXIETY DISORDER): ICD-10-CM

## 2025-01-20 RX ORDER — AMITRIPTYLINE HYDROCHLORIDE 100 MG/1
100 TABLET ORAL AT BEDTIME
Qty: 90 TABLET | Refills: 0 | Status: SHIPPED | OUTPATIENT
Start: 2025-01-20

## 2025-02-03 DIAGNOSIS — J44.9 CHRONIC OBSTRUCTIVE PULMONARY DISEASE, UNSPECIFIED COPD TYPE (H): ICD-10-CM

## 2025-02-15 DIAGNOSIS — F41.1 GAD (GENERALIZED ANXIETY DISORDER): ICD-10-CM

## 2025-02-17 RX ORDER — CLONAZEPAM 0.5 MG/1
TABLET ORAL
Qty: 45 TABLET | Refills: 0 | Status: SHIPPED | OUTPATIENT
Start: 2025-02-17

## 2025-03-05 ENCOUNTER — VIRTUAL VISIT (OUTPATIENT)
Dept: FAMILY MEDICINE | Facility: CLINIC | Age: 65
End: 2025-03-05
Payer: COMMERCIAL

## 2025-03-05 DIAGNOSIS — M32.9 SYSTEMIC LUPUS ERYTHEMATOSUS, UNSPECIFIED SLE TYPE, UNSPECIFIED ORGAN INVOLVEMENT STATUS (H): ICD-10-CM

## 2025-03-05 DIAGNOSIS — R91.8 PULMONARY NODULES: ICD-10-CM

## 2025-03-05 DIAGNOSIS — Z79.899 MEDICATION MANAGEMENT: ICD-10-CM

## 2025-03-05 DIAGNOSIS — F33.41 RECURRENT MAJOR DEPRESSIVE DISORDER, IN PARTIAL REMISSION: ICD-10-CM

## 2025-03-05 DIAGNOSIS — F41.1 GAD (GENERALIZED ANXIETY DISORDER): Primary | ICD-10-CM

## 2025-03-05 DIAGNOSIS — J44.9 CHRONIC OBSTRUCTIVE PULMONARY DISEASE, UNSPECIFIED COPD TYPE (H): ICD-10-CM

## 2025-03-05 DIAGNOSIS — Z13.220 SCREENING FOR LIPID DISORDERS: ICD-10-CM

## 2025-03-05 DIAGNOSIS — M45.9 ANKYLOSING SPONDYLITIS, UNSPECIFIED SITE OF SPINE (H): ICD-10-CM

## 2025-03-05 DIAGNOSIS — F41.1 GAD (GENERALIZED ANXIETY DISORDER): ICD-10-CM

## 2025-03-05 DIAGNOSIS — F43.23 ADJUSTMENT REACTION WITH ANXIETY AND DEPRESSION: ICD-10-CM

## 2025-03-05 DIAGNOSIS — R73.03 PREDIABETES: ICD-10-CM

## 2025-03-05 RX ORDER — CLONAZEPAM 0.5 MG/1
TABLET ORAL
Qty: 45 TABLET | Refills: 0 | OUTPATIENT
Start: 2025-03-05

## 2025-03-05 RX ORDER — CLONAZEPAM 0.5 MG/1
TABLET ORAL
Qty: 45 TABLET | Refills: 5 | Status: SHIPPED | OUTPATIENT
Start: 2025-03-05

## 2025-03-05 RX ORDER — PREDNISONE 1 MG/1
1 TABLET ORAL DAILY
Qty: 90 TABLET | Refills: 0 | Status: SHIPPED | OUTPATIENT
Start: 2025-03-05

## 2025-03-05 RX ORDER — ALBUTEROL SULFATE 90 UG/1
2 INHALANT RESPIRATORY (INHALATION) EVERY 6 HOURS PRN
Qty: 8.5 G | Refills: 1 | Status: SHIPPED | OUTPATIENT
Start: 2025-03-05

## 2025-03-05 RX ORDER — AZATHIOPRINE 50 MG/1
50 TABLET ORAL 3 TIMES DAILY
Qty: 90 TABLET | Refills: 5 | Status: SHIPPED | OUTPATIENT
Start: 2025-03-05

## 2025-03-05 RX ORDER — HYDROXYCHLOROQUINE SULFATE 200 MG/1
600 TABLET, FILM COATED ORAL DAILY
Qty: 90 TABLET | Refills: 1 | Status: SHIPPED | OUTPATIENT
Start: 2025-03-05

## 2025-03-05 RX ORDER — AMITRIPTYLINE HYDROCHLORIDE 100 MG/1
100 TABLET ORAL AT BEDTIME
Qty: 90 TABLET | Refills: 1 | Status: SHIPPED | OUTPATIENT
Start: 2025-03-05

## 2025-03-05 RX ORDER — SERTRALINE HYDROCHLORIDE 100 MG/1
200 TABLET, FILM COATED ORAL DAILY
Qty: 180 TABLET | Refills: 3 | Status: SHIPPED | OUTPATIENT
Start: 2025-03-05

## 2025-03-05 ASSESSMENT — ANXIETY QUESTIONNAIRES
IF YOU CHECKED OFF ANY PROBLEMS ON THIS QUESTIONNAIRE, HOW DIFFICULT HAVE THESE PROBLEMS MADE IT FOR YOU TO DO YOUR WORK, TAKE CARE OF THINGS AT HOME, OR GET ALONG WITH OTHER PEOPLE: NOT DIFFICULT AT ALL
5. BEING SO RESTLESS THAT IT IS HARD TO SIT STILL: NOT AT ALL
GAD7 TOTAL SCORE: 0
GAD7 TOTAL SCORE: 0
1. FEELING NERVOUS, ANXIOUS, OR ON EDGE: NOT AT ALL
2. NOT BEING ABLE TO STOP OR CONTROL WORRYING: NOT AT ALL
7. FEELING AFRAID AS IF SOMETHING AWFUL MIGHT HAPPEN: NOT AT ALL
6. BECOMING EASILY ANNOYED OR IRRITABLE: NOT AT ALL
3. WORRYING TOO MUCH ABOUT DIFFERENT THINGS: NOT AT ALL

## 2025-03-05 ASSESSMENT — PATIENT HEALTH QUESTIONNAIRE - PHQ9
5. POOR APPETITE OR OVEREATING: NOT AT ALL
SUM OF ALL RESPONSES TO PHQ QUESTIONS 1-9: 1

## 2025-03-05 NOTE — PROGRESS NOTES
Cris is a 64 year old who is being evaluated via a billable video visit.    How would you like to obtain your AVS? MyChart  If the video visit is dropped, the invitation should be resent by: Text to cell phone: [phone]  Will anyone else be joining your video visit? No      Assessment & Plan     EGUENIE (generalized anxiety disorder)  Stable, refill  - amitriptyline (ELAVIL) 100 MG tablet; Take 1 tablet (100 mg) by mouth at bedtime.  - clonazePAM (KLONOPIN) 0.5 MG tablet; TAKE ONE-HALF TABLET BY MOUTH THREE TIMES A DAY AS NEEDED FOR ANXIETY    Pulmonary nodules  Due for recheck  - CT Chest w/o Contrast; Future    Chronic obstructive pulmonary disease, unspecified COPD type (H)  Stable, refill  - albuterol (PROAIR HFA/PROVENTIL HFA/VENTOLIN HFA) 108 (90 Base) MCG/ACT inhaler; Inhale 2 puffs into the lungs every 6 hours as needed for shortness of breath or wheezing.    Ankylosing spondylitis, unspecified site of spine (H)  On prednisone for lupus following with pain clinic.      Adjustment reaction with anxiety and depression  Due to pain and inability to do much    Systemic lupus erythematosus, unspecified SLE type, unspecified organ involvement status (H)  Fair control, does have flares few times a month still with joint pain and swelling and facial rash.  Due for eye exam, will get this done in the next 2 months prior to seeing me again 4/23/25 and then I'll send ongoing plaquenil prescription.  - predniSONE (DELTASONE) 1 MG tablet; Take 1 tablet (1 mg) by mouth daily.  - hydroxychloroquine (PLAQUENIL) 200 MG tablet; Take 3 tablets (600 mg) by mouth daily. Schedule eye exam.  - azaTHIOprine (IMURAN) 50 MG tablet; Take 1 tablet (50 mg) by mouth 3 times daily.    Prediabetes  Recheck, at risk being on prednisone and with less mobility due to back pain.  - Hemoglobin A1c; Future    Medication management  - Hemoglobin A1c; Future  - Basic metabolic panel  (Ca, Cl, CO2, Creat, Gluc, K, Na, BUN); Future    Screening for  "lipid disorders  - Lipid panel reflex to direct LDL Non-fasting; Future    Recurrent major depressive disorder, in partial remission  Stable, refill  - sertraline (ZOLOFT) 100 MG tablet; Take 2 tablets (200 mg) by mouth daily.    The longitudinal plan of care for the diagnosis(es)/condition(s) as documented were addressed during this visit. Due to the added complexity in care, I will continue to support Cris in the subsequent management and with ongoing continuity of care.      BMI  Estimated body mass index is 34.88 kg/m  as calculated from the following:    Height as of 4/3/24: 1.638 m (5' 4.5\").    Weight as of 4/3/24: 93.6 kg (206 lb 6.4 oz).         See Patient Instructions    Subjective   Cris is a 64 year old, presenting for the following health issues:  Depression, Anxiety, and Back Pain        3/5/2025     3:30 PM   Additional Questions   Roomed by Kate     History of Present Illness       Reason for visit:  Meds   She is taking medications regularly.        Depression and Anxiety   How are you doing with your depression since your last visit? Improved   How are you doing with your anxiety since your last visit?  Improved   Are you having other symptoms that might be associated with depression or anxiety? No  Have you had a significant life event? No   Do you have any concerns with your use of alcohol or other drugs? No    Social History     Tobacco Use    Smoking status: Former     Current packs/day: 0.00     Average packs/day: 0.5 packs/day for 10.0 years (5.0 ttl pk-yrs)     Types: Cigarettes, Other     Start date: 10/19/2012     Quit date: 10/19/2022     Years since quittin.3    Smokeless tobacco: Never    Tobacco comments:     Use Electronic Cigarette   Vaping Use    Vaping status: Some Days    Substances: Nicotine   Substance Use Topics    Alcohol use: No     Alcohol/week: 0.0 standard drinks of alcohol    Drug use: No         10/17/2023     1:01 PM 4/3/2024     4:22 PM 3/5/2025     3:36 PM "   PHQ   PHQ-9 Total Score 1 1 1   Q9: Thoughts of better off dead/self-harm past 2 weeks Not at all Not at all  Not at all       Proxy-reported         10/17/2023     1:01 PM 4/3/2024     4:22 PM 3/5/2025     3:36 PM   EUGENIE-7 SCORE   Total Score  1 (minimal anxiety)    Total Score 1 1 0         3/5/2025     3:36 PM   Last PHQ-9   1.  Little interest or pleasure in doing things 0   2.  Feeling down, depressed, or hopeless 1   3.  Trouble falling or staying asleep, or sleeping too much 0   4.  Feeling tired or having little energy 0   5.  Poor appetite or overeating 0   6.  Feeling bad about yourself 0   7.  Trouble concentrating 0   8.  Moving slowly or restless 0   Q9: Thoughts of better off dead/self-harm past 2 weeks 0   PHQ-9 Total Score 1   Difficulty at work, home, or with people Not difficult at all         3/5/2025     3:36 PM   EUGENIE-7    1. Feeling nervous, anxious, or on edge 0   2. Not being able to stop or control worrying 0   3. Worrying too much about different things 0   4. Trouble relaxing 0   5. Being so restless that it is hard to sit still 0   6. Becoming easily annoyed or irritable 0   7. Feeling afraid, as if something awful might happen 0   EUGENIE-7 Total Score 0   If you checked any problems, how difficult have they made it for you to do your work, take care of things at home, or get along with other people? Not difficult at all       Suicide Assessment Five-step Evaluation and Treatment (SAFE-T)    Lupus: hasn't had a rheumatology specialist for a time.  Has flare ups 3 times a month, 3-5 days feels tired, itchy, red face and rash on face, pain in joints and swelling in fingers, elbows and knees.  Taking azathoioprine 50mg TID, prednisone 1mg daily and plaquenil 600mg daily.    Pain History:  When did you first notice your pain? 5 years   Have you seen this provider for your pain in the past? Yes   Where in your body do you have pain? Lower back  Are you seeing anyone else for your pain? Yes -  Neurologist and Pain Doctor  Going to have the pain stimulator placed.        10/17/2023     1:01 PM 4/3/2024     4:22 PM 3/5/2025     3:36 PM   PHQ-9 SCORE   PHQ-9 Total Score MyChart  1 (Minimal depression)    PHQ-9 Total Score 1 1 1           10/17/2023     1:01 PM 4/3/2024     4:22 PM 3/5/2025     3:36 PM   UEGENIE-7 SCORE   Total Score  1 (minimal anxiety)    Total Score 1 1 0           10/17/2023     1:01 PM 4/3/2024     4:32 PM 3/5/2025     3:36 PM   PEG Score   PEG Total Score 7 4.67 8.33       Chronic Pain Follow Up:    Location of pain: back  Analgesia/pain control:    - Recent changes:  going to do the stimulator    - Overall control: Inadequate pain control many days   - Current treatments: MS contin from pain clinic   Flexeril 1-2 times a month.  Adherence:     - Do you ever take more pain medicine than prescribed? No    - When did you take your last dose of pain medicine?  This morning   Adverse effects: No   PDMP Review         Value Time User    State PDMP site checked  Yes 2/17/2025  1:34 PM Luca Glasgow MD          Last CSA Agreement:   CSA -- Patient Level:    CSA: None found at the patient level.       Last UDS: 3/1/2014      COPD Follow-Up  Overall, how are your COPD symptoms since your last clinic visit?  Better with the anoro ellipta  How much fatigue or shortness of breath do you have when you are walking?  Hardly any, better than before the anoro ellipta  How much shortness of breath do you have when you are resting?  None  How often do you cough? Rarely  Have you noticed any change in your sputum/phlegm?  No  Have you experienced a recent fever? No  Please describe how far you can walk without stopping to rest:  only a few rooms due to the back pain, not the shortness of breath.  How many flights of stairs are you able to walk up without stopping?  None  Have you had any Emergency Room Visits, Urgent Care Visits, or Hospital Admissions because of your COPD since your last office visit?   "No    History   Smoking Status    Former    Types: Cigarettes, Other   Smokeless Tobacco    Never     No results found for: \"FEV1\", \"ZXA2MDI\"      Review of Systems  Constitutional, HEENT, cardiovascular, pulmonary, gi and gu systems are negative, except as otherwise noted.      Objective    Vitals - Patient Reported  Systolic (Patient Reported): 130  Diastolic (Patient Reported): 84  Weight (Patient Reported): 99.8 kg (220 lb)  Height (Patient Reported): 170.2 cm (5' 7\")  BMI (Based on Pt Reported Ht/Wt): 34.46  Pulse (Patient Reported): 72  Pain Score: Severe Pain (9)  Pain Loc: Low Back      Vitals:  No vitals were obtained today due to virtual visit.    Physical Exam   GENERAL: alert and no distress  EYES: Eyes grossly normal to inspection.  No discharge or erythema, or obvious scleral/conjunctival abnormalities.  RESP: No audible wheeze, cough, or visible cyanosis.    SKIN: Visible skin clear. No significant rash, abnormal pigmentation or lesions.  NEURO: Cranial nerves grossly intact.  Mentation and speech appropriate for age.  PSYCH: Appropriate affect, tone, and pace of words        Video-Visit Details    Type of service:  Video Visit   Originating Location (pt. Location): Home    Distant Location (provider location):  On-site  Platform used for Video Visit: Miller  Signed Electronically by: Luca Glasgow MD    "

## 2025-04-16 ENCOUNTER — TELEPHONE (OUTPATIENT)
Dept: FAMILY MEDICINE | Facility: CLINIC | Age: 65
End: 2025-04-16
Payer: COMMERCIAL

## 2025-04-16 NOTE — TELEPHONE ENCOUNTER
Patient Quality Outreach    Patient is due for the following:   Breast Cancer Screening - Mammogram  Cervical Cancer Screening - PAP Needed  Physical Annual Wellness Visit    Action(s) Taken:   Patient has upcoming appointment, these items will be addressed at that time.  Appt on 8/27/25.    Type of outreach:    Chart review performed, no outreach needed.    Questions for provider review:    None         Beverley Horowitz  Chart routed to None.

## 2025-05-14 ENCOUNTER — TELEPHONE (OUTPATIENT)
Dept: FAMILY MEDICINE | Facility: CLINIC | Age: 65
End: 2025-05-14
Payer: COMMERCIAL

## 2025-05-14 ENCOUNTER — MYC MEDICAL ADVICE (OUTPATIENT)
Dept: FAMILY MEDICINE | Facility: CLINIC | Age: 65
End: 2025-05-14
Payer: COMMERCIAL

## 2025-05-14 NOTE — TELEPHONE ENCOUNTER
Placed in provider of the days box, Dr. Box.         Mary,    I have printed and prepped form. Placed in providers box to review and sign. We will let you know once it is completed.    Thank you,    Clinic Care Team     ===View-only below this line===      ----- Message -----       From:Cris Priest       Sent:5/14/2025  3:08 AM CDT         To:Customer Service    Subject:Need help.    Topic: Non-Medical Question.    I need my new FMLA  I know Dr. Glasgow is out. Please could someone from my care team to look at last FMLA form a day copy with the Date of 05/15/2025.  Thank you  Cris Priest  If needed call me. Sandra or Dominique have worked on my FMLA for years  1 attachment    Dominique Kern on 5/14/2025 at 1:17 PM

## 2025-05-14 NOTE — TELEPHONE ENCOUNTER
FMLA attachment printed and routed to [employer] for consideration due to PCP absence    Rody Gerard on 5/14/2025 at 9:28 AM

## 2025-05-15 NOTE — TELEPHONE ENCOUNTER
Emailed completed [place] paperwork to email address provided by patient.  Copy of form in cabinet and to scanning.    Corine Escoto on 5/15/2025 at 1:00 PM

## 2025-05-21 ENCOUNTER — HOSPITAL ENCOUNTER (OUTPATIENT)
Dept: MRI IMAGING | Facility: CLINIC | Age: 65
Discharge: HOME | End: 2025-05-21
Payer: COMMERCIAL

## 2025-05-21 DIAGNOSIS — M54.6 PAIN IN THORACIC SPINE: ICD-10-CM

## 2025-05-21 PROCEDURE — 72146 MRI CHEST SPINE W/O DYE: CPT

## 2025-06-21 ENCOUNTER — HEALTH MAINTENANCE LETTER (OUTPATIENT)
Age: 65
End: 2025-06-21

## 2025-07-23 DIAGNOSIS — J44.9 CHRONIC OBSTRUCTIVE PULMONARY DISEASE, UNSPECIFIED COPD TYPE (H): ICD-10-CM

## 2025-07-23 RX ORDER — UMECLIDINIUM BROMIDE AND VILANTEROL TRIFENATATE 62.5; 25 UG/1; UG/1
POWDER RESPIRATORY (INHALATION)
Qty: 180 EACH | Refills: 0 | Status: SHIPPED | OUTPATIENT
Start: 2025-07-23

## 2025-07-23 NOTE — TELEPHONE ENCOUNTER
Has appointment scheduled for 8/27/25.      Requested Prescriptions   Pending Prescriptions Disp Refills    ANORO ELLIPTA 62.5-25 MCG/ACT oral inhaler [Pharmacy Med Name: ANORO ELLIPTA 62.5-25 ORAL INH(30S)]       Sig: INHALE 1 PUFF BY MOUTH INTO THE LUNGS DAILY       Long-Acting [employer]carinic Agonists (LAMA) (including combination products) Failed - 7/23/2025 10:42 AM        Failed - Medication is active on med list and the sig matches. RN to manually verify dose and sig if red X/fail.     If the protocol passes (green check), you do not need to verify med dose and sig.    A prescription matches if they are the same clinical intention.    For Example: once daily and every morning are the same.    The protocol can not identify upper and lower case letters as matching and will fail.     For Example: Take 1 tablet (50 mg) by mouth daily     TAKE 1 TABLET (50 MG) BY MOUTH DAILY    For all fails (red x), verify dose and sig.    If the refill does match what is on file, the RN can still proceed to approve the refill request.       If they do not match, route to the appropriate provider.             Passed - Patient is 5 years of age or older        Passed - Recent (6 month) or future (90 days) visit with the authorizing provider's specialty (provided they have been seen in the past 9 months)     The patient must have completed an in-person or virtual visit within the past 6 months or has a future visit scheduled within the next 90 days with the authorizing provider s specialty.  Urgent care and e-visits do not quality as an office visit for this protocol.          Passed - Medication indicated for associated diagnosis     Medication is associated with one or more of the following diagnoses:     Asthma   COPD         Inhaled Steroids Protocol Failed - 7/23/2025 10:42 AM        Failed - Medication is active on med list and the sig matches. RN to manually verify dose and sig if red X/fail.     If the protocol passes (green  check), you do not need to verify med dose and sig.    A prescription matches if they are the same clinical intention.    For Example: once daily and every morning are the same.    The protocol can not identify upper and lower case letters as matching and will fail.     For Example: Take 1 tablet (50 mg) by mouth daily     TAKE 1 TABLET (50 MG) BY MOUTH DAILY    For all fails (red x), verify dose and sig.    If the refill does match what is on file, the RN can still proceed to approve the refill request.       If they do not match, route to the appropriate provider.             Passed - Patient is age 12 or older        Passed - Recent (12 month) or future (90 days) visit with authorizing provider's specialty (provided they have been seen in the past 15 months)     The patient must have completed an in-person or virtual visit within the past 12 months or has a future visit scheduled within the next 90 days with the authorizing provider s specialty.  Urgent care and e-visits do not qualify as an office visit for this protocol.          Passed - Medication indicated for associated diagnosis     Medication is associated with one or more of the following diagnoses:    Allergies   Asthma   COPD   Nasal Congestion   Nasal Polyps   Rhinitis   Cystic Fibrosis   Bronchiectasis

## 2025-08-06 DIAGNOSIS — J44.9 CHRONIC OBSTRUCTIVE PULMONARY DISEASE, UNSPECIFIED COPD TYPE (H): ICD-10-CM

## 2025-08-07 RX ORDER — ALBUTEROL SULFATE 90 UG/1
2 INHALANT RESPIRATORY (INHALATION) EVERY 6 HOURS PRN
Qty: 8.5 G | Refills: 1 | Status: SHIPPED | OUTPATIENT
Start: 2025-08-07